# Patient Record
Sex: FEMALE | Race: BLACK OR AFRICAN AMERICAN | NOT HISPANIC OR LATINO | Employment: OTHER | RURAL
[De-identification: names, ages, dates, MRNs, and addresses within clinical notes are randomized per-mention and may not be internally consistent; named-entity substitution may affect disease eponyms.]

---

## 2018-02-22 ENCOUNTER — HISTORICAL (OUTPATIENT)
Dept: ADMINISTRATIVE | Facility: HOSPITAL | Age: 73
End: 2018-02-22

## 2018-02-23 LAB
LAB AP CLINICAL INFORMATION: NORMAL
LAB AP DIAGNOSIS - HISTORICAL: NORMAL
LAB AP GROSS PATHOLOGY - HISTORICAL: NORMAL
LAB AP SPECIMEN SUBMITTED - HISTORICAL: NORMAL

## 2019-09-30 ENCOUNTER — HISTORICAL (OUTPATIENT)
Dept: ADMINISTRATIVE | Facility: HOSPITAL | Age: 74
End: 2019-09-30

## 2019-12-18 ENCOUNTER — HISTORICAL (OUTPATIENT)
Dept: ADMINISTRATIVE | Facility: HOSPITAL | Age: 74
End: 2019-12-18

## 2020-03-11 ENCOUNTER — HISTORICAL (OUTPATIENT)
Dept: ADMINISTRATIVE | Facility: HOSPITAL | Age: 75
End: 2020-03-11

## 2021-04-27 ENCOUNTER — OFFICE VISIT (OUTPATIENT)
Dept: FAMILY MEDICINE | Facility: CLINIC | Age: 76
End: 2021-04-27
Payer: COMMERCIAL

## 2021-04-27 VITALS
HEART RATE: 65 BPM | TEMPERATURE: 98 F | BODY MASS INDEX: 25.55 KG/M2 | WEIGHT: 159 LBS | SYSTOLIC BLOOD PRESSURE: 140 MMHG | HEIGHT: 66 IN | DIASTOLIC BLOOD PRESSURE: 62 MMHG | OXYGEN SATURATION: 96 %

## 2021-04-27 DIAGNOSIS — I10 ESSENTIAL HYPERTENSION: Primary | ICD-10-CM

## 2021-04-27 DIAGNOSIS — F43.22 ADJUSTMENT DISORDER WITH ANXIETY: ICD-10-CM

## 2021-04-27 DIAGNOSIS — K21.9 GASTROESOPHAGEAL REFLUX DISEASE WITHOUT ESOPHAGITIS: ICD-10-CM

## 2021-04-27 PROCEDURE — 99214 OFFICE O/P EST MOD 30 MIN: CPT | Mod: ,,, | Performed by: FAMILY MEDICINE

## 2021-04-27 PROCEDURE — 99214 PR OFFICE/OUTPT VISIT, EST, LEVL IV, 30-39 MIN: ICD-10-PCS | Mod: ,,, | Performed by: FAMILY MEDICINE

## 2021-04-27 RX ORDER — PANTOPRAZOLE SODIUM 40 MG/1
TABLET, DELAYED RELEASE ORAL
COMMUNITY
Start: 2021-02-24 | End: 2021-04-27 | Stop reason: SDUPTHER

## 2021-04-27 RX ORDER — HYDROXYZINE PAMOATE 25 MG/1
CAPSULE ORAL
Qty: 30 CAPSULE | Refills: 2 | Status: SHIPPED | OUTPATIENT
Start: 2021-04-27 | End: 2021-05-30 | Stop reason: ALTCHOICE

## 2021-04-27 RX ORDER — METOPROLOL SUCCINATE 50 MG/1
50 TABLET, EXTENDED RELEASE ORAL
COMMUNITY
Start: 2021-03-08 | End: 2021-10-26

## 2021-04-27 RX ORDER — PANTOPRAZOLE SODIUM 40 MG/1
40 TABLET, DELAYED RELEASE ORAL DAILY
Qty: 90 TABLET | Refills: 0 | Status: SHIPPED | OUTPATIENT
Start: 2021-04-27 | End: 2021-05-30 | Stop reason: ALTCHOICE

## 2021-04-27 RX ORDER — PRAVASTATIN SODIUM 20 MG/1
TABLET ORAL
COMMUNITY
Start: 2021-02-24 | End: 2021-06-08 | Stop reason: SDUPTHER

## 2021-04-27 RX ORDER — SITAGLIPTIN AND METFORMIN HYDROCHLORIDE 1000; 100 MG/1; MG/1
TABLET, FILM COATED, EXTENDED RELEASE ORAL
COMMUNITY
Start: 2021-02-28 | End: 2021-05-17

## 2021-04-27 RX ORDER — ONDANSETRON 4 MG/1
4 TABLET, FILM COATED ORAL
COMMUNITY
End: 2022-06-06 | Stop reason: SDUPTHER

## 2021-04-27 RX ORDER — LISINOPRIL 40 MG/1
40 TABLET ORAL
COMMUNITY
Start: 2021-03-08 | End: 2023-09-20 | Stop reason: SDUPTHER

## 2021-05-10 ENCOUNTER — HOSPITAL ENCOUNTER (OUTPATIENT)
Dept: RADIOLOGY | Facility: HOSPITAL | Age: 76
Discharge: HOME | End: 2021-05-10
Payer: COMMERCIAL

## 2021-05-10 VITALS — BODY MASS INDEX: 25.07 KG/M2 | HEIGHT: 66 IN | WEIGHT: 156 LBS

## 2021-05-10 DIAGNOSIS — Z12.31 VISIT FOR SCREENING MAMMOGRAM: ICD-10-CM

## 2021-05-10 PROCEDURE — 77067 SCR MAMMO BI INCL CAD: CPT | Mod: TC

## 2021-05-10 PROCEDURE — 77067 SCR MAMMO BI INCL CAD: CPT | Mod: 26,,, | Performed by: RADIOLOGY

## 2021-05-10 PROCEDURE — 77067 MAMMO DIGITAL SCREENING BILAT: ICD-10-PCS | Mod: 26,,, | Performed by: RADIOLOGY

## 2021-05-17 ENCOUNTER — TELEPHONE (OUTPATIENT)
Dept: DIABETES SERVICES | Facility: CLINIC | Age: 76
End: 2021-05-17

## 2021-05-30 ENCOUNTER — OFFICE VISIT (OUTPATIENT)
Dept: FAMILY MEDICINE | Facility: CLINIC | Age: 76
End: 2021-05-30
Payer: COMMERCIAL

## 2021-05-30 VITALS
OXYGEN SATURATION: 100 % | BODY MASS INDEX: 25.29 KG/M2 | SYSTOLIC BLOOD PRESSURE: 152 MMHG | HEART RATE: 66 BPM | HEIGHT: 66 IN | WEIGHT: 157.38 LBS | RESPIRATION RATE: 18 BRPM | DIASTOLIC BLOOD PRESSURE: 67 MMHG | TEMPERATURE: 98 F

## 2021-05-30 DIAGNOSIS — R19.7 DIARRHEA, UNSPECIFIED TYPE: Primary | ICD-10-CM

## 2021-05-30 PROBLEM — I50.20 SYSTOLIC HEART FAILURE: Status: ACTIVE | Noted: 2018-10-01

## 2021-05-30 PROBLEM — I20.0 UNSTABLE ANGINA: Status: ACTIVE | Noted: 2020-11-02

## 2021-05-30 PROBLEM — I10 BENIGN ESSENTIAL HYPERTENSION: Status: ACTIVE | Noted: 2018-07-03

## 2021-05-30 PROBLEM — R07.9 CHEST PAIN: Status: ACTIVE | Noted: 2018-10-01

## 2021-05-30 PROBLEM — T83.120A: Status: ACTIVE | Noted: 2019-07-25

## 2021-05-30 PROBLEM — R06.00 DYSPNEA: Status: ACTIVE | Noted: 2020-11-02

## 2021-05-30 PROBLEM — R15.9 FECAL INCONTINENCE: Status: ACTIVE | Noted: 2018-10-03

## 2021-05-30 LAB
ALBUMIN SERPL BCP-MCNC: 3.6 G/DL (ref 3.5–5)
ALBUMIN/GLOB SERPL: 1 {RATIO}
ALP SERPL-CCNC: 53 U/L (ref 55–142)
ALT SERPL W P-5'-P-CCNC: 17 U/L (ref 13–56)
ANION GAP SERPL CALCULATED.3IONS-SCNC: 8 MMOL/L (ref 7–16)
AST SERPL W P-5'-P-CCNC: 13 U/L (ref 15–37)
BASOPHILS # BLD AUTO: 0.02 K/UL (ref 0–0.2)
BASOPHILS NFR BLD AUTO: 0.4 % (ref 0–1)
BILIRUB SERPL-MCNC: 0.2 MG/DL (ref 0–1.2)
BUN SERPL-MCNC: 20 MG/DL (ref 7–18)
BUN/CREAT SERPL: 21 (ref 6–20)
C COLI+JEJ+UPSA DNA STL QL NAA+NON-PROBE: NEGATIVE
C DIFF TOX A+B STL IA-ACNC: POSITIVE
CALCIUM SERPL-MCNC: 9.1 MG/DL (ref 8.5–10.1)
CHLORIDE SERPL-SCNC: 111 MMOL/L (ref 98–107)
CO2 SERPL-SCNC: 29 MMOL/L (ref 21–32)
CREAT SERPL-MCNC: 0.94 MG/DL (ref 0.55–1.02)
DIFFERENTIAL METHOD BLD: ABNORMAL
E COLI SXT1 STL QL IA: NEGATIVE
E COLI SXT2 STL QL IA: NEGATIVE
EOSINOPHIL # BLD AUTO: 0.09 K/UL (ref 0–0.5)
EOSINOPHIL NFR BLD AUTO: 1.6 % (ref 1–4)
ERYTHROCYTE [DISTWIDTH] IN BLOOD BY AUTOMATED COUNT: 13.6 % (ref 11.5–14.5)
GLOBULIN SER-MCNC: 3.5 G/DL (ref 2–4)
GLUCOSE SERPL-MCNC: 90 MG/DL (ref 74–106)
HCT VFR BLD AUTO: 35.2 % (ref 38–47)
HGB BLD-MCNC: 11.1 G/DL (ref 12–16)
IMM GRANULOCYTES # BLD AUTO: 0.01 K/UL (ref 0–0.04)
IMM GRANULOCYTES NFR BLD: 0.2 % (ref 0–0.4)
LYMPHOCYTES # BLD AUTO: 1.6 K/UL (ref 1–4.8)
LYMPHOCYTES NFR BLD AUTO: 29.2 % (ref 27–41)
MCH RBC QN AUTO: 27.4 PG (ref 27–31)
MCHC RBC AUTO-ENTMCNC: 31.5 G/DL (ref 32–36)
MCV RBC AUTO: 86.9 FL (ref 80–96)
MONOCYTES # BLD AUTO: 0.45 K/UL (ref 0–0.8)
MONOCYTES NFR BLD AUTO: 8.2 % (ref 2–6)
MPC BLD CALC-MCNC: 10.7 FL (ref 9.4–12.4)
NEUTROPHILS # BLD AUTO: 3.31 K/UL (ref 1.8–7.7)
NEUTROPHILS NFR BLD AUTO: 60.4 % (ref 53–65)
NOROVIRUS GI+II RNA STL QL NAA+NON-PROBE: NEGATIVE
NRBC # BLD AUTO: 0 X10E3/UL
NRBC, AUTO (.00): 0 %
PLATELET # BLD AUTO: 240 K/UL (ref 150–400)
POTASSIUM SERPL-SCNC: 4.5 MMOL/L (ref 3.5–5.1)
PROT SERPL-MCNC: 7.1 G/DL (ref 6.4–8.2)
RBC # BLD AUTO: 4.05 M/UL (ref 4.2–5.4)
RVA RNA STL QL NAA+NON-PROBE: NEGATIVE
S ENT+BONG DNA STL QL NAA+NON-PROBE: NEGATIVE
SHIGELLA SPECIES NAT: NEGATIVE
SODIUM SERPL-SCNC: 143 MMOL/L (ref 136–145)
V CHOL+PARA+VUL DNA STL QL NAA+NON-PROBE: NEGATIVE
WBC # BLD AUTO: 5.48 K/UL (ref 4.5–11)
Y ENTEROCOL DNA STL QL NAA+NON-PROBE: NEGATIVE

## 2021-05-30 PROCEDURE — 80053 COMPREHENSIVE METABOLIC PANEL: ICD-10-PCS | Mod: QW,,, | Performed by: CLINICAL MEDICAL LABORATORY

## 2021-05-30 PROCEDURE — 99214 OFFICE O/P EST MOD 30 MIN: CPT | Mod: ,,, | Performed by: NURSE PRACTITIONER

## 2021-05-30 PROCEDURE — 87493 C DIFF TOXIN BY PCR: ICD-10-PCS | Mod: 59,,, | Performed by: CLINICAL MEDICAL LABORATORY

## 2021-05-30 PROCEDURE — 80053 COMPREHEN METABOLIC PANEL: CPT | Mod: QW,,, | Performed by: CLINICAL MEDICAL LABORATORY

## 2021-05-30 PROCEDURE — 87506 ENTERIC PATHOGEN PANEL: ICD-10-PCS | Mod: ,,, | Performed by: CLINICAL MEDICAL LABORATORY

## 2021-05-30 PROCEDURE — 99051 MED SERV EVE/WKEND/HOLIDAY: CPT | Mod: ,,, | Performed by: NURSE PRACTITIONER

## 2021-05-30 PROCEDURE — 85025 COMPLETE CBC W/AUTO DIFF WBC: CPT | Mod: QW,,, | Performed by: CLINICAL MEDICAL LABORATORY

## 2021-05-30 PROCEDURE — 87493 C DIFF AMPLIFIED PROBE: CPT | Mod: 59,,, | Performed by: CLINICAL MEDICAL LABORATORY

## 2021-05-30 PROCEDURE — 85025 CBC WITH DIFFERENTIAL: ICD-10-PCS | Mod: QW,,, | Performed by: CLINICAL MEDICAL LABORATORY

## 2021-05-30 PROCEDURE — 99051 PR MEDICAL SERVICES, EVE/WKEND/HOLIDAY: ICD-10-PCS | Mod: ,,, | Performed by: NURSE PRACTITIONER

## 2021-05-30 PROCEDURE — 87506 IADNA-DNA/RNA PROBE TQ 6-11: CPT | Mod: ,,, | Performed by: CLINICAL MEDICAL LABORATORY

## 2021-05-30 PROCEDURE — 99214 PR OFFICE/OUTPT VISIT, EST, LEVL IV, 30-39 MIN: ICD-10-PCS | Mod: ,,, | Performed by: NURSE PRACTITIONER

## 2021-05-30 RX ORDER — METRONIDAZOLE 500 MG/1
500 TABLET ORAL 3 TIMES DAILY
Qty: 30 TABLET | Refills: 0 | Status: SHIPPED | OUTPATIENT
Start: 2021-05-30 | End: 2021-06-01 | Stop reason: ALTCHOICE

## 2021-05-30 RX ORDER — PANTOPRAZOLE SODIUM 40 MG/1
40 TABLET, DELAYED RELEASE ORAL DAILY
COMMUNITY
End: 2021-12-09

## 2021-05-30 RX ORDER — METRONIDAZOLE 500 MG/1
500 TABLET ORAL 3 TIMES DAILY
Qty: 30 TABLET | Refills: 0 | Status: SHIPPED | OUTPATIENT
Start: 2021-05-30 | End: 2021-05-30

## 2021-05-30 RX ORDER — AZITHROMYCIN 500 MG/1
500 TABLET, FILM COATED ORAL DAILY
Qty: 3 TABLET | Refills: 0 | Status: SHIPPED | OUTPATIENT
Start: 2021-05-30 | End: 2021-06-01 | Stop reason: ALTCHOICE

## 2021-06-01 RX ORDER — VANCOMYCIN HYDROCHLORIDE 125 MG/1
125 CAPSULE ORAL 4 TIMES DAILY
Qty: 40 CAPSULE | Refills: 0 | Status: SHIPPED | OUTPATIENT
Start: 2021-06-01 | End: 2021-06-11

## 2021-06-14 ENCOUNTER — OFFICE VISIT (OUTPATIENT)
Dept: FAMILY MEDICINE | Facility: CLINIC | Age: 76
End: 2021-06-14
Payer: COMMERCIAL

## 2021-06-14 VITALS
OXYGEN SATURATION: 99 % | WEIGHT: 156.81 LBS | SYSTOLIC BLOOD PRESSURE: 168 MMHG | RESPIRATION RATE: 16 BRPM | BODY MASS INDEX: 25.2 KG/M2 | TEMPERATURE: 97 F | HEIGHT: 66 IN | DIASTOLIC BLOOD PRESSURE: 81 MMHG | HEART RATE: 60 BPM

## 2021-06-14 DIAGNOSIS — R19.7 DIARRHEA, UNSPECIFIED TYPE: ICD-10-CM

## 2021-06-14 DIAGNOSIS — A04.72 C. DIFFICILE DIARRHEA: Primary | ICD-10-CM

## 2021-06-14 LAB
ALBUMIN SERPL BCP-MCNC: 3.7 G/DL (ref 3.5–5)
ALBUMIN/GLOB SERPL: 1.1 {RATIO}
ALP SERPL-CCNC: 49 U/L (ref 55–142)
ALT SERPL W P-5'-P-CCNC: 22 U/L (ref 13–56)
ANION GAP SERPL CALCULATED.3IONS-SCNC: 10 MMOL/L (ref 7–16)
AST SERPL W P-5'-P-CCNC: 15 U/L (ref 15–37)
BASOPHILS # BLD AUTO: 0.02 K/UL (ref 0–0.2)
BASOPHILS NFR BLD AUTO: 0.4 % (ref 0–1)
BILIRUB SERPL-MCNC: 0.4 MG/DL (ref 0–1.2)
BUN SERPL-MCNC: 14 MG/DL (ref 7–18)
BUN/CREAT SERPL: 18 (ref 6–20)
CALCIUM SERPL-MCNC: 8.8 MG/DL (ref 8.5–10.1)
CHLORIDE SERPL-SCNC: 106 MMOL/L (ref 98–107)
CO2 SERPL-SCNC: 30 MMOL/L (ref 21–32)
CREAT SERPL-MCNC: 0.79 MG/DL (ref 0.55–1.02)
DIFFERENTIAL METHOD BLD: ABNORMAL
EOSINOPHIL # BLD AUTO: 0.08 K/UL (ref 0–0.5)
EOSINOPHIL NFR BLD AUTO: 1.7 % (ref 1–4)
ERYTHROCYTE [DISTWIDTH] IN BLOOD BY AUTOMATED COUNT: 13.9 % (ref 11.5–14.5)
GLOBULIN SER-MCNC: 3.3 G/DL (ref 2–4)
GLUCOSE SERPL-MCNC: 79 MG/DL (ref 74–106)
HCT VFR BLD AUTO: 34.3 % (ref 38–47)
HGB BLD-MCNC: 10.9 G/DL (ref 12–16)
IMM GRANULOCYTES # BLD AUTO: 0.01 K/UL (ref 0–0.04)
IMM GRANULOCYTES NFR BLD: 0.2 % (ref 0–0.4)
LYMPHOCYTES # BLD AUTO: 1.85 K/UL (ref 1–4.8)
LYMPHOCYTES NFR BLD AUTO: 39.9 % (ref 27–41)
MCH RBC QN AUTO: 27.8 PG (ref 27–31)
MCHC RBC AUTO-ENTMCNC: 31.8 G/DL (ref 32–36)
MCV RBC AUTO: 87.5 FL (ref 80–96)
MONOCYTES # BLD AUTO: 0.45 K/UL (ref 0–0.8)
MONOCYTES NFR BLD AUTO: 9.7 % (ref 2–6)
MPC BLD CALC-MCNC: 11 FL (ref 9.4–12.4)
NEUTROPHILS # BLD AUTO: 2.23 K/UL (ref 1.8–7.7)
NEUTROPHILS NFR BLD AUTO: 48.1 % (ref 53–65)
NRBC # BLD AUTO: 0 X10E3/UL
NRBC, AUTO (.00): 0 %
PLATELET # BLD AUTO: 197 K/UL (ref 150–400)
POTASSIUM SERPL-SCNC: 4.6 MMOL/L (ref 3.5–5.1)
PROT SERPL-MCNC: 7 G/DL (ref 6.4–8.2)
RBC # BLD AUTO: 3.92 M/UL (ref 4.2–5.4)
SODIUM SERPL-SCNC: 141 MMOL/L (ref 136–145)
WBC # BLD AUTO: 4.64 K/UL (ref 4.5–11)

## 2021-06-14 PROCEDURE — 99214 PR OFFICE/OUTPT VISIT, EST, LEVL IV, 30-39 MIN: ICD-10-PCS | Mod: ,,, | Performed by: NURSE PRACTITIONER

## 2021-06-14 PROCEDURE — 80053 COMPREHENSIVE METABOLIC PANEL: ICD-10-PCS | Mod: ,,, | Performed by: CLINICAL MEDICAL LABORATORY

## 2021-06-14 PROCEDURE — 99214 OFFICE O/P EST MOD 30 MIN: CPT | Mod: ,,, | Performed by: NURSE PRACTITIONER

## 2021-06-14 PROCEDURE — 80053 COMPREHEN METABOLIC PANEL: CPT | Mod: ,,, | Performed by: CLINICAL MEDICAL LABORATORY

## 2021-06-14 PROCEDURE — 85025 CBC WITH DIFFERENTIAL: ICD-10-PCS | Mod: ,,, | Performed by: CLINICAL MEDICAL LABORATORY

## 2021-06-14 PROCEDURE — 85025 COMPLETE CBC W/AUTO DIFF WBC: CPT | Mod: ,,, | Performed by: CLINICAL MEDICAL LABORATORY

## 2021-06-14 RX ORDER — VANCOMYCIN HYDROCHLORIDE 125 MG/1
125 CAPSULE ORAL 4 TIMES DAILY
Qty: 40 CAPSULE | Refills: 0 | Status: SHIPPED | OUTPATIENT
Start: 2021-06-14 | End: 2021-07-02

## 2021-06-15 RX ORDER — ASPIRIN 81 MG/1
81 TABLET ORAL DAILY
COMMUNITY

## 2021-06-15 RX ORDER — CETIRIZINE HYDROCHLORIDE 10 MG/1
10 TABLET ORAL DAILY
COMMUNITY
End: 2022-01-26

## 2021-06-15 RX ORDER — FLUTICASONE PROPIONATE 50 MCG
1 SPRAY, SUSPENSION (ML) NASAL DAILY
COMMUNITY
End: 2022-01-26

## 2021-06-15 RX ORDER — BLOOD-GLUCOSE CONTROL, NORMAL
EACH MISCELLANEOUS
COMMUNITY
End: 2022-01-04 | Stop reason: SDUPTHER

## 2021-06-16 ENCOUNTER — OFFICE VISIT (OUTPATIENT)
Dept: DIABETES SERVICES | Facility: CLINIC | Age: 76
End: 2021-06-16
Payer: COMMERCIAL

## 2021-06-16 VITALS
HEIGHT: 66 IN | RESPIRATION RATE: 16 BRPM | SYSTOLIC BLOOD PRESSURE: 122 MMHG | DIASTOLIC BLOOD PRESSURE: 58 MMHG | WEIGHT: 158.19 LBS | OXYGEN SATURATION: 96 % | BODY MASS INDEX: 25.42 KG/M2 | HEART RATE: 66 BPM

## 2021-06-16 DIAGNOSIS — I50.20 SYSTOLIC HEART FAILURE, UNSPECIFIED HF CHRONICITY: ICD-10-CM

## 2021-06-16 DIAGNOSIS — E11.9 TYPE 2 DIABETES MELLITUS WITHOUT COMPLICATION, WITHOUT LONG-TERM CURRENT USE OF INSULIN: Primary | ICD-10-CM

## 2021-06-16 DIAGNOSIS — I10 BENIGN ESSENTIAL HYPERTENSION: ICD-10-CM

## 2021-06-16 DIAGNOSIS — R15.9 INCONTINENCE OF FECES, UNSPECIFIED FECAL INCONTINENCE TYPE: ICD-10-CM

## 2021-06-16 LAB
GLUCOSE SERPL-MCNC: 137 MG/DL (ref 70–110)
HBA1C MFR BLD: 6.6 % (ref 4.5–6.6)

## 2021-06-16 PROCEDURE — 99214 PR OFFICE/OUTPT VISIT, EST, LEVL IV, 30-39 MIN: ICD-10-PCS | Mod: S$PBB,,, | Performed by: NURSE PRACTITIONER

## 2021-06-16 PROCEDURE — 82962 GLUCOSE BLOOD TEST: CPT | Mod: PBBFAC | Performed by: NURSE PRACTITIONER

## 2021-06-16 PROCEDURE — 99214 OFFICE O/P EST MOD 30 MIN: CPT | Mod: PBBFAC | Performed by: NURSE PRACTITIONER

## 2021-06-16 PROCEDURE — 83036 HEMOGLOBIN GLYCOSYLATED A1C: CPT | Mod: PBBFAC | Performed by: NURSE PRACTITIONER

## 2021-06-16 PROCEDURE — 99214 OFFICE O/P EST MOD 30 MIN: CPT | Mod: S$PBB,,, | Performed by: NURSE PRACTITIONER

## 2021-06-16 RX ORDER — AMOXICILLIN 500 MG
CAPSULE ORAL DAILY
COMMUNITY

## 2021-06-16 RX ORDER — ACETAMINOPHEN 500 MG
5000 TABLET ORAL DAILY
COMMUNITY
End: 2023-09-20 | Stop reason: SDUPTHER

## 2021-07-02 ENCOUNTER — OFFICE VISIT (OUTPATIENT)
Dept: FAMILY MEDICINE | Facility: CLINIC | Age: 76
End: 2021-07-02
Payer: COMMERCIAL

## 2021-07-02 VITALS
HEIGHT: 66 IN | WEIGHT: 156.38 LBS | OXYGEN SATURATION: 100 % | SYSTOLIC BLOOD PRESSURE: 159 MMHG | BODY MASS INDEX: 25.13 KG/M2 | HEART RATE: 70 BPM | TEMPERATURE: 99 F | RESPIRATION RATE: 16 BRPM | DIASTOLIC BLOOD PRESSURE: 64 MMHG

## 2021-07-02 DIAGNOSIS — A09 DIARRHEA OF INFECTIOUS ORIGIN: ICD-10-CM

## 2021-07-02 DIAGNOSIS — A04.72 C. DIFFICILE COLITIS: Primary | ICD-10-CM

## 2021-07-02 DIAGNOSIS — K62.89 PROCTITIS: ICD-10-CM

## 2021-07-02 LAB
ALBUMIN SERPL BCP-MCNC: 3.8 G/DL (ref 3.5–5)
ALBUMIN/GLOB SERPL: 1.2 {RATIO}
ALP SERPL-CCNC: 49 U/L (ref 55–142)
ALT SERPL W P-5'-P-CCNC: 21 U/L (ref 13–56)
ANION GAP SERPL CALCULATED.3IONS-SCNC: 7 MMOL/L (ref 7–16)
AST SERPL W P-5'-P-CCNC: 14 U/L (ref 15–37)
BASOPHILS # BLD AUTO: 0.02 K/UL (ref 0–0.2)
BASOPHILS NFR BLD AUTO: 0.4 % (ref 0–1)
BILIRUB SERPL-MCNC: 0.4 MG/DL (ref 0–1.2)
BUN SERPL-MCNC: 23 MG/DL (ref 7–18)
BUN/CREAT SERPL: 26 (ref 6–20)
CALCIUM SERPL-MCNC: 9.2 MG/DL (ref 8.5–10.1)
CHLORIDE SERPL-SCNC: 108 MMOL/L (ref 98–107)
CO2 SERPL-SCNC: 33 MMOL/L (ref 21–32)
CREAT SERPL-MCNC: 0.88 MG/DL (ref 0.55–1.02)
DIFFERENTIAL METHOD BLD: ABNORMAL
EOSINOPHIL # BLD AUTO: 0.05 K/UL (ref 0–0.5)
EOSINOPHIL NFR BLD AUTO: 1 % (ref 1–4)
ERYTHROCYTE [DISTWIDTH] IN BLOOD BY AUTOMATED COUNT: 13.9 % (ref 11.5–14.5)
GLOBULIN SER-MCNC: 3.2 G/DL (ref 2–4)
GLUCOSE SERPL-MCNC: 96 MG/DL (ref 74–106)
HCT VFR BLD AUTO: 35.8 % (ref 38–47)
HGB BLD-MCNC: 11.2 G/DL (ref 12–16)
IMM GRANULOCYTES # BLD AUTO: 0.01 K/UL (ref 0–0.04)
IMM GRANULOCYTES NFR BLD: 0.2 % (ref 0–0.4)
LYMPHOCYTES # BLD AUTO: 1.54 K/UL (ref 1–4.8)
LYMPHOCYTES NFR BLD AUTO: 31.5 % (ref 27–41)
MCH RBC QN AUTO: 27.4 PG (ref 27–31)
MCHC RBC AUTO-ENTMCNC: 31.3 G/DL (ref 32–36)
MCV RBC AUTO: 87.5 FL (ref 80–96)
MONOCYTES # BLD AUTO: 0.49 K/UL (ref 0–0.8)
MONOCYTES NFR BLD AUTO: 10 % (ref 2–6)
MPC BLD CALC-MCNC: 12 FL (ref 9.4–12.4)
NEUTROPHILS # BLD AUTO: 2.78 K/UL (ref 1.8–7.7)
NEUTROPHILS NFR BLD AUTO: 56.9 % (ref 53–65)
NRBC # BLD AUTO: 0 X10E3/UL
NRBC, AUTO (.00): 0 %
PLATELET # BLD AUTO: 218 K/UL (ref 150–400)
POTASSIUM SERPL-SCNC: 4.7 MMOL/L (ref 3.5–5.1)
PROT SERPL-MCNC: 7 G/DL (ref 6.4–8.2)
RBC # BLD AUTO: 4.09 M/UL (ref 4.2–5.4)
SODIUM SERPL-SCNC: 143 MMOL/L (ref 136–145)
WBC # BLD AUTO: 4.89 K/UL (ref 4.5–11)

## 2021-07-02 PROCEDURE — 80053 COMPREHENSIVE METABOLIC PANEL: ICD-10-PCS | Mod: QW,,, | Performed by: CLINICAL MEDICAL LABORATORY

## 2021-07-02 PROCEDURE — 85025 COMPLETE CBC W/AUTO DIFF WBC: CPT | Mod: QW,,, | Performed by: CLINICAL MEDICAL LABORATORY

## 2021-07-02 PROCEDURE — 87506 ENTERIC PATHOGEN PANEL: ICD-10-PCS | Mod: GZ,,, | Performed by: CLINICAL MEDICAL LABORATORY

## 2021-07-02 PROCEDURE — 99214 OFFICE O/P EST MOD 30 MIN: CPT | Mod: ,,, | Performed by: NURSE PRACTITIONER

## 2021-07-02 PROCEDURE — 85025 CBC WITH DIFFERENTIAL: ICD-10-PCS | Mod: QW,,, | Performed by: CLINICAL MEDICAL LABORATORY

## 2021-07-02 PROCEDURE — 87493 C DIFF AMPLIFIED PROBE: CPT | Mod: 59,,, | Performed by: CLINICAL MEDICAL LABORATORY

## 2021-07-02 PROCEDURE — 80053 COMPREHEN METABOLIC PANEL: CPT | Mod: QW,,, | Performed by: CLINICAL MEDICAL LABORATORY

## 2021-07-02 PROCEDURE — 87493 C DIFF TOXIN BY PCR: ICD-10-PCS | Mod: 59,,, | Performed by: CLINICAL MEDICAL LABORATORY

## 2021-07-02 PROCEDURE — 87506 IADNA-DNA/RNA PROBE TQ 6-11: CPT | Mod: GZ,,, | Performed by: CLINICAL MEDICAL LABORATORY

## 2021-07-02 PROCEDURE — 99214 PR OFFICE/OUTPT VISIT, EST, LEVL IV, 30-39 MIN: ICD-10-PCS | Mod: ,,, | Performed by: NURSE PRACTITIONER

## 2021-07-02 RX ORDER — VANCOMYCIN HYDROCHLORIDE 125 MG/1
CAPSULE ORAL
Status: CANCELLED | OUTPATIENT
Start: 2021-07-02

## 2021-07-02 RX ORDER — FLUCONAZOLE 150 MG/1
150 TABLET ORAL DAILY
Qty: 5 TABLET | Refills: 0 | Status: SHIPPED | OUTPATIENT
Start: 2021-07-02 | End: 2021-07-07

## 2021-07-02 RX ORDER — VANCOMYCIN HYDROCHLORIDE 125 MG/1
CAPSULE ORAL
Qty: 81 CAPSULE | Refills: 0 | Status: SHIPPED | OUTPATIENT
Start: 2021-07-02 | End: 2021-07-09 | Stop reason: SDUPTHER

## 2021-07-02 RX ORDER — HYDROCORTISONE 25 MG/G
OINTMENT TOPICAL
Qty: 28.35 G | Refills: 2 | Status: SHIPPED | OUTPATIENT
Start: 2021-07-02 | End: 2021-12-16

## 2021-07-03 LAB
C COLI+JEJ+UPSA DNA STL QL NAA+NON-PROBE: NEGATIVE
E COLI SXT1 STL QL IA: NEGATIVE
E COLI SXT2 STL QL IA: NEGATIVE
NOROVIRUS GI+II RNA STL QL NAA+NON-PROBE: NEGATIVE
RVA RNA STL QL NAA+NON-PROBE: NEGATIVE
S ENT+BONG DNA STL QL NAA+NON-PROBE: NEGATIVE
SHIGELLA SPECIES NAT: NEGATIVE
V CHOL+PARA+VUL DNA STL QL NAA+NON-PROBE: NEGATIVE
Y ENTEROCOL DNA STL QL NAA+NON-PROBE: NEGATIVE

## 2021-07-04 LAB — C DIFF TOX A+B STL IA-ACNC: POSITIVE

## 2021-07-09 ENCOUNTER — OFFICE VISIT (OUTPATIENT)
Dept: GASTROENTEROLOGY | Facility: CLINIC | Age: 76
End: 2021-07-09
Payer: COMMERCIAL

## 2021-07-09 ENCOUNTER — TELEPHONE (OUTPATIENT)
Dept: GASTROENTEROLOGY | Facility: CLINIC | Age: 76
End: 2021-07-09

## 2021-07-09 VITALS
HEIGHT: 66 IN | SYSTOLIC BLOOD PRESSURE: 157 MMHG | HEART RATE: 77 BPM | BODY MASS INDEX: 24.75 KG/M2 | WEIGHT: 154 LBS | OXYGEN SATURATION: 97 % | DIASTOLIC BLOOD PRESSURE: 62 MMHG

## 2021-07-09 DIAGNOSIS — A49.8 CLOSTRIDIUM DIFFICILE INFECTION: Primary | ICD-10-CM

## 2021-07-09 DIAGNOSIS — K21.9 GASTROESOPHAGEAL REFLUX DISEASE, UNSPECIFIED WHETHER ESOPHAGITIS PRESENT: ICD-10-CM

## 2021-07-09 DIAGNOSIS — R15.9 INCONTINENCE OF FECES, UNSPECIFIED FECAL INCONTINENCE TYPE: ICD-10-CM

## 2021-07-09 PROCEDURE — 99214 PR OFFICE/OUTPT VISIT, EST, LEVL IV, 30-39 MIN: ICD-10-PCS | Mod: ,,, | Performed by: NURSE PRACTITIONER

## 2021-07-09 PROCEDURE — 99214 OFFICE O/P EST MOD 30 MIN: CPT | Mod: ,,, | Performed by: NURSE PRACTITIONER

## 2021-07-09 RX ORDER — VANCOMYCIN HYDROCHLORIDE 125 MG/1
CAPSULE ORAL
Qty: 81 CAPSULE | Refills: 0 | Status: SHIPPED | OUTPATIENT
Start: 2021-07-09 | End: 2022-01-26 | Stop reason: ALTCHOICE

## 2021-07-26 ENCOUNTER — OFFICE VISIT (OUTPATIENT)
Dept: FAMILY MEDICINE | Facility: CLINIC | Age: 76
End: 2021-07-26
Payer: COMMERCIAL

## 2021-07-26 VITALS
TEMPERATURE: 98 F | OXYGEN SATURATION: 99 % | DIASTOLIC BLOOD PRESSURE: 66 MMHG | WEIGHT: 157 LBS | HEART RATE: 67 BPM | SYSTOLIC BLOOD PRESSURE: 132 MMHG | HEIGHT: 66 IN | BODY MASS INDEX: 25.23 KG/M2

## 2021-07-26 DIAGNOSIS — E11.9 CONTROLLED TYPE 2 DIABETES MELLITUS WITHOUT COMPLICATION, WITHOUT LONG-TERM CURRENT USE OF INSULIN: Primary | ICD-10-CM

## 2021-07-26 DIAGNOSIS — A04.72 C. DIFFICILE COLITIS: ICD-10-CM

## 2021-07-26 DIAGNOSIS — K21.9 GASTROESOPHAGEAL REFLUX DISEASE WITHOUT ESOPHAGITIS: ICD-10-CM

## 2021-07-26 PROCEDURE — 99214 OFFICE O/P EST MOD 30 MIN: CPT | Mod: ,,, | Performed by: FAMILY MEDICINE

## 2021-07-26 PROCEDURE — 99214 PR OFFICE/OUTPT VISIT, EST, LEVL IV, 30-39 MIN: ICD-10-PCS | Mod: ,,, | Performed by: FAMILY MEDICINE

## 2021-07-26 RX ORDER — NYSTATIN 100000 U/G
CREAM TOPICAL 2 TIMES DAILY
Qty: 15 G | Refills: 1 | Status: SHIPPED | OUTPATIENT
Start: 2021-07-26 | End: 2021-12-16

## 2021-09-09 ENCOUNTER — OFFICE VISIT (OUTPATIENT)
Dept: GASTROENTEROLOGY | Facility: CLINIC | Age: 76
End: 2021-09-09
Payer: COMMERCIAL

## 2021-09-09 VITALS
BODY MASS INDEX: 25.55 KG/M2 | HEART RATE: 80 BPM | HEIGHT: 66 IN | SYSTOLIC BLOOD PRESSURE: 154 MMHG | WEIGHT: 159 LBS | OXYGEN SATURATION: 98 % | DIASTOLIC BLOOD PRESSURE: 58 MMHG

## 2021-09-09 DIAGNOSIS — A04.72 CLOSTRIDIUM DIFFICILE DIARRHEA: Primary | ICD-10-CM

## 2021-09-09 DIAGNOSIS — K21.9 GASTROESOPHAGEAL REFLUX DISEASE WITHOUT ESOPHAGITIS: ICD-10-CM

## 2021-09-09 DIAGNOSIS — K31.84 GASTROPARESIS: ICD-10-CM

## 2021-09-09 PROCEDURE — 99213 PR OFFICE/OUTPT VISIT, EST, LEVL III, 20-29 MIN: ICD-10-PCS | Mod: ,,, | Performed by: NURSE PRACTITIONER

## 2021-09-09 PROCEDURE — 99213 OFFICE O/P EST LOW 20 MIN: CPT | Mod: ,,, | Performed by: NURSE PRACTITIONER

## 2021-10-26 ENCOUNTER — OFFICE VISIT (OUTPATIENT)
Dept: FAMILY MEDICINE | Facility: CLINIC | Age: 76
End: 2021-10-26
Payer: COMMERCIAL

## 2021-10-26 VITALS
BODY MASS INDEX: 25.55 KG/M2 | OXYGEN SATURATION: 98 % | SYSTOLIC BLOOD PRESSURE: 173 MMHG | WEIGHT: 159 LBS | RESPIRATION RATE: 16 BRPM | TEMPERATURE: 98 F | HEIGHT: 66 IN | HEART RATE: 63 BPM | DIASTOLIC BLOOD PRESSURE: 75 MMHG

## 2021-10-26 DIAGNOSIS — D64.9 NORMOCYTIC ANEMIA: ICD-10-CM

## 2021-10-26 DIAGNOSIS — H92.01 OTALGIA OF RIGHT EAR: ICD-10-CM

## 2021-10-26 DIAGNOSIS — R09.81 NASAL CONGESTION: ICD-10-CM

## 2021-10-26 DIAGNOSIS — I10 BENIGN ESSENTIAL HYPERTENSION: ICD-10-CM

## 2021-10-26 DIAGNOSIS — E11.9 TYPE 2 DIABETES MELLITUS WITHOUT COMPLICATION, WITHOUT LONG-TERM CURRENT USE OF INSULIN: Primary | ICD-10-CM

## 2021-10-26 DIAGNOSIS — J01.90 ACUTE SINUSITIS, RECURRENCE NOT SPECIFIED, UNSPECIFIED LOCATION: ICD-10-CM

## 2021-10-26 DIAGNOSIS — I50.20 SYSTOLIC HEART FAILURE, UNSPECIFIED HF CHRONICITY: ICD-10-CM

## 2021-10-26 DIAGNOSIS — Z79.899 ENCOUNTER FOR LONG-TERM CURRENT USE OF MEDICATION: ICD-10-CM

## 2021-10-26 LAB
25(OH)D3 SERPL-MCNC: 76.2 NG/ML
CHOLEST SERPL-MCNC: 138 MG/DL (ref 0–200)
CHOLEST/HDLC SERPL: 1.9 {RATIO}
FERRITIN SERPL-MCNC: 87 NG/ML (ref 8–252)
HDLC SERPL-MCNC: 71 MG/DL (ref 40–60)
IRON SATN MFR SERPL: 22 % (ref 14–50)
IRON SERPL-MCNC: 64 ΜG/DL (ref 50–170)
LDLC SERPL CALC-MCNC: 52 MG/DL
LDLC/HDLC SERPL: 0.7 {RATIO}
NONHDLC SERPL-MCNC: 67 MG/DL
TIBC SERPL-MCNC: 292 ΜG/DL (ref 250–450)
TRIGL SERPL-MCNC: 74 MG/DL (ref 35–150)
VLDLC SERPL-MCNC: 15 MG/DL

## 2021-10-26 PROCEDURE — 83036 HEMOGLOBIN GLYCOSYLATED A1C: CPT | Mod: QW,,, | Performed by: CLINICAL MEDICAL LABORATORY

## 2021-10-26 PROCEDURE — 83036 HEMOGLOBIN A1C: ICD-10-PCS | Mod: QW,,, | Performed by: CLINICAL MEDICAL LABORATORY

## 2021-10-26 PROCEDURE — 80061 LIPID PANEL: ICD-10-PCS | Mod: ,,, | Performed by: CLINICAL MEDICAL LABORATORY

## 2021-10-26 PROCEDURE — 99214 OFFICE O/P EST MOD 30 MIN: CPT | Mod: ,,, | Performed by: FAMILY MEDICINE

## 2021-10-26 PROCEDURE — 83550 IRON BINDING TEST: CPT | Mod: ,,, | Performed by: CLINICAL MEDICAL LABORATORY

## 2021-10-26 PROCEDURE — 80061 LIPID PANEL: CPT | Mod: ,,, | Performed by: CLINICAL MEDICAL LABORATORY

## 2021-10-26 PROCEDURE — 99214 PR OFFICE/OUTPT VISIT, EST, LEVL IV, 30-39 MIN: ICD-10-PCS | Mod: ,,, | Performed by: FAMILY MEDICINE

## 2021-10-26 PROCEDURE — 82728 ASSAY OF FERRITIN: CPT | Mod: ,,, | Performed by: CLINICAL MEDICAL LABORATORY

## 2021-10-26 PROCEDURE — 83540 IRON AND TIBC: ICD-10-PCS | Mod: ,,, | Performed by: CLINICAL MEDICAL LABORATORY

## 2021-10-26 PROCEDURE — 83550 IRON AND TIBC: ICD-10-PCS | Mod: ,,, | Performed by: CLINICAL MEDICAL LABORATORY

## 2021-10-26 PROCEDURE — 82306 VITAMIN D 25 HYDROXY: CPT | Mod: ,,, | Performed by: CLINICAL MEDICAL LABORATORY

## 2021-10-26 PROCEDURE — 82306 VITAMIN D: ICD-10-PCS | Mod: ,,, | Performed by: CLINICAL MEDICAL LABORATORY

## 2021-10-26 PROCEDURE — 82728 FERRITIN: ICD-10-PCS | Mod: ,,, | Performed by: CLINICAL MEDICAL LABORATORY

## 2021-10-26 PROCEDURE — 83540 ASSAY OF IRON: CPT | Mod: ,,, | Performed by: CLINICAL MEDICAL LABORATORY

## 2021-10-26 RX ORDER — METOPROLOL SUCCINATE 100 MG/1
100 TABLET, EXTENDED RELEASE ORAL DAILY
COMMUNITY
End: 2023-09-20 | Stop reason: SDUPTHER

## 2021-10-26 RX ORDER — AMOXICILLIN AND CLAVULANATE POTASSIUM 875; 125 MG/1; MG/1
1 TABLET, FILM COATED ORAL EVERY 12 HOURS
Qty: 10 TABLET | Refills: 0 | Status: SHIPPED | OUTPATIENT
Start: 2021-10-26 | End: 2021-10-31

## 2021-10-26 RX ORDER — METOPROLOL SUCCINATE 50 MG/1
50 TABLET, EXTENDED RELEASE ORAL DAILY
COMMUNITY
End: 2021-12-16

## 2021-10-26 RX ORDER — IPRATROPIUM BROMIDE 42 UG/1
2 SPRAY, METERED NASAL 4 TIMES DAILY
Qty: 15 ML | Refills: 0 | Status: SHIPPED | OUTPATIENT
Start: 2021-10-26 | End: 2021-12-16

## 2021-10-27 LAB
EST. AVERAGE GLUCOSE BLD GHB EST-MCNC: 134 MG/DL
HBA1C MFR BLD HPLC: 6.6 % (ref 4.5–6.6)

## 2021-11-01 ENCOUNTER — TELEPHONE (OUTPATIENT)
Dept: GASTROENTEROLOGY | Facility: CLINIC | Age: 76
End: 2021-11-01
Payer: COMMERCIAL

## 2021-11-01 DIAGNOSIS — R19.7 DIARRHEA, UNSPECIFIED TYPE: ICD-10-CM

## 2021-11-01 DIAGNOSIS — R19.7 DIARRHEA, UNSPECIFIED TYPE: Primary | ICD-10-CM

## 2021-11-01 PROCEDURE — 87493 C DIFF AMPLIFIED PROBE: CPT | Mod: ,,, | Performed by: CLINICAL MEDICAL LABORATORY

## 2021-11-01 PROCEDURE — 87493 C DIFF TOXIN BY PCR: ICD-10-PCS | Mod: ,,, | Performed by: CLINICAL MEDICAL LABORATORY

## 2021-11-02 LAB — C DIFF TOX A+B STL IA-ACNC: NEGATIVE

## 2021-12-09 ENCOUNTER — OFFICE VISIT (OUTPATIENT)
Dept: GASTROENTEROLOGY | Facility: CLINIC | Age: 76
End: 2021-12-09
Payer: COMMERCIAL

## 2021-12-09 VITALS
SYSTOLIC BLOOD PRESSURE: 151 MMHG | WEIGHT: 159 LBS | BODY MASS INDEX: 25.55 KG/M2 | HEART RATE: 68 BPM | HEIGHT: 66 IN | OXYGEN SATURATION: 98 % | DIASTOLIC BLOOD PRESSURE: 38 MMHG

## 2021-12-09 DIAGNOSIS — K31.84 GASTROPARESIS: Primary | ICD-10-CM

## 2021-12-09 DIAGNOSIS — K21.9 GASTROESOPHAGEAL REFLUX DISEASE WITHOUT ESOPHAGITIS: ICD-10-CM

## 2021-12-09 PROCEDURE — 99213 PR OFFICE/OUTPT VISIT, EST, LEVL III, 20-29 MIN: ICD-10-PCS | Mod: ,,, | Performed by: NURSE PRACTITIONER

## 2021-12-09 PROCEDURE — 4010F ACE/ARB THERAPY RXD/TAKEN: CPT | Mod: CPTII,,, | Performed by: NURSE PRACTITIONER

## 2021-12-09 PROCEDURE — 99213 OFFICE O/P EST LOW 20 MIN: CPT | Mod: ,,, | Performed by: NURSE PRACTITIONER

## 2021-12-09 PROCEDURE — 4010F PR ACE/ARB THEARPY RXD/TAKEN: ICD-10-PCS | Mod: CPTII,,, | Performed by: NURSE PRACTITIONER

## 2021-12-09 RX ORDER — FAMOTIDINE 20 MG/1
20 TABLET, FILM COATED ORAL 2 TIMES DAILY
Qty: 60 TABLET | Refills: 11 | Status: SHIPPED | OUTPATIENT
Start: 2021-12-09 | End: 2022-03-09 | Stop reason: SDUPTHER

## 2021-12-16 ENCOUNTER — OFFICE VISIT (OUTPATIENT)
Dept: DIABETES SERVICES | Facility: CLINIC | Age: 76
End: 2021-12-16
Payer: COMMERCIAL

## 2021-12-16 VITALS
HEIGHT: 66 IN | HEART RATE: 79 BPM | SYSTOLIC BLOOD PRESSURE: 138 MMHG | DIASTOLIC BLOOD PRESSURE: 64 MMHG | RESPIRATION RATE: 16 BRPM | WEIGHT: 156.19 LBS | BODY MASS INDEX: 25.1 KG/M2 | OXYGEN SATURATION: 99 %

## 2021-12-16 DIAGNOSIS — E11.9 TYPE 2 DIABETES MELLITUS WITHOUT COMPLICATION, WITHOUT LONG-TERM CURRENT USE OF INSULIN: Primary | ICD-10-CM

## 2021-12-16 DIAGNOSIS — E78.5 HYPERLIPIDEMIA, UNSPECIFIED HYPERLIPIDEMIA TYPE: ICD-10-CM

## 2021-12-16 DIAGNOSIS — B37.9 CANDIDIASIS: ICD-10-CM

## 2021-12-16 LAB — GLUCOSE SERPL-MCNC: 165 MG/DL (ref 70–110)

## 2021-12-16 PROCEDURE — 99215 OFFICE O/P EST HI 40 MIN: CPT | Mod: PBBFAC | Performed by: NURSE PRACTITIONER

## 2021-12-16 PROCEDURE — 82962 GLUCOSE BLOOD TEST: CPT | Mod: PBBFAC | Performed by: NURSE PRACTITIONER

## 2021-12-16 PROCEDURE — 4010F ACE/ARB THERAPY RXD/TAKEN: CPT | Mod: CPTII,,, | Performed by: NURSE PRACTITIONER

## 2021-12-16 PROCEDURE — 4010F PR ACE/ARB THEARPY RXD/TAKEN: ICD-10-PCS | Mod: CPTII,,, | Performed by: NURSE PRACTITIONER

## 2021-12-16 PROCEDURE — 99213 PR OFFICE/OUTPT VISIT, EST, LEVL III, 20-29 MIN: ICD-10-PCS | Mod: S$PBB,,, | Performed by: NURSE PRACTITIONER

## 2021-12-16 PROCEDURE — 99213 OFFICE O/P EST LOW 20 MIN: CPT | Mod: S$PBB,,, | Performed by: NURSE PRACTITIONER

## 2021-12-16 RX ORDER — FLUCONAZOLE 100 MG/1
100 TABLET ORAL DAILY
Qty: 2 TABLET | Refills: 0 | Status: SHIPPED | OUTPATIENT
Start: 2021-12-16 | End: 2021-12-16

## 2021-12-16 RX ORDER — FLUCONAZOLE 100 MG/1
100 TABLET ORAL DAILY
Qty: 2 TABLET | Refills: 0 | Status: SHIPPED | OUTPATIENT
Start: 2021-12-16 | End: 2022-01-15

## 2022-01-04 RX ORDER — BLOOD-GLUCOSE CONTROL, NORMAL
100 EACH MISCELLANEOUS DAILY
Qty: 100 EACH | Refills: 3 | Status: SHIPPED | OUTPATIENT
Start: 2022-01-04 | End: 2022-01-13 | Stop reason: SDUPTHER

## 2022-01-04 NOTE — TELEPHONE ENCOUNTER
----- Message from Liz Yu sent at 1/4/2022 10:34 AM CST -----  Regarding: med refill  Patient request refill of one touch verio test strips to check 1 time a day as well as one touch delica lancets to check 1 time a day, sent to [facility] [address]

## 2022-01-13 ENCOUNTER — TELEPHONE (OUTPATIENT)
Dept: DIABETES SERVICES | Facility: CLINIC | Age: 77
End: 2022-01-13
Payer: MEDICARE

## 2022-01-13 DIAGNOSIS — E11.9 TYPE 2 DIABETES MELLITUS WITHOUT COMPLICATION, WITHOUT LONG-TERM CURRENT USE OF INSULIN: Primary | ICD-10-CM

## 2022-01-13 RX ORDER — BLOOD-GLUCOSE CONTROL, NORMAL
100 EACH MISCELLANEOUS DAILY
Qty: 100 EACH | Refills: 3 | Status: SHIPPED | OUTPATIENT
Start: 2022-01-13 | End: 2022-01-26 | Stop reason: ALTCHOICE

## 2022-01-13 NOTE — TELEPHONE ENCOUNTER
----- Message from Liz Yu sent at 1/13/2022  9:43 AM CST -----  Patient was sent in a script for test strips and lancets on the 4th, pharmacy stated they can not fill without dx code, please resend with dx code, thanks

## 2022-01-26 ENCOUNTER — OFFICE VISIT (OUTPATIENT)
Dept: FAMILY MEDICINE | Facility: CLINIC | Age: 77
End: 2022-01-26
Payer: COMMERCIAL

## 2022-01-26 VITALS
DIASTOLIC BLOOD PRESSURE: 67 MMHG | WEIGHT: 159.38 LBS | TEMPERATURE: 98 F | HEART RATE: 74 BPM | BODY MASS INDEX: 25.61 KG/M2 | RESPIRATION RATE: 20 BRPM | OXYGEN SATURATION: 98 % | HEIGHT: 66 IN | SYSTOLIC BLOOD PRESSURE: 137 MMHG

## 2022-01-26 DIAGNOSIS — E11.9 TYPE 2 DIABETES MELLITUS WITHOUT COMPLICATION, WITHOUT LONG-TERM CURRENT USE OF INSULIN: Primary | ICD-10-CM

## 2022-01-26 DIAGNOSIS — E78.5 HYPERLIPIDEMIA, UNSPECIFIED HYPERLIPIDEMIA TYPE: ICD-10-CM

## 2022-01-26 DIAGNOSIS — Z23 ENCOUNTER FOR VACCINATION: ICD-10-CM

## 2022-01-26 LAB
CREAT UR-MCNC: 160 MG/DL (ref 28–219)
EST. AVERAGE GLUCOSE BLD GHB EST-MCNC: 127 MG/DL
HBA1C MFR BLD HPLC: 6.4 % (ref 4.5–6.6)
MICROALBUMIN UR-MCNC: 9.3 MG/DL (ref 0–2.8)
MICROALBUMIN/CREAT RATIO PNL UR: 58.1 MG/G (ref 0–30)

## 2022-01-26 PROCEDURE — 83036 HEMOGLOBIN GLYCOSYLATED A1C: CPT | Mod: ,,, | Performed by: CLINICAL MEDICAL LABORATORY

## 2022-01-26 PROCEDURE — 90662 IIV NO PRSV INCREASED AG IM: CPT | Mod: ,,, | Performed by: FAMILY MEDICINE

## 2022-01-26 PROCEDURE — 90471 FLU VACCINE - QUADRIVALENT - HIGH DOSE (65+) PRESERVATIVE FREE IM: ICD-10-PCS | Mod: ,,, | Performed by: FAMILY MEDICINE

## 2022-01-26 PROCEDURE — 82570 MICROALBUMIN / CREATININE RATIO URINE: ICD-10-PCS | Mod: ,,, | Performed by: CLINICAL MEDICAL LABORATORY

## 2022-01-26 PROCEDURE — 90662 FLU VACCINE - QUADRIVALENT - HIGH DOSE (65+) PRESERVATIVE FREE IM: ICD-10-PCS | Mod: ,,, | Performed by: FAMILY MEDICINE

## 2022-01-26 PROCEDURE — 83036 HEMOGLOBIN A1C: ICD-10-PCS | Mod: ,,, | Performed by: CLINICAL MEDICAL LABORATORY

## 2022-01-26 PROCEDURE — 82043 MICROALBUMIN / CREATININE RATIO URINE: ICD-10-PCS | Mod: ,,, | Performed by: CLINICAL MEDICAL LABORATORY

## 2022-01-26 PROCEDURE — 99214 PR OFFICE/OUTPT VISIT, EST, LEVL IV, 30-39 MIN: ICD-10-PCS | Mod: 25,,, | Performed by: FAMILY MEDICINE

## 2022-01-26 PROCEDURE — 99214 OFFICE O/P EST MOD 30 MIN: CPT | Mod: 25,,, | Performed by: FAMILY MEDICINE

## 2022-01-26 PROCEDURE — 82570 ASSAY OF URINE CREATININE: CPT | Mod: ,,, | Performed by: CLINICAL MEDICAL LABORATORY

## 2022-01-26 PROCEDURE — 82043 UR ALBUMIN QUANTITATIVE: CPT | Mod: ,,, | Performed by: CLINICAL MEDICAL LABORATORY

## 2022-01-26 PROCEDURE — 90471 IMMUNIZATION ADMIN: CPT | Mod: ,,, | Performed by: FAMILY MEDICINE

## 2022-01-26 RX ORDER — BLOOD-GLUCOSE METER
1 EACH MISCELLANEOUS DAILY
COMMUNITY
Start: 2022-01-19 | End: 2023-09-25 | Stop reason: SDUPTHER

## 2022-01-26 RX ORDER — LANCETS
1 EACH MISCELLANEOUS DAILY
COMMUNITY
Start: 2022-01-19 | End: 2022-09-21 | Stop reason: SDUPTHER

## 2022-01-26 RX ORDER — PRAVASTATIN SODIUM 20 MG/1
20 TABLET ORAL NIGHTLY
Qty: 90 TABLET | Refills: 1 | Status: SHIPPED | OUTPATIENT
Start: 2022-01-26 | End: 2022-06-06 | Stop reason: SDUPTHER

## 2022-01-26 NOTE — PROGRESS NOTES
[facility]  Chief Complaint      Chief Complaint   Patient presents with    Follow-up    Diabetes    Flu Vaccine       History of Present Illness      Fatmata Tolbert is a 76 y.o. female with chronic conditions of HTN, ANNA, HFrEF, Normocytic Anemia, GERD, DM2 who presents today for follow-up/med refills. Patient doing well since last visit, with improvement in sinus congestion; unable to tolerate prescribed abx due to diarrhea. Patient scheduled for eye appt next month. She recently was seen by NÉSTOR Hooks, who is assisting in DM mgmt. Chronic conditions otherwise stable on current med regimen. No other acute sx, or events reported.     Past Medical History:  Past Medical History:   Diagnosis Date    Anxiety     Cardiomyopathy     Diabetes mellitus, type 2     Diabetes, polyneuropathy     DJD (degenerative joint disease), multiple sites     GERD (gastroesophageal reflux disease)     HTN (hypertension)     Hyperlipidemia     ANNA (obstructive sleep apnea)     Pseudophakia 02/09/2021       Past Surgical History:   has a past surgical history that includes Breast biopsy and Hysterectomy.    Social History:  Social History     Tobacco Use    Smoking status: Never Smoker    Smokeless tobacco: Never Used   Substance Use Topics    Alcohol use: Never    Drug use: Never       I personally reviewed all past medical, surgical, and social.     Review of Systems   Constitutional: Negative for chills, fatigue and fever.   HENT: Negative for ear pain.    Eyes: Negative for pain and visual disturbance.   Respiratory: Negative for chest tightness and shortness of breath.    Cardiovascular: Negative for chest pain and leg swelling.   Gastrointestinal: Negative for abdominal pain, nausea and vomiting.   Genitourinary: Negative for difficulty urinating.   Musculoskeletal: Negative for gait problem and myalgias.   Skin: Negative for rash.   Neurological: Negative for dizziness and light-headedness.    Hematological: Does not bruise/bleed easily.   Psychiatric/Behavioral: Negative for sleep disturbance.        Medications:  Outpatient Encounter Medications as of 1/26/2022   Medication Sig Dispense Refill    ACCU-CHEK GUIDE GLUCOSE METER Misc 1 strip by Misc.(Non-Drug; Combo Route) route once daily.      ACCU-CHEK SOFTCLIX LANCETS Misc 1 lancet by Misc.(Non-Drug; Combo Route) route once daily.      aspirin (ECOTRIN) 81 MG EC tablet Take 81 mg by mouth once daily.      cholecalciferol, vitamin D3, 125 mcg (5,000 unit) Tab Take 5,000 Units by mouth once daily.      famotidine (PEPCID) 20 MG tablet Take 1 tablet (20 mg total) by mouth 2 (two) times daily. 60 tablet 11    lisinopriL (PRINIVIL,ZESTRIL) 40 MG tablet Take 40 mg by mouth.      SORAYA BIOTIN ORAL Take 3,000 mcg by mouth 2 (two) times a day.      metoprolol succinate (TOPROL-XL) 100 MG 24 hr tablet Take 100 mg by mouth once daily.      omega-3 fatty acids/fish oil (FISH OIL-OMEGA-3 FATTY ACIDS) 300-1,000 mg capsule Take by mouth once daily.      ondansetron (ZOFRAN) 4 MG tablet Take 4 mg by mouth.      SITagliptan-metformin (JANUMET)  mg per tablet Take 1 tablet by mouth 2 (two) times daily with meals. 180 tablet 3    [DISCONTINUED] blood-glucose meter (ONETOUCH ULTRA2 METER MISC) by Misc.(Non-Drug; Combo Route) route. To check blood sugar daily      [DISCONTINUED] lancets 30 gauge Misc 100 lancets by Misc.(Non-Drug; Combo Route) route once daily. To check blood sugar daily 100 each 3    [DISCONTINUED] pravastatin (PRAVACHOL) 20 MG tablet TAKE 1 TABLET AT BEDTIME   FOR CHOLESTEROL 90 tablet 0    pravastatin (PRAVACHOL) 20 MG tablet Take 1 tablet (20 mg total) by mouth nightly. 90 tablet 1    vancomycin (VANCOCIN) 125 MG capsule Take 1 po qid for 14 days. Then, 1 po bid for 7 days. Then, 1 po daily for 7 days. Then, 1 po every other day for 1 week. (Patient not taking: Reported on 1/26/2022) 81 capsule 0    [DISCONTINUED] blood sugar  "diagnostic (ONETOUCH ULTRA BLUE TEST STRIP MISC) by Misc.(Non-Drug; Combo Route) route. To check blood sugar daily      [DISCONTINUED] blood sugar diagnostic Strp 1 each by Misc.(Non-Drug; Combo Route) route once daily. (Patient not taking: Reported on 1/26/2022) 100 strip 3    [DISCONTINUED] cetirizine (ZYRTEC) 10 MG tablet Take 10 mg by mouth once daily.      [DISCONTINUED] ferrous fumarate 325 mg (106 mg iron) Tab Take 1 tablet by mouth once daily.      [DISCONTINUED] fluticasone propionate (FLONASE) 50 mcg/actuation nasal spray 1 spray by Each Nostril route once daily.      [DISCONTINUED] Lactobacillus rhamnosus GG (CULTURELLE) 10 billion cell capsule Take 1 capsule by mouth once daily.       No facility-administered encounter medications on file as of 1/26/2022.       Allergies:  Review of patient's allergies indicates:   Allergen Reactions    Augmentin [amoxicillin-pot clavulanate] Diarrhea    Clarithromycin Diarrhea       Health Maintenance:  Immunization History   Administered Date(s) Administered    COVID-19, MRNA, LN-S, PF (MODERNA FULL 0.5 ML DOSE) 02/12/2021, 03/16/2021, 11/05/2021    Influenza - Quadrivalent - High Dose - PF (65 years and older) 01/26/2022      Health Maintenance   Topic Date Due    Hepatitis C Screening  Never done    Foot Exam  Never done    TETANUS VACCINE  Never done    DEXA SCAN  Never done    Eye Exam  02/09/2022    Hemoglobin A1c  04/26/2022    Lipid Panel  10/26/2022        Physical Exam      Vital Signs  Temp: 97.5 °F (36.4 °C)  Temp src: Oral  Pulse: 74  Resp: 20  SpO2: 98 %  BP: 137/67  BP Location: Left arm  Patient Position: Sitting  Height and Weight  Height: 5' 6" (167.6 cm)  Weight: 72.3 kg (159 lb 6.4 oz)  BSA (Calculated - sq m): 1.83 sq meters  BMI (Calculated): 25.7  Weight in (lb) to have BMI = 25: 154.6]    Physical Exam  Constitutional:       Appearance: Normal appearance.   HENT:      Head: Normocephalic and atraumatic.      Nose: Nose normal.     "  Mouth/Throat:      Mouth: Mucous membranes are moist.      Pharynx: Oropharynx is clear.   Eyes:      Extraocular Movements: Extraocular movements intact.      Conjunctiva/sclera: Conjunctivae normal.      Pupils: Pupils are equal, round, and reactive to light.   Cardiovascular:      Rate and Rhythm: Normal rate and regular rhythm.      Pulses: Normal pulses.           Radial pulses are 2+ on the right side and 2+ on the left side.      Heart sounds: Normal heart sounds.   Pulmonary:      Effort: Pulmonary effort is normal.      Breath sounds: Normal breath sounds.   Abdominal:      Palpations: Abdomen is soft.      Tenderness: There is no abdominal tenderness.   Musculoskeletal:         General: Normal range of motion.      Right lower leg: No edema.      Left lower leg: No edema.   Skin:     General: Skin is warm and dry.      Findings: No rash.   Neurological:      General: No focal deficit present.      Mental Status: She is alert. Mental status is at baseline.   Psychiatric:         Mood and Affect: Mood normal.          Laboratory:  CBC:  Recent Labs   Lab 05/30/21  1041 05/30/21  1041 06/14/21  1128 06/14/21 1128 07/02/21  1138   WBC 5.48   < > 4.64   < > 4.89   RBC 4.05 L   < > 3.92 L   < > 4.09 L   Hemoglobin 11.1 L   < > 10.9 L   < > 11.2 L   Hematocrit 35.2 L   < > 34.3 L   < > 35.8 L   Platelet Count 240   < > 197   < > 218   MCV 86.9   < > 87.5   < > 87.5   MCH 27.4   < > 27.8   < > 27.4   MCHC 31.5 L  --  31.8 L  --  31.3 L    < > = values in this interval not displayed.     CMP:  Recent Labs   Lab 05/30/21  1041 05/30/21  1041 06/14/21  1128 06/14/21  1128 07/02/21  1138   Glucose 90   < > 79   < > 96   Calcium 9.1   < > 8.8   < > 9.2   Albumin 3.6   < > 3.7   < > 3.8   Total Protein 7.1   < > 7.0   < > 7.0   Sodium 143   < > 141   < > 143   Potassium 4.5   < > 4.6   < > 4.7   CO2 29   < > 30   < > 33 H   Chloride 111 H   < > 106   < > 108 H   BUN 20 H   < > 14   < > 23 H   Alk Phos 53 L   < > 49 L    < > 49 L   ALT 17   < > 22   < > 21   AST 13 L   < > 15   < > 14 L   Bilirubin, Total 0.2  --  0.4  --  0.4    < > = values in this interval not displayed.     LIPIDS:  Recent Labs   Lab 10/26/21  0920   HDL Cholesterol 71 H   Cholesterol 138   Triglycerides 74   LDL Calculated 52   Cholesterol/HDL Ratio (Risk Factor) 1.9   Non-HDL 67     TSH:      A1C:  Recent Labs   Lab 06/16/21  1333 10/26/21  0920   Hemoglobin A1C 6.6 6.6       Assessment/Plan     Fatmata Tolbert is a 76 y.o.female with:     1. Type 2 diabetes mellitus without complication, without long-term current use of insulin  - Hemoglobin A1C; Future  - Microalbumin/Creatinine Ratio, Urine; Future    2. Hyperlipidemia, unspecified hyperlipidemia type  - pravastatin (PRAVACHOL) 20 MG tablet; Take 1 tablet (20 mg total) by mouth nightly.  Dispense: 90 tablet; Refill: 1    3. Encounter for vaccination  - Influenza - Quadrivalent - High Dose (65+) (PF) (IM)       Total time spent face-to-face and non-face-to-face coordinating care for this encounter was: 35 min    Chronic conditions status updated as per HPI.  Other than changes above, cont current medications and maintain follow up with specialists.  Return to clinic in 3 months.    Yrn Kimball,    [facility]

## 2022-01-31 PROBLEM — J01.90 ACUTE SINUSITIS: Status: RESOLVED | Noted: 2021-10-26 | Resolved: 2022-01-31

## 2022-03-09 ENCOUNTER — OFFICE VISIT (OUTPATIENT)
Dept: GASTROENTEROLOGY | Facility: CLINIC | Age: 77
End: 2022-03-09
Payer: COMMERCIAL

## 2022-03-09 VITALS
BODY MASS INDEX: 25.71 KG/M2 | HEART RATE: 72 BPM | OXYGEN SATURATION: 98 % | HEIGHT: 66 IN | WEIGHT: 160 LBS | SYSTOLIC BLOOD PRESSURE: 137 MMHG | DIASTOLIC BLOOD PRESSURE: 51 MMHG

## 2022-03-09 DIAGNOSIS — K31.84 GASTROPARESIS: ICD-10-CM

## 2022-03-09 DIAGNOSIS — K21.9 GASTROESOPHAGEAL REFLUX DISEASE WITHOUT ESOPHAGITIS: ICD-10-CM

## 2022-03-09 PROCEDURE — 3288F FALL RISK ASSESSMENT DOCD: CPT | Mod: CPTII,,, | Performed by: NURSE PRACTITIONER

## 2022-03-09 PROCEDURE — 3288F PR FALLS RISK ASSESSMENT DOCUMENTED: ICD-10-PCS | Mod: CPTII,,, | Performed by: NURSE PRACTITIONER

## 2022-03-09 PROCEDURE — 3044F PR MOST RECENT HEMOGLOBIN A1C LEVEL <7.0%: ICD-10-PCS | Mod: CPTII,,, | Performed by: NURSE PRACTITIONER

## 2022-03-09 PROCEDURE — 1160F RVW MEDS BY RX/DR IN RCRD: CPT | Mod: CPTII,,, | Performed by: NURSE PRACTITIONER

## 2022-03-09 PROCEDURE — 3078F PR MOST RECENT DIASTOLIC BLOOD PRESSURE < 80 MM HG: ICD-10-PCS | Mod: CPTII,,, | Performed by: NURSE PRACTITIONER

## 2022-03-09 PROCEDURE — 1101F PR PT FALLS ASSESS DOC 0-1 FALLS W/OUT INJ PAST YR: ICD-10-PCS | Mod: CPTII,,, | Performed by: NURSE PRACTITIONER

## 2022-03-09 PROCEDURE — 1160F PR REVIEW ALL MEDS BY PRESCRIBER/CLIN PHARMACIST DOCUMENTED: ICD-10-PCS | Mod: CPTII,,, | Performed by: NURSE PRACTITIONER

## 2022-03-09 PROCEDURE — 1101F PT FALLS ASSESS-DOCD LE1/YR: CPT | Mod: CPTII,,, | Performed by: NURSE PRACTITIONER

## 2022-03-09 PROCEDURE — 3075F PR MOST RECENT SYSTOLIC BLOOD PRESS GE 130-139MM HG: ICD-10-PCS | Mod: CPTII,,, | Performed by: NURSE PRACTITIONER

## 2022-03-09 PROCEDURE — 3044F HG A1C LEVEL LT 7.0%: CPT | Mod: CPTII,,, | Performed by: NURSE PRACTITIONER

## 2022-03-09 PROCEDURE — 3075F SYST BP GE 130 - 139MM HG: CPT | Mod: CPTII,,, | Performed by: NURSE PRACTITIONER

## 2022-03-09 PROCEDURE — 3078F DIAST BP <80 MM HG: CPT | Mod: CPTII,,, | Performed by: NURSE PRACTITIONER

## 2022-03-09 PROCEDURE — 1159F MED LIST DOCD IN RCRD: CPT | Mod: CPTII,,, | Performed by: NURSE PRACTITIONER

## 2022-03-09 PROCEDURE — 1159F PR MEDICATION LIST DOCUMENTED IN MEDICAL RECORD: ICD-10-PCS | Mod: CPTII,,, | Performed by: NURSE PRACTITIONER

## 2022-03-09 PROCEDURE — 99213 OFFICE O/P EST LOW 20 MIN: CPT | Mod: ,,, | Performed by: NURSE PRACTITIONER

## 2022-03-09 PROCEDURE — 99213 PR OFFICE/OUTPT VISIT, EST, LEVL III, 20-29 MIN: ICD-10-PCS | Mod: ,,, | Performed by: NURSE PRACTITIONER

## 2022-03-09 RX ORDER — FAMOTIDINE 20 MG/1
20 TABLET, FILM COATED ORAL 2 TIMES DAILY
Qty: 180 TABLET | Refills: 3 | Status: SHIPPED | OUTPATIENT
Start: 2022-03-09 | End: 2023-04-05 | Stop reason: SDUPTHER

## 2022-03-09 NOTE — PROGRESS NOTES
Fatmata Tolbert is a 76 y.o. female here for Follow-up        PCP: Pablo Draper  Referring Provider: No referring provider defined for this encounter.     HPI:  Presents for follow up GERD and gastroparesis. States that nausea has improved. She is following a low residue small frequent meal diet. Reports a recent cardiac ablation in [place]. States she has felt much better since the ablation. Does have reflux symptoms. Takes Pepcid BID. Denies dysphagia. No hematochezia or melena. Last EGD 9/30/19, mild gastritis. Last colonoscopy 12/18/19, tubular adenoma x 2. Does have a history of cdiff infection.        ROS:  Review of Systems   Constitutional: Negative for appetite change, fatigue, fever and unexpected weight change.   HENT: Negative for trouble swallowing.    Respiratory: Negative for shortness of breath and wheezing.    Cardiovascular: Negative for chest pain and palpitations.   Gastrointestinal: Positive for nausea (improved) and reflux. Negative for abdominal pain, anal bleeding, blood in stool, change in bowel habit, constipation, diarrhea, vomiting and change in bowel habit.   Genitourinary: Negative for dysuria, frequency and urgency.   Musculoskeletal: Negative for back pain, gait problem and joint swelling.   Integumentary:  Negative for pallor.   Neurological: Positive for light-headedness. Negative for dizziness and weakness. Numbness: occasional.   Hematological: Does not bruise/bleed easily.   Psychiatric/Behavioral: The patient is not nervous/anxious.           PMHX:  has a past medical history of Anxiety, Cardiomyopathy, Diabetes mellitus, type 2, Diabetes, polyneuropathy, DJD (degenerative joint disease), multiple sites, GERD (gastroesophageal reflux disease), HTN (hypertension), Hyperlipidemia, ANNA (obstructive sleep apnea), and Pseudophakia (02/09/2021).    PSHX:  has a past surgical history that includes Breast biopsy and Hysterectomy.    PFHX: family history includes Breast cancer in  her maternal grandmother and sister; Diabetes in her mother; Heart disease in her father and sister; Hypertension in her father and sister; Kidney disease in her brother; Prostate cancer in her father; Stroke in her brother, brother, mother, and sister.    PSlHX:  reports that she has never smoked. She has never used smokeless tobacco. She reports that she does not drink alcohol and does not use drugs.        Review of patient's allergies indicates:   Allergen Reactions    Augmentin [amoxicillin-pot clavulanate] Diarrhea    Clarithromycin Diarrhea       Medication List with Changes/Refills   Current Medications    ACCU-CHEK GUIDE GLUCOSE METER MISC    1 strip by Misc.(Non-Drug; Combo Route) route once daily.    ACCU-CHEK SOFTCLIX LANCETS MISC    1 lancet by Misc.(Non-Drug; Combo Route) route once daily.    ASPIRIN (ECOTRIN) 81 MG EC TABLET    Take 81 mg by mouth once daily.    BLOOD SUGAR DIAGNOSTIC STRP    1 each by Misc.(Non-Drug; Combo Route) route once daily.    CHOLECALCIFEROL, VITAMIN D3, 125 MCG (5,000 UNIT) TAB    Take 5,000 Units by mouth once daily.    LISINOPRIL (PRINIVIL,ZESTRIL) 40 MG TABLET    Take 40 mg by mouth.    SORAYA BIOTIN ORAL    Take 3,000 mcg by mouth 2 (two) times a day.    METOPROLOL SUCCINATE (TOPROL-XL) 100 MG 24 HR TABLET    Take 100 mg by mouth once daily.    OMEGA-3 FATTY ACIDS/FISH OIL (FISH OIL-OMEGA-3 FATTY ACIDS) 300-1,000 MG CAPSULE    Take by mouth once daily.    ONDANSETRON (ZOFRAN) 4 MG TABLET    Take 4 mg by mouth.    PRAVASTATIN (PRAVACHOL) 20 MG TABLET    Take 1 tablet (20 mg total) by mouth nightly.    SITAGLIPTAN-METFORMIN (JANUMET)  MG PER TABLET    Take 1 tablet by mouth 2 (two) times daily with meals.   Changed and/or Refilled Medications    Modified Medication Previous Medication    FAMOTIDINE (PEPCID) 20 MG TABLET famotidine (PEPCID) 20 MG tablet       Take 1 tablet (20 mg total) by mouth 2 (two) times daily.    Take 1 tablet (20 mg total) by mouth 2 (two)  "times daily.        Objective Findings:  Vital Signs:  BP (!) 137/51   Pulse 72   Ht 5' 6" (1.676 m)   Wt 72.6 kg (160 lb)   SpO2 98%   BMI 25.82 kg/m²  Body mass index is 25.82 kg/m².    Physical Exam:  Physical Exam  Vitals and nursing note reviewed.   Constitutional:       General: She is not in acute distress.     Appearance: Normal appearance.   HENT:      Mouth/Throat:      Mouth: Mucous membranes are moist.   Cardiovascular:      Rate and Rhythm: Normal rate and regular rhythm.      Heart sounds: No murmur heard.  Pulmonary:      Effort: Pulmonary effort is normal.      Breath sounds: No wheezing, rhonchi or rales.   Abdominal:      General: Bowel sounds are normal. There is no distension.      Palpations: Abdomen is soft. There is no mass.      Tenderness: There is no abdominal tenderness. There is no guarding or rebound.      Hernia: No hernia is present.   Musculoskeletal:      Right lower leg: No edema.      Left lower leg: No edema.   Skin:     General: Skin is warm and dry.      Coloration: Skin is not jaundiced or pale.      Findings: No bruising or rash.   Neurological:      Mental Status: She is alert and oriented to person, place, and time.   Psychiatric:         Mood and Affect: Mood normal.          Labs:  Lab Results   Component Value Date    WBC 5.31 02/21/2022    HGB 11.5 (L) 02/21/2022    HCT 35.4 (L) 02/21/2022    MCV 88.5 02/21/2022    RDW 13.5 02/21/2022     02/21/2022    LYMPH 40.9 02/21/2022    LYMPH 2.17 02/21/2022    MONO 6.2 (H) 02/21/2022    EOS 0.09 02/21/2022    BASO 0.02 02/21/2022     Lab Results   Component Value Date     02/21/2022    K 4.4 02/21/2022     02/21/2022    CO2 30 02/21/2022     (H) 02/21/2022    BUN 22 (H) 02/21/2022    CREATININE 0.97 02/21/2022    CALCIUM 9.5 02/21/2022    PROT 7.0 07/02/2021    ALBUMIN 3.8 07/02/2021    BILITOT 0.4 07/02/2021    ALKPHOS 49 (L) 07/02/2021    AST 14 (L) 07/02/2021    ALT 21 07/02/2021         Imaging: " No results found.      Assessment:  Fatmata Tolbert is a 76 y.o. female here with:  1. Gastroparesis    2. Gastroesophageal reflux disease without esophagitis          Recommendations:  1. Continue small frequent meals. No raw fruits or vegetables. Small amounts of protein at one time. Do not lay down within 3 hours of eating  2. Continue Pepcid 20 mg bid. Avoid spicy greasy foods  3. Inform providers of previous CDIFF infection when there is a need for antibiotics    Follow up in about 6 months (around 9/9/2022).      Order summary:  Orders Placed This Encounter    famotidine (PEPCID) 20 MG tablet       Thank you for allowing me to participate in the care of Fatmata Tolbert.      NAVNEET Solares

## 2022-03-21 ENCOUNTER — OFFICE VISIT (OUTPATIENT)
Dept: DIABETES SERVICES | Facility: CLINIC | Age: 77
End: 2022-03-21
Payer: COMMERCIAL

## 2022-03-21 VITALS
RESPIRATION RATE: 16 BRPM | HEART RATE: 74 BPM | HEIGHT: 66 IN | OXYGEN SATURATION: 99 % | WEIGHT: 159.19 LBS | SYSTOLIC BLOOD PRESSURE: 110 MMHG | DIASTOLIC BLOOD PRESSURE: 64 MMHG | BODY MASS INDEX: 25.58 KG/M2

## 2022-03-21 DIAGNOSIS — K21.9 GASTROESOPHAGEAL REFLUX DISEASE WITHOUT ESOPHAGITIS: ICD-10-CM

## 2022-03-21 DIAGNOSIS — E11.9 TYPE 2 DIABETES MELLITUS WITHOUT COMPLICATION, WITHOUT LONG-TERM CURRENT USE OF INSULIN: Primary | ICD-10-CM

## 2022-03-21 DIAGNOSIS — G47.33 OSA (OBSTRUCTIVE SLEEP APNEA): ICD-10-CM

## 2022-03-21 DIAGNOSIS — E78.5 HYPERLIPIDEMIA, UNSPECIFIED HYPERLIPIDEMIA TYPE: ICD-10-CM

## 2022-03-21 DIAGNOSIS — I10 PRIMARY HYPERTENSION: ICD-10-CM

## 2022-03-21 LAB — GLUCOSE SERPL-MCNC: 189 MG/DL (ref 70–110)

## 2022-03-21 PROCEDURE — 3044F PR MOST RECENT HEMOGLOBIN A1C LEVEL <7.0%: ICD-10-PCS | Mod: CPTII,,, | Performed by: NURSE PRACTITIONER

## 2022-03-21 PROCEDURE — 3074F SYST BP LT 130 MM HG: CPT | Mod: CPTII,,, | Performed by: NURSE PRACTITIONER

## 2022-03-21 PROCEDURE — 99213 PR OFFICE/OUTPT VISIT, EST, LEVL III, 20-29 MIN: ICD-10-PCS | Mod: S$PBB,,, | Performed by: NURSE PRACTITIONER

## 2022-03-21 PROCEDURE — 82962 GLUCOSE BLOOD TEST: CPT | Mod: PBBFAC | Performed by: NURSE PRACTITIONER

## 2022-03-21 PROCEDURE — 3078F DIAST BP <80 MM HG: CPT | Mod: CPTII,,, | Performed by: NURSE PRACTITIONER

## 2022-03-21 PROCEDURE — 3288F FALL RISK ASSESSMENT DOCD: CPT | Mod: CPTII,,, | Performed by: NURSE PRACTITIONER

## 2022-03-21 PROCEDURE — 1160F PR REVIEW ALL MEDS BY PRESCRIBER/CLIN PHARMACIST DOCUMENTED: ICD-10-PCS | Mod: CPTII,,, | Performed by: NURSE PRACTITIONER

## 2022-03-21 PROCEDURE — 1160F RVW MEDS BY RX/DR IN RCRD: CPT | Mod: CPTII,,, | Performed by: NURSE PRACTITIONER

## 2022-03-21 PROCEDURE — 1159F MED LIST DOCD IN RCRD: CPT | Mod: CPTII,,, | Performed by: NURSE PRACTITIONER

## 2022-03-21 PROCEDURE — 3074F PR MOST RECENT SYSTOLIC BLOOD PRESSURE < 130 MM HG: ICD-10-PCS | Mod: CPTII,,, | Performed by: NURSE PRACTITIONER

## 2022-03-21 PROCEDURE — 1101F PT FALLS ASSESS-DOCD LE1/YR: CPT | Mod: CPTII,,, | Performed by: NURSE PRACTITIONER

## 2022-03-21 PROCEDURE — 1159F PR MEDICATION LIST DOCUMENTED IN MEDICAL RECORD: ICD-10-PCS | Mod: CPTII,,, | Performed by: NURSE PRACTITIONER

## 2022-03-21 PROCEDURE — 3044F HG A1C LEVEL LT 7.0%: CPT | Mod: CPTII,,, | Performed by: NURSE PRACTITIONER

## 2022-03-21 PROCEDURE — 1101F PR PT FALLS ASSESS DOC 0-1 FALLS W/OUT INJ PAST YR: ICD-10-PCS | Mod: CPTII,,, | Performed by: NURSE PRACTITIONER

## 2022-03-21 PROCEDURE — 99215 OFFICE O/P EST HI 40 MIN: CPT | Mod: PBBFAC | Performed by: NURSE PRACTITIONER

## 2022-03-21 PROCEDURE — 3288F PR FALLS RISK ASSESSMENT DOCUMENTED: ICD-10-PCS | Mod: CPTII,,, | Performed by: NURSE PRACTITIONER

## 2022-03-21 PROCEDURE — 99213 OFFICE O/P EST LOW 20 MIN: CPT | Mod: S$PBB,,, | Performed by: NURSE PRACTITIONER

## 2022-03-21 PROCEDURE — 3078F PR MOST RECENT DIASTOLIC BLOOD PRESSURE < 80 MM HG: ICD-10-PCS | Mod: CPTII,,, | Performed by: NURSE PRACTITIONER

## 2022-03-21 RX ORDER — CETIRIZINE HYDROCHLORIDE 10 MG/1
10 TABLET ORAL DAILY
COMMUNITY
End: 2022-10-24

## 2022-03-21 RX ORDER — SITAGLIPTIN AND METFORMIN HYDROCHLORIDE 1000; 50 MG/1; MG/1
TABLET, FILM COATED, EXTENDED RELEASE ORAL
Qty: 180 TABLET | Refills: 3 | Status: SHIPPED | OUTPATIENT
Start: 2022-03-21 | End: 2024-01-08 | Stop reason: SINTOL

## 2022-03-21 RX ORDER — FLUTICASONE PROPIONATE 50 MCG
1 SPRAY, SUSPENSION (ML) NASAL DAILY
COMMUNITY
End: 2022-03-21 | Stop reason: SDUPTHER

## 2022-03-21 RX ORDER — FLUTICASONE PROPIONATE 50 MCG
1 SPRAY, SUSPENSION (ML) NASAL DAILY
Qty: 9.9 ML | Refills: 3 | Status: SHIPPED | OUTPATIENT
Start: 2022-03-21 | End: 2023-04-04 | Stop reason: SDUPTHER

## 2022-03-21 NOTE — PROGRESS NOTES
Subjective:       Patient ID: Fatmata Tolbert is a 76 y.o. female.    Chief Complaint: Diabetes Mellitus (Pt here for follow up visit, reports she had an ablation 2/23/22, and her palpatations are better.  Checks sugar 1 x day.)    Here today for routine follow up.  Her last a1c 6 weeks ago was well controlled at 6.4.  She has had a cardiac ablation since last visit and is doing very well since that time.     Review of Systems   Constitutional: Negative for activity change, appetite change, diaphoresis and fatigue.   HENT: Negative for nasal congestion, facial swelling and sinus pressure/congestion.    Eyes: Negative for visual disturbance.   Respiratory: Negative for shortness of breath and wheezing.    Cardiovascular: Negative for chest pain and leg swelling.   Gastrointestinal: Negative for constipation, diarrhea, nausea and vomiting.   Endocrine: Negative for polydipsia, polyphagia and polyuria.   Genitourinary: Negative for dysuria, frequency and urgency.   Musculoskeletal: Negative for gait problem and myalgias.   Integumentary:  Negative for color change, rash and wound.   Neurological: Negative for dizziness, syncope, weakness, headaches, disturbances in coordination and coordination difficulties.   Hematological: Does not bruise/bleed easily.   Psychiatric/Behavioral: Negative for self-injury, sleep disturbance and suicidal ideas. The patient is not nervous/anxious.          Objective:      Physical Exam  Vitals and nursing note reviewed.   Constitutional:       Appearance: Normal appearance.   HENT:      Head: Normocephalic.   Cardiovascular:      Rate and Rhythm: Normal rate.   Pulmonary:      Effort: Pulmonary effort is normal.   Musculoskeletal:         General: Normal range of motion.   Skin:     General: Skin is warm and dry.   Neurological:      General: No focal deficit present.      Mental Status: She is alert and oriented to person, place, and time.   Psychiatric:         Mood and Affect: Mood  normal.         Behavior: Behavior normal.         Thought Content: Thought content normal.         Judgment: Judgment normal.         Assessment:       Problem List Items Addressed This Visit        Cardiac/Vascular    HTN (hypertension)    Hyperlipidemia       Endocrine    Diabetes mellitus, type 2 - Primary    Relevant Medications    SITagliptan-metformin (JANUMET XR) 50-1,000 mg TM24    Other Relevant Orders    POCT Glucose, Hand-Held Device (Completed)       GI    GERD (gastroesophageal reflux disease)       Other    ANNA (obstructive sleep apnea)          Plan:       Problem List Items Addressed This Visit        Cardiac/Vascular    HTN (hypertension)    Hyperlipidemia       Endocrine    Diabetes mellitus, type 2 - Primary    Relevant Medications    SITagliptan-metformin (JANUMET XR) 50-1,000 mg TM24    Other Relevant Orders    POCT Glucose, Hand-Held Device (Completed)       GI    GERD (gastroesophageal reflux disease)       Other    ANNA (obstructive sleep apnea)        Change janumet to XR.  No other changes.

## 2022-06-06 ENCOUNTER — OFFICE VISIT (OUTPATIENT)
Dept: FAMILY MEDICINE | Facility: CLINIC | Age: 77
End: 2022-06-06
Payer: COMMERCIAL

## 2022-06-06 VITALS
SYSTOLIC BLOOD PRESSURE: 122 MMHG | HEART RATE: 68 BPM | BODY MASS INDEX: 24.72 KG/M2 | WEIGHT: 153.81 LBS | RESPIRATION RATE: 18 BRPM | OXYGEN SATURATION: 98 % | DIASTOLIC BLOOD PRESSURE: 67 MMHG | HEIGHT: 66 IN | TEMPERATURE: 98 F

## 2022-06-06 DIAGNOSIS — R11.0 NAUSEA: ICD-10-CM

## 2022-06-06 DIAGNOSIS — E78.5 HYPERLIPIDEMIA, UNSPECIFIED HYPERLIPIDEMIA TYPE: ICD-10-CM

## 2022-06-06 DIAGNOSIS — F32.A DEPRESSION, UNSPECIFIED DEPRESSION TYPE: Primary | ICD-10-CM

## 2022-06-06 DIAGNOSIS — Z79.890 HORMONE REPLACEMENT THERAPY (POSTMENOPAUSAL): ICD-10-CM

## 2022-06-06 PROCEDURE — 3044F PR MOST RECENT HEMOGLOBIN A1C LEVEL <7.0%: ICD-10-PCS | Mod: CPTII,,, | Performed by: FAMILY MEDICINE

## 2022-06-06 PROCEDURE — 1160F PR REVIEW ALL MEDS BY PRESCRIBER/CLIN PHARMACIST DOCUMENTED: ICD-10-PCS | Mod: CPTII,,, | Performed by: FAMILY MEDICINE

## 2022-06-06 PROCEDURE — 1159F PR MEDICATION LIST DOCUMENTED IN MEDICAL RECORD: ICD-10-PCS | Mod: CPTII,,, | Performed by: FAMILY MEDICINE

## 2022-06-06 PROCEDURE — 3074F PR MOST RECENT SYSTOLIC BLOOD PRESSURE < 130 MM HG: ICD-10-PCS | Mod: CPTII,,, | Performed by: FAMILY MEDICINE

## 2022-06-06 PROCEDURE — 99214 OFFICE O/P EST MOD 30 MIN: CPT | Mod: ,,, | Performed by: FAMILY MEDICINE

## 2022-06-06 PROCEDURE — 1159F MED LIST DOCD IN RCRD: CPT | Mod: CPTII,,, | Performed by: FAMILY MEDICINE

## 2022-06-06 PROCEDURE — 1101F PR PT FALLS ASSESS DOC 0-1 FALLS W/OUT INJ PAST YR: ICD-10-PCS | Mod: CPTII,,, | Performed by: FAMILY MEDICINE

## 2022-06-06 PROCEDURE — 99214 PR OFFICE/OUTPT VISIT, EST, LEVL IV, 30-39 MIN: ICD-10-PCS | Mod: ,,, | Performed by: FAMILY MEDICINE

## 2022-06-06 PROCEDURE — 3044F HG A1C LEVEL LT 7.0%: CPT | Mod: CPTII,,, | Performed by: FAMILY MEDICINE

## 2022-06-06 PROCEDURE — 3078F PR MOST RECENT DIASTOLIC BLOOD PRESSURE < 80 MM HG: ICD-10-PCS | Mod: CPTII,,, | Performed by: FAMILY MEDICINE

## 2022-06-06 PROCEDURE — 3074F SYST BP LT 130 MM HG: CPT | Mod: CPTII,,, | Performed by: FAMILY MEDICINE

## 2022-06-06 PROCEDURE — 3288F FALL RISK ASSESSMENT DOCD: CPT | Mod: CPTII,,, | Performed by: FAMILY MEDICINE

## 2022-06-06 PROCEDURE — 3078F DIAST BP <80 MM HG: CPT | Mod: CPTII,,, | Performed by: FAMILY MEDICINE

## 2022-06-06 PROCEDURE — 1160F RVW MEDS BY RX/DR IN RCRD: CPT | Mod: CPTII,,, | Performed by: FAMILY MEDICINE

## 2022-06-06 PROCEDURE — 1101F PT FALLS ASSESS-DOCD LE1/YR: CPT | Mod: CPTII,,, | Performed by: FAMILY MEDICINE

## 2022-06-06 PROCEDURE — 3288F PR FALLS RISK ASSESSMENT DOCUMENTED: ICD-10-PCS | Mod: CPTII,,, | Performed by: FAMILY MEDICINE

## 2022-06-06 RX ORDER — MIRTAZAPINE 7.5 MG/1
7.5 TABLET, FILM COATED ORAL NIGHTLY
Qty: 30 TABLET | Refills: 2 | Status: SHIPPED | OUTPATIENT
Start: 2022-06-06 | End: 2022-07-19 | Stop reason: SDUPTHER

## 2022-06-06 RX ORDER — AMLODIPINE BESYLATE 5 MG/1
5 TABLET ORAL
COMMUNITY
Start: 2022-04-04 | End: 2023-09-20 | Stop reason: SDUPTHER

## 2022-06-06 RX ORDER — ONDANSETRON 4 MG/1
4 TABLET, FILM COATED ORAL EVERY 6 HOURS PRN
Qty: 30 TABLET | Refills: 2 | Status: SHIPPED | OUTPATIENT
Start: 2022-06-06 | End: 2023-04-04 | Stop reason: SDUPTHER

## 2022-06-06 RX ORDER — PRAVASTATIN SODIUM 20 MG/1
20 TABLET ORAL NIGHTLY
Qty: 90 TABLET | Refills: 1 | Status: SHIPPED | OUTPATIENT
Start: 2022-06-06 | End: 2022-06-21 | Stop reason: SDUPTHER

## 2022-06-06 NOTE — PROGRESS NOTES
[facility]  Chief Complaint      Chief Complaint   Patient presents with    Follow-up       History of Present Illness      Fatmata Tolbert is a 76 y.o. female with chronic conditions of HTN, ANNA, HFrEF, Normocytic Anemia, GERD, DM2 who presents today for follow-up/med refills. Patient endorses ongoing nausea for the last 6 mos; taking zofran PRN with some relief. Patient has appt with GI in September, but pt going to call to inquire about earlier appt. Denies any dysphagia, worsening GERD, abd pain, or other sx. Patient also endorses some issues with low mood, poor sleep, and poor appetite; endorses some depression, and anxiety sx. Patient has had recent life stressors contributing to current depressive sx. Patient denies any thoughts of harming herself, or someone else. Requests Rx for estradiol; patient has been on long-term HRT, and discussed risks of CV issues, Breast CA, etc. Discussed referral to GYN for further discussion of risk vs benefit with HRT; pt declined at this time. Patient does still have issues with hot flashes, and has had a hysterectomy. Chronic conditions otherwise stable on current med regimen. No other acute sx, or events reported.     Past Medical History:  Past Medical History:   Diagnosis Date    Anxiety     Cardiomyopathy     Diabetes mellitus, type 2     Diabetes, polyneuropathy     DJD (degenerative joint disease), multiple sites     GERD (gastroesophageal reflux disease)     HTN (hypertension)     Hyperlipidemia     ANNA (obstructive sleep apnea)     Pseudophakia 02/09/2021       Past Surgical History:   has a past surgical history that includes Breast biopsy and Hysterectomy.    Social History:  Social History     Tobacco Use    Smoking status: Never Smoker    Smokeless tobacco: Never Used   Substance Use Topics    Alcohol use: Never    Drug use: Never       I personally reviewed all past medical, surgical, and social.     Review of Systems    Constitutional: Negative for chills, fatigue and fever.   HENT: Negative for ear pain.    Eyes: Negative for pain and visual disturbance.   Respiratory: Negative for chest tightness and shortness of breath.    Cardiovascular: Negative for chest pain and leg swelling.   Gastrointestinal: Positive for nausea. Negative for abdominal pain, diarrhea and vomiting.   Genitourinary: Negative for difficulty urinating.   Musculoskeletal: Negative for gait problem and myalgias.   Skin: Negative for rash.   Neurological: Negative for dizziness and light-headedness.   Hematological: Does not bruise/bleed easily.   Psychiatric/Behavioral: Positive for dysphoric mood and sleep disturbance. The patient is nervous/anxious.         Medications:  Outpatient Encounter Medications as of 6/6/2022   Medication Sig Dispense Refill    ACCU-CHEK GUIDE GLUCOSE METER Misc 1 strip by Misc.(Non-Drug; Combo Route) route once daily.      ACCU-CHEK SOFTCLIX LANCETS Misc 1 lancet by Misc.(Non-Drug; Combo Route) route once daily. Use to checks usgar one x day.      amLODIPine (NORVASC) 5 MG tablet Take 5 mg by mouth.      aspirin (ECOTRIN) 81 MG EC tablet Take 81 mg by mouth once daily.      blood sugar diagnostic Strp 1 each by Misc.(Non-Drug; Combo Route) route once daily. (Patient taking differently: 1 each by Misc.(Non-Drug; Combo Route) route once daily. FOR ACCUCHECK METER, use to check sugar 1 x day.) 100 strip 3    cetirizine (ZYRTEC) 10 MG tablet Take 10 mg by mouth once daily.      cholecalciferol, vitamin D3, 125 mcg (5,000 unit) Tab Take 5,000 Units by mouth once daily.      famotidine (PEPCID) 20 MG tablet Take 1 tablet (20 mg total) by mouth 2 (two) times daily. 180 tablet 3    fluticasone propionate (FLONASE) 50 mcg/actuation nasal spray 1 spray (50 mcg total) by Each Nostril route once daily. 9.9 mL 3    Lactobacillus rhamnosus GG (CULTURELLE) 10 billion cell capsule Take 1 capsule by mouth once daily.      SORAYA BIOTIN  ORAL Take 3,000 mcg by mouth 2 (two) times a day.      metoprolol succinate (TOPROL-XL) 100 MG 24 hr tablet Take 100 mg by mouth once daily.      omega-3 fatty acids/fish oil (FISH OIL-OMEGA-3 FATTY ACIDS) 300-1,000 mg capsule Take by mouth once daily.      ondansetron (ZOFRAN) 4 MG tablet Take 1 tablet (4 mg total) by mouth every 6 (six) hours as needed for Nausea. 30 tablet 2    SITagliptan-metformin (JANUMET XR) 50-1,000 mg TM24 Take 2 tablets in the am po 180 tablet 3    soy isofla/blk cohosh/mag bark (ESTROVEN ORAL) Take 1 tablet by mouth once daily at 6am.      [DISCONTINUED] ondansetron (ZOFRAN) 4 MG tablet Take 4 mg by mouth.      [DISCONTINUED] pravastatin (PRAVACHOL) 20 MG tablet Take 1 tablet (20 mg total) by mouth nightly. 90 tablet 1    lisinopriL (PRINIVIL,ZESTRIL) 40 MG tablet Take 40 mg by mouth.      mirtazapine (REMERON) 7.5 MG Tab Take 1 tablet (7.5 mg total) by mouth every evening. 30 tablet 2    pravastatin (PRAVACHOL) 20 MG tablet Take 1 tablet (20 mg total) by mouth nightly. 90 tablet 1     No facility-administered encounter medications on file as of 6/6/2022.       Allergies:  Review of patient's allergies indicates:   Allergen Reactions    Augmentin [amoxicillin-pot clavulanate] Diarrhea    Clarithromycin Diarrhea       Health Maintenance:  Immunization History   Administered Date(s) Administered    COVID-19, MRNA, LN-S, PF (MODERNA FULL 0.5 ML DOSE) 02/12/2021, 03/16/2021, 11/05/2021    Influenza - Quadrivalent - High Dose - PF (65 years and older) 01/26/2022    Influenza - Trivalent (ADULT) 01/28/2016      Health Maintenance   Topic Date Due    Hepatitis C Screening  Never done    TETANUS VACCINE  Never done    DEXA Scan  Never done    Eye Exam  02/09/2022    Hemoglobin A1c  07/26/2022    Lipid Panel  10/26/2022    Foot Exam  03/21/2023        Physical Exam      Vital Signs  Temp: 98.3 °F (36.8 °C)  Pulse: 68  Resp: 18  SpO2: 98 %  BP: 122/67  Height and  "Weight  Height: 5' 6" (167.6 cm)  Weight: 69.8 kg (153 lb 12.8 oz)  BSA (Calculated - sq m): 1.8 sq meters  BMI (Calculated): 24.8  Weight in (lb) to have BMI = 25: 154.6]    Physical Exam  Constitutional:       Appearance: Normal appearance.   HENT:      Head: Normocephalic and atraumatic.      Nose: Nose normal.      Mouth/Throat:      Mouth: Mucous membranes are moist.      Pharynx: Oropharynx is clear.   Eyes:      Extraocular Movements: Extraocular movements intact.      Conjunctiva/sclera: Conjunctivae normal.      Pupils: Pupils are equal, round, and reactive to light.   Cardiovascular:      Rate and Rhythm: Normal rate and regular rhythm.      Pulses: Normal pulses.           Radial pulses are 2+ on the right side and 2+ on the left side.      Heart sounds: Normal heart sounds.   Pulmonary:      Effort: Pulmonary effort is normal.      Breath sounds: Normal breath sounds.   Abdominal:      Palpations: Abdomen is soft.      Tenderness: There is no abdominal tenderness.   Musculoskeletal:         General: Normal range of motion.      Right lower leg: No edema.      Left lower leg: No edema.   Skin:     General: Skin is warm and dry.      Findings: No rash.   Neurological:      General: No focal deficit present.      Mental Status: She is alert. Mental status is at baseline.   Psychiatric:         Mood and Affect: Mood normal.          Laboratory:  CBC:  Recent Labs   Lab 06/14/21  1128 07/02/21  1138 02/21/22  0832   WBC 4.64 4.89 5.31   RBC 3.92 L 4.09 L 4.00 L   Hemoglobin 10.9 L 11.2 L 11.5 L   Hematocrit 34.3 L 35.8 L 35.4 L   Platelet Count 197 218 186   MCV 87.5 87.5 88.5   MCH 27.8 27.4 28.8   MCHC 31.8 L 31.3 L 32.5     CMP:  Recent Labs   Lab 05/30/21  1041 06/14/21  1128 07/02/21  1138 02/21/22  0832   Glucose 90 79 96 218 H   Calcium 9.1 8.8 9.2 9.5   Albumin 3.6 3.7 3.8  --    Total Protein 7.1 7.0 7.0  --    Sodium 143 141 143 139   Potassium 4.5 4.6 4.7 4.4   CO2 29 30 33 H 30   Chloride 111 H " 106 108 H 105   BUN 20 H 14 23 H 22 H   Alk Phos 53 L 49 L 49 L  --    ALT 17 22 21  --    AST 13 L 15 14 L  --    Bilirubin, Total 0.2 0.4 0.4  --      LIPIDS:  Recent Labs   Lab 10/26/21  0920   HDL Cholesterol 71 H   Cholesterol 138   Triglycerides 74   LDL Calculated 52   Cholesterol/HDL Ratio (Risk Factor) 1.9   Non-HDL 67     TSH:      A1C:  Recent Labs   Lab 06/16/21  1333 10/26/21  0920 01/26/22  0905   Hemoglobin A1C 6.6 6.6 6.4       Assessment/Plan     Fatmata Tolbert is a 76 y.o.female with:     1. Hyperlipidemia, unspecified hyperlipidemia type  - pravastatin (PRAVACHOL) 20 MG tablet; Take 1 tablet (20 mg total) by mouth nightly.  Dispense: 90 tablet; Refill: 1    2. Nausea  - ondansetron (ZOFRAN) 4 MG tablet; Take 1 tablet (4 mg total) by mouth every 6 (six) hours as needed for Nausea.  Dispense: 30 tablet; Refill: 2    3. Hormone replacement therapy (postmenopausal)  - discussed risks with long-term HRT; consider referral to GYN for further evaluation of risk vs benefit should patient desire    4. Depression, unspecified depression type  - mirtazapine (REMERON) 7.5 MG Tab; Take 1 tablet (7.5 mg total) by mouth every evening.  Dispense: 30 tablet; Refill: 2  - PHQ-9: 19 indicating moderate severe depression; QUINTON-7 10 indicative of moderate anxiety  - start Remeron at low dose to help with depression, appetite, and sleep; adverse effects discussed; RTC 6 wks to evaluate sx improvement     Total time spent face-to-face and non-face-to-face coordinating care for this encounter was: 45 min    Chronic conditions status updated as per HPI.  Other than changes above, cont current medications and maintain follow up with specialists.  Return to clinic in 6 wks.    Yrn Kimball DO   [facility]

## 2022-06-07 ENCOUNTER — OFFICE VISIT (OUTPATIENT)
Dept: GASTROENTEROLOGY | Facility: CLINIC | Age: 77
End: 2022-06-07
Payer: COMMERCIAL

## 2022-06-07 VITALS
WEIGHT: 154.31 LBS | HEART RATE: 92 BPM | HEIGHT: 66 IN | BODY MASS INDEX: 24.8 KG/M2 | OXYGEN SATURATION: 97 % | DIASTOLIC BLOOD PRESSURE: 66 MMHG | SYSTOLIC BLOOD PRESSURE: 134 MMHG

## 2022-06-07 DIAGNOSIS — D64.9 ANEMIA, UNSPECIFIED TYPE: ICD-10-CM

## 2022-06-07 DIAGNOSIS — R11.0 NAUSEA: Primary | ICD-10-CM

## 2022-06-07 PROCEDURE — 1101F PR PT FALLS ASSESS DOC 0-1 FALLS W/OUT INJ PAST YR: ICD-10-PCS | Mod: CPTII,,, | Performed by: NURSE PRACTITIONER

## 2022-06-07 PROCEDURE — 3044F PR MOST RECENT HEMOGLOBIN A1C LEVEL <7.0%: ICD-10-PCS | Mod: CPTII,,, | Performed by: NURSE PRACTITIONER

## 2022-06-07 PROCEDURE — 1160F RVW MEDS BY RX/DR IN RCRD: CPT | Mod: CPTII,,, | Performed by: NURSE PRACTITIONER

## 2022-06-07 PROCEDURE — 3288F FALL RISK ASSESSMENT DOCD: CPT | Mod: CPTII,,, | Performed by: NURSE PRACTITIONER

## 2022-06-07 PROCEDURE — 3078F DIAST BP <80 MM HG: CPT | Mod: CPTII,,, | Performed by: NURSE PRACTITIONER

## 2022-06-07 PROCEDURE — 99214 PR OFFICE/OUTPT VISIT, EST, LEVL IV, 30-39 MIN: ICD-10-PCS | Mod: ,,, | Performed by: NURSE PRACTITIONER

## 2022-06-07 PROCEDURE — 3044F HG A1C LEVEL LT 7.0%: CPT | Mod: CPTII,,, | Performed by: NURSE PRACTITIONER

## 2022-06-07 PROCEDURE — 3075F PR MOST RECENT SYSTOLIC BLOOD PRESS GE 130-139MM HG: ICD-10-PCS | Mod: CPTII,,, | Performed by: NURSE PRACTITIONER

## 2022-06-07 PROCEDURE — 1160F PR REVIEW ALL MEDS BY PRESCRIBER/CLIN PHARMACIST DOCUMENTED: ICD-10-PCS | Mod: CPTII,,, | Performed by: NURSE PRACTITIONER

## 2022-06-07 PROCEDURE — 3288F PR FALLS RISK ASSESSMENT DOCUMENTED: ICD-10-PCS | Mod: CPTII,,, | Performed by: NURSE PRACTITIONER

## 2022-06-07 PROCEDURE — 1159F MED LIST DOCD IN RCRD: CPT | Mod: CPTII,,, | Performed by: NURSE PRACTITIONER

## 2022-06-07 PROCEDURE — 3078F PR MOST RECENT DIASTOLIC BLOOD PRESSURE < 80 MM HG: ICD-10-PCS | Mod: CPTII,,, | Performed by: NURSE PRACTITIONER

## 2022-06-07 PROCEDURE — 99214 OFFICE O/P EST MOD 30 MIN: CPT | Mod: ,,, | Performed by: NURSE PRACTITIONER

## 2022-06-07 PROCEDURE — 3075F SYST BP GE 130 - 139MM HG: CPT | Mod: CPTII,,, | Performed by: NURSE PRACTITIONER

## 2022-06-07 PROCEDURE — 1101F PT FALLS ASSESS-DOCD LE1/YR: CPT | Mod: CPTII,,, | Performed by: NURSE PRACTITIONER

## 2022-06-07 PROCEDURE — 1159F PR MEDICATION LIST DOCUMENTED IN MEDICAL RECORD: ICD-10-PCS | Mod: CPTII,,, | Performed by: NURSE PRACTITIONER

## 2022-06-07 NOTE — PROGRESS NOTES
Fatmata Tolbert is a 76 y.o. female here for Nausea        PCP: Pablo Draper  Referring Provider: No referring provider defined for this encounter.     HPI:  Presents with c/o of nausea. Patient has gastroparesis. States that nausea is intermittent. Reports that she feels like she needs to eat more but is not able to due to early satiety. Denies vomiting. Does take Pepcid. Not on PPI due to history of CDIFF. Last EGD 9/30/19. Bowels are moving without difficulty. No diarrhea. No hematochezia or melena. Does have a gallbladder. Discussed the importance of diet control with low residue and small frequent meals. She has lost 5 lbs since Dec. 2021. Last colonoscopy 12/18/19 TA x 2.        ROS:  Review of Systems   Constitutional: Positive for appetite change (early satiety). Negative for fatigue, fever and unexpected weight change.   HENT: Negative for trouble swallowing.    Respiratory: Negative for shortness of breath.    Cardiovascular: Negative for chest pain.   Gastrointestinal: Positive for nausea. Negative for abdominal pain, blood in stool, change in bowel habit, constipation, diarrhea, vomiting, reflux and change in bowel habit.   Genitourinary: Negative for dysuria.   Musculoskeletal: Positive for back pain (chronic low). Negative for gait problem.   Integumentary:  Negative for pallor.   Neurological: Negative for dizziness and light-headedness.   Psychiatric/Behavioral: The patient is not nervous/anxious.           PMHX:  has a past medical history of Anxiety, Cardiomyopathy, Diabetes mellitus, type 2, Diabetes, polyneuropathy, DJD (degenerative joint disease), multiple sites, GERD (gastroesophageal reflux disease), HTN (hypertension), Hyperlipidemia, ANNA (obstructive sleep apnea), and Pseudophakia (02/09/2021).    PSHX:  has a past surgical history that includes Breast biopsy and Hysterectomy.    PFHX: family history includes Breast cancer in her maternal grandmother and sister; Diabetes in her  mother; Heart disease in her father and sister; Hypertension in her father and sister; Kidney disease in her brother; Prostate cancer in her father; Stroke in her brother, brother, mother, and sister.    PSlHX:  reports that she has never smoked. She has never used smokeless tobacco. She reports that she does not drink alcohol and does not use drugs.        Review of patient's allergies indicates:   Allergen Reactions    Augmentin [amoxicillin-pot clavulanate] Diarrhea    Clarithromycin Diarrhea       Medication List with Changes/Refills   Current Medications    ACCU-CHEK GUIDE GLUCOSE METER MISC    1 strip by Misc.(Non-Drug; Combo Route) route once daily.    ACCU-CHEK SOFTCLIX LANCETS MISC    1 lancet by Misc.(Non-Drug; Combo Route) route once daily. Use to checks usgar one x day.    AMLODIPINE (NORVASC) 5 MG TABLET    Take 5 mg by mouth.    ASPIRIN (ECOTRIN) 81 MG EC TABLET    Take 81 mg by mouth once daily.    BLOOD SUGAR DIAGNOSTIC STRP    1 each by Misc.(Non-Drug; Combo Route) route once daily.    CETIRIZINE (ZYRTEC) 10 MG TABLET    Take 10 mg by mouth once daily.    CHOLECALCIFEROL, VITAMIN D3, 125 MCG (5,000 UNIT) TAB    Take 5,000 Units by mouth once daily.    FAMOTIDINE (PEPCID) 20 MG TABLET    Take 1 tablet (20 mg total) by mouth 2 (two) times daily.    FLUTICASONE PROPIONATE (FLONASE) 50 MCG/ACTUATION NASAL SPRAY    1 spray (50 mcg total) by Each Nostril route once daily.    LACTOBACILLUS RHAMNOSUS GG (CULTURELLE) 10 BILLION CELL CAPSULE    Take 1 capsule by mouth once daily.    LISINOPRIL (PRINIVIL,ZESTRIL) 40 MG TABLET    Take 40 mg by mouth.    SORAYA BIOTIN ORAL    Take 3,000 mcg by mouth 2 (two) times a day.    METOPROLOL SUCCINATE (TOPROL-XL) 100 MG 24 HR TABLET    Take 100 mg by mouth once daily.    MIRTAZAPINE (REMERON) 7.5 MG TAB    Take 1 tablet (7.5 mg total) by mouth every evening.    OMEGA-3 FATTY ACIDS/FISH OIL (FISH OIL-OMEGA-3 FATTY ACIDS) 300-1,000 MG CAPSULE    Take by mouth once daily.  "   ONDANSETRON (ZOFRAN) 4 MG TABLET    Take 1 tablet (4 mg total) by mouth every 6 (six) hours as needed for Nausea.    PRAVASTATIN (PRAVACHOL) 20 MG TABLET    Take 1 tablet (20 mg total) by mouth nightly.    SITAGLIPTAN-METFORMIN (JANUMET XR) 50-1,000 MG TM24    Take 2 tablets in the am po    SOY ISOFLA/BLK COHOSH/MAG BARK (ESTROVEN ORAL)    Take 1 tablet by mouth once daily at 6am.        Objective Findings:  Vital Signs:  /66   Pulse 92   Ht 5' 6" (1.676 m)   Wt 70 kg (154 lb 4.8 oz)   SpO2 97%   BMI 24.90 kg/m²  Body mass index is 24.9 kg/m².    Physical Exam:  Physical Exam  Vitals and nursing note reviewed.   Constitutional:       General: She is not in acute distress.     Appearance: Normal appearance.   HENT:      Mouth/Throat:      Mouth: Mucous membranes are moist.   Cardiovascular:      Rate and Rhythm: Normal rate and regular rhythm.   Pulmonary:      Breath sounds: No wheezing, rhonchi or rales.   Abdominal:      General: Bowel sounds are normal. There is no distension.      Palpations: Abdomen is soft. There is no mass.      Tenderness: There is no abdominal tenderness. There is no guarding or rebound.      Hernia: No hernia is present.   Musculoskeletal:      Right lower leg: No edema.      Left lower leg: No edema.   Skin:     General: Skin is warm and dry.      Coloration: Skin is not jaundiced or pale.   Neurological:      Mental Status: She is alert and oriented to person, place, and time.   Psychiatric:         Mood and Affect: Mood normal.          Labs:  Lab Results   Component Value Date    WBC 5.31 02/21/2022    HGB 11.5 (L) 02/21/2022    HCT 35.4 (L) 02/21/2022    MCV 88.5 02/21/2022    RDW 13.5 02/21/2022     02/21/2022    LYMPH 40.9 02/21/2022    LYMPH 2.17 02/21/2022    MONO 6.2 (H) 02/21/2022    EOS 0.09 02/21/2022    BASO 0.02 02/21/2022     Lab Results   Component Value Date     02/21/2022    K 4.4 02/21/2022     02/21/2022    CO2 30 02/21/2022     " (H) 02/21/2022    BUN 22 (H) 02/21/2022    CREATININE 0.97 02/21/2022    CALCIUM 9.5 02/21/2022    PROT 7.0 07/02/2021    ALBUMIN 3.8 07/02/2021    BILITOT 0.4 07/02/2021    ALKPHOS 49 (L) 07/02/2021    AST 14 (L) 07/02/2021    ALT 21 07/02/2021         Imaging: No results found.      Assessment:  Fatmata Tolbert is a 76 y.o. female here with:  1. Nausea    2. Anemia, unspecified type          Recommendations:  1. Abdominal ultrasound  2. Consider EGD  3. Do not lay down within 3 hours of eating.  Avoid spicy, greasy foods  Eat 6-8 small meals per day.  Exercise 150 minutes per week  Avoid raw fruits and vegetables  4. Gastroparesis diet written instructions given to the patient  5. Labs today      Follow up in about 4 weeks (around 7/5/2022).      Order summary:  Orders Placed This Encounter    US Abdomen Complete    Iron and TIBC    Ferritin    Hemoglobin and Hematocrit    Protein Electrophoresis, Serum with Reflex MAHSA       Thank you for allowing me to participate in the care of Fatmata Tolbert.      NAVNEET Solares

## 2022-06-07 NOTE — PATIENT INSTRUCTIONS
Gastroparesis diet    Do not lay down within 3 hours of eating.  Avoid spicy, greasy foods  Eat 6-8 small meals per day.  Exercise 150 minutes per week  Avoid raw fruits and vegetables

## 2022-06-21 ENCOUNTER — HOSPITAL ENCOUNTER (OUTPATIENT)
Dept: RADIOLOGY | Facility: HOSPITAL | Age: 77
Discharge: HOME | End: 2022-06-21
Attending: NURSE PRACTITIONER
Payer: MEDICARE

## 2022-06-21 DIAGNOSIS — E78.5 HYPERLIPIDEMIA, UNSPECIFIED HYPERLIPIDEMIA TYPE: ICD-10-CM

## 2022-06-21 DIAGNOSIS — R11.0 NAUSEA: ICD-10-CM

## 2022-06-21 PROCEDURE — 76700 US EXAM ABDOM COMPLETE: CPT | Mod: 26,,, | Performed by: STUDENT IN AN ORGANIZED HEALTH CARE EDUCATION/TRAINING PROGRAM

## 2022-06-21 PROCEDURE — 76700 US ABDOMEN COMPLETE: ICD-10-PCS | Mod: 26,,, | Performed by: STUDENT IN AN ORGANIZED HEALTH CARE EDUCATION/TRAINING PROGRAM

## 2022-06-21 PROCEDURE — 76700 US EXAM ABDOM COMPLETE: CPT | Mod: TC

## 2022-06-22 ENCOUNTER — HOSPITAL ENCOUNTER (OUTPATIENT)
Dept: RADIOLOGY | Facility: HOSPITAL | Age: 77
Discharge: HOME | End: 2022-06-22
Payer: MEDICARE

## 2022-06-22 DIAGNOSIS — Z12.31 VISIT FOR SCREENING MAMMOGRAM: ICD-10-CM

## 2022-06-22 PROCEDURE — 77067 SCR MAMMO BI INCL CAD: CPT | Mod: 26,,, | Performed by: RADIOLOGY

## 2022-06-22 PROCEDURE — 77067 MAMMO DIGITAL SCREENING BILAT: ICD-10-PCS | Mod: 26,,, | Performed by: RADIOLOGY

## 2022-06-22 PROCEDURE — 77067 SCR MAMMO BI INCL CAD: CPT | Mod: TC

## 2022-06-23 ENCOUNTER — TELEPHONE (OUTPATIENT)
Dept: GASTROENTEROLOGY | Facility: CLINIC | Age: 77
End: 2022-06-23
Payer: MEDICARE

## 2022-06-23 RX ORDER — PRAVASTATIN SODIUM 20 MG/1
20 TABLET ORAL NIGHTLY
Qty: 90 TABLET | Refills: 1 | Status: SHIPPED | OUTPATIENT
Start: 2022-06-23 | End: 2023-01-09 | Stop reason: SDUPTHER

## 2022-06-23 NOTE — TELEPHONE ENCOUNTER
Results called to patient. Verbalized understanding.      ----- Message from MELI Yeh sent at 6/23/2022  4:35 PM CDT -----  Right side non-obstructing renal stone. No other acute abnormality. No gallstone

## 2022-07-07 ENCOUNTER — OFFICE VISIT (OUTPATIENT)
Dept: GASTROENTEROLOGY | Facility: CLINIC | Age: 77
End: 2022-07-07
Payer: MEDICARE

## 2022-07-07 VITALS
DIASTOLIC BLOOD PRESSURE: 61 MMHG | HEART RATE: 91 BPM | SYSTOLIC BLOOD PRESSURE: 103 MMHG | HEIGHT: 66 IN | OXYGEN SATURATION: 98 % | BODY MASS INDEX: 25.17 KG/M2 | WEIGHT: 156.63 LBS

## 2022-07-07 DIAGNOSIS — K31.84 GASTROPARESIS: Primary | ICD-10-CM

## 2022-07-07 DIAGNOSIS — K21.00 GASTROESOPHAGEAL REFLUX DISEASE WITH ESOPHAGITIS WITHOUT HEMORRHAGE: ICD-10-CM

## 2022-07-07 DIAGNOSIS — R11.0 NAUSEA: ICD-10-CM

## 2022-07-07 PROCEDURE — 99214 OFFICE O/P EST MOD 30 MIN: CPT | Mod: ,,, | Performed by: NURSE PRACTITIONER

## 2022-07-07 PROCEDURE — 3288F FALL RISK ASSESSMENT DOCD: CPT | Mod: CPTII,,, | Performed by: NURSE PRACTITIONER

## 2022-07-07 PROCEDURE — 3078F DIAST BP <80 MM HG: CPT | Mod: CPTII,,, | Performed by: NURSE PRACTITIONER

## 2022-07-07 PROCEDURE — 1159F MED LIST DOCD IN RCRD: CPT | Mod: CPTII,,, | Performed by: NURSE PRACTITIONER

## 2022-07-07 PROCEDURE — 3074F PR MOST RECENT SYSTOLIC BLOOD PRESSURE < 130 MM HG: ICD-10-PCS | Mod: CPTII,,, | Performed by: NURSE PRACTITIONER

## 2022-07-07 PROCEDURE — 99214 PR OFFICE/OUTPT VISIT, EST, LEVL IV, 30-39 MIN: ICD-10-PCS | Mod: ,,, | Performed by: NURSE PRACTITIONER

## 2022-07-07 PROCEDURE — 3288F PR FALLS RISK ASSESSMENT DOCUMENTED: ICD-10-PCS | Mod: CPTII,,, | Performed by: NURSE PRACTITIONER

## 2022-07-07 PROCEDURE — 1101F PT FALLS ASSESS-DOCD LE1/YR: CPT | Mod: CPTII,,, | Performed by: NURSE PRACTITIONER

## 2022-07-07 PROCEDURE — 1160F RVW MEDS BY RX/DR IN RCRD: CPT | Mod: CPTII,,, | Performed by: NURSE PRACTITIONER

## 2022-07-07 PROCEDURE — 1159F PR MEDICATION LIST DOCUMENTED IN MEDICAL RECORD: ICD-10-PCS | Mod: CPTII,,, | Performed by: NURSE PRACTITIONER

## 2022-07-07 PROCEDURE — 3074F SYST BP LT 130 MM HG: CPT | Mod: CPTII,,, | Performed by: NURSE PRACTITIONER

## 2022-07-07 PROCEDURE — 1101F PR PT FALLS ASSESS DOC 0-1 FALLS W/OUT INJ PAST YR: ICD-10-PCS | Mod: CPTII,,, | Performed by: NURSE PRACTITIONER

## 2022-07-07 PROCEDURE — 3078F PR MOST RECENT DIASTOLIC BLOOD PRESSURE < 80 MM HG: ICD-10-PCS | Mod: CPTII,,, | Performed by: NURSE PRACTITIONER

## 2022-07-07 PROCEDURE — 1160F PR REVIEW ALL MEDS BY PRESCRIBER/CLIN PHARMACIST DOCUMENTED: ICD-10-PCS | Mod: CPTII,,, | Performed by: NURSE PRACTITIONER

## 2022-07-07 NOTE — PATIENT INSTRUCTIONS
Avoid spicy, greasy foods  Avoid caffeine, citric acid, chocolate, peppermint, and carbonated drinks  Do not lay down within 3 hours of eating  Exercise 150 minutes per week  Increase fluid to 64 ounces daily  Avoid antiinflammatory medications such as motrin, advil, aleve, ibuprofen, and BC powder  Avoid raw fruits and vegetables

## 2022-07-07 NOTE — PROGRESS NOTES
Fatmata Tolbert is a 77 y.o. female here for Follow-up        PCP: Yrn Kimball  Referring Provider: No referring provider defined for this encounter.     HPI:  Presents for follow up epigastric discomfort and nausea. Patient does have gastroparesis. She is following gastroparesis diet. Weight is up 2 lbs since last visit. Abdominal ultrasound did not show and acute changes.No cholelithiasis.Right non-obstructing renal stone. Last EGD 9/30/19. States that pain and nausea are constant but increase in intensity at times. She is tearful during exam and admits to depression. States she was started on Remeron a month ago. States she does sleep better at night. Continues to have anxiety and depression. She has a history of Cdiff. Denies diarrhea. No hematochezia or melena.        ROS:  Review of Systems   Constitutional: Negative for appetite change, fatigue, fever and unexpected weight change.   HENT: Negative for trouble swallowing.    Respiratory: Negative for shortness of breath.    Cardiovascular: Negative for chest pain and palpitations.   Gastrointestinal: Positive for nausea and reflux. Negative for abdominal pain, blood in stool, change in bowel habit, constipation, diarrhea, vomiting, fecal incontinence and change in bowel habit.   Musculoskeletal: Negative for gait problem.   Integumentary:  Negative for pallor.   Neurological: Negative for dizziness and light-headedness.   Hematological: Does not bruise/bleed easily.   Psychiatric/Behavioral: Positive for dysphoric mood. The patient is nervous/anxious.           PMHX:  has a past medical history of Anxiety, Cardiomyopathy, Diabetes mellitus, type 2, Diabetes, polyneuropathy, DJD (degenerative joint disease), multiple sites, GERD (gastroesophageal reflux disease), HTN (hypertension), Hyperlipidemia, ANNA (obstructive sleep apnea), and Pseudophakia (02/09/2021).    PSHX:  has a past surgical history that includes Breast biopsy and Hysterectomy.    PFHX:  family history includes Breast cancer in her maternal grandmother and sister; Diabetes in her mother; Heart disease in her father and sister; Hypertension in her father and sister; Kidney disease in her brother; Prostate cancer in her father; Stroke in her brother, brother, mother, and sister.    PSlHX:  reports that she has never smoked. She has never used smokeless tobacco. She reports that she does not drink alcohol and does not use drugs.        Review of patient's allergies indicates:   Allergen Reactions    Augmentin [amoxicillin-pot clavulanate] Diarrhea    Clarithromycin Diarrhea       Medication List with Changes/Refills   Current Medications    ACCU-CHEK GUIDE GLUCOSE METER MISC    1 strip by Misc.(Non-Drug; Combo Route) route once daily.    ACCU-CHEK SOFTCLIX LANCETS MISC    1 lancet by Misc.(Non-Drug; Combo Route) route once daily. Use to checks usgar one x day.    AMLODIPINE (NORVASC) 5 MG TABLET    Take 5 mg by mouth.    ASPIRIN (ECOTRIN) 81 MG EC TABLET    Take 81 mg by mouth once daily.    BLOOD SUGAR DIAGNOSTIC STRP    1 each by Misc.(Non-Drug; Combo Route) route once daily.    CETIRIZINE (ZYRTEC) 10 MG TABLET    Take 10 mg by mouth once daily.    CHOLECALCIFEROL, VITAMIN D3, 125 MCG (5,000 UNIT) TAB    Take 5,000 Units by mouth once daily.    FAMOTIDINE (PEPCID) 20 MG TABLET    Take 1 tablet (20 mg total) by mouth 2 (two) times daily.    FLUTICASONE PROPIONATE (FLONASE) 50 MCG/ACTUATION NASAL SPRAY    1 spray (50 mcg total) by Each Nostril route once daily.    LACTOBACILLUS RHAMNOSUS GG (CULTURELLE) 10 BILLION CELL CAPSULE    Take 1 capsule by mouth once daily.    LISINOPRIL (PRINIVIL,ZESTRIL) 40 MG TABLET    Take 40 mg by mouth.    SORAYA BIOTIN ORAL    Take 3,000 mcg by mouth 2 (two) times a day.    METOPROLOL SUCCINATE (TOPROL-XL) 100 MG 24 HR TABLET    Take 100 mg by mouth once daily.    MIRTAZAPINE (REMERON) 7.5 MG TAB    Take 1 tablet (7.5 mg total) by mouth every evening.    OMEGA-3 FATTY  "ACIDS/FISH OIL (FISH OIL-OMEGA-3 FATTY ACIDS) 300-1,000 MG CAPSULE    Take by mouth once daily.    ONDANSETRON (ZOFRAN) 4 MG TABLET    Take 1 tablet (4 mg total) by mouth every 6 (six) hours as needed for Nausea.    PRAVASTATIN (PRAVACHOL) 20 MG TABLET    Take 1 tablet (20 mg total) by mouth nightly.    SITAGLIPTAN-METFORMIN (JANUMET XR) 50-1,000 MG TM24    Take 2 tablets in the am po    SOY ISOFLA/BLK COHOSH/MAG BARK (ESTROVEN ORAL)    Take 1 tablet by mouth once daily at 6am.        Objective Findings:  Vital Signs:  /61   Pulse 91   Ht 5' 6" (1.676 m)   Wt 71 kg (156 lb 9.6 oz)   SpO2 98%   BMI 25.28 kg/m²  Body mass index is 25.28 kg/m².    Physical Exam:  Physical Exam  Vitals and nursing note reviewed.   Constitutional:       General: She is not in acute distress.     Appearance: Normal appearance.   HENT:      Mouth/Throat:      Mouth: Mucous membranes are moist.   Cardiovascular:      Rate and Rhythm: Normal rate and regular rhythm.   Pulmonary:      Breath sounds: No wheezing, rhonchi or rales.   Abdominal:      General: Bowel sounds are normal. There is no distension.      Palpations: Abdomen is soft. There is no mass.      Tenderness: There is no abdominal tenderness. There is no guarding or rebound.      Hernia: No hernia is present.   Skin:     General: Skin is warm and dry.      Coloration: Skin is not jaundiced or pale.   Neurological:      Mental Status: She is alert and oriented to person, place, and time.   Psychiatric:         Mood and Affect: Mood is depressed. Affect is tearful.          Labs:  Lab Results   Component Value Date    WBC 5.31 02/21/2022    HGB 11.2 (L) 06/07/2022    HCT 33.7 (L) 06/07/2022    MCV 88.5 02/21/2022    RDW 13.5 02/21/2022     02/21/2022    LYMPH 40.9 02/21/2022    LYMPH 2.17 02/21/2022    MONO 6.2 (H) 02/21/2022    EOS 0.09 02/21/2022    BASO 0.02 02/21/2022     Lab Results   Component Value Date     02/21/2022    K 4.4 02/21/2022     " 2022    CO2 30 2022     (H) 2022    BUN 22 (H) 2022    CREATININE 0.97 2022    CALCIUM 9.5 2022    PROT 6.9 2022    ALBUMIN 3.8 2021    BILITOT 0.4 2021    ALKPHOS 49 (L) 2021    AST 14 (L) 2021    ALT 21 2021         Imaging: US Abdomen Complete    Result Date: 2022  EXAMINATION: US ABDOMEN COMPLETE CLINICAL HISTORY: Nausea TECHNIQUE: Complete abdominal ultrasound (including pancreas, liver, gallbladder, common bile duct, spleen, aorta, IVC, and kidneys) was performed. COMPARISON:  FINDINGS: Liver: Normal in size, measuring 14 cm. Homogeneous echotexture.  No focal hepatic lesions. Gallbladder: No calculi, wall thickening, or pericholecystic fluid.  No sonographic King's sign. Biliary system: The common duct is not dilated, measuring 1 mm.  No intrahepatic ductal dilatation. Spleen: Normal in size with a homogeneous echotexture, measuring 5 cm. Pancreas: The visualized portions of pancreas appear normal. Right kidney: Nonobstructing right-sided renal calculus measuring 1.1 cm located in the inferior pole right kidney, measuring 10 cm. Left kidney:  Normal in size with no hydronephrosis, measuring 9 cm. Aorta: No aneurysm. Inferior vena cava: Normal in appearance. Miscellaneous: No ascites.     Nonobstructing renal calculus inferior pole right-sided kidney measuring 1.1 cm. Otherwise normal as visualized Electronically signed by: Devon Morales Date:    2022 Time:    13:31    Mammo Digital Screening Bilat    Result Date: 2022  Result: Mammo Digital Screening Bilat History: Patient is 76 y.o. and is seen for a screening mammogram. Positive family history of breast cancer - sister,  at age 66, and grandmother First child born at age 31 Films Compared:1993 through 05/10/2021 Findings: The breasts have scattered areas of fibroglandular density. No suspicious masses or microcalcifications are seen. Again  noted is a low-density 7 mm asymmetry in the posteromedial right breast which has not changed. A breast examination was performed, and no clinically suspicious palpable masses were present.      No radiographic evidence of malignancy. Routine screening mammograms are recommended. BI-RADS Category 1: Negative Recommendation: Routine screening mammogram in 1 year is recommended. Your estimated lifetime risk of breast cancer (to age 85) based on Tyrer-Cuzick risk assessment model is 4.93 %.  According to the [employer] Society, patients with a lifetime breast cancer risk of 20% or higher might benefit from supplemental screening tests. ??         Assessment:  Fatmata Tolbert is a 77 y.o. female here with:  1. Gastroparesis    2. Gastroesophageal reflux disease with esophagitis without hemorrhage    3. Nausea          Recommendations:  1. Do not lay down within 3 hours of eating.  Avoid spicy, greasy foods  Eat 6-8 small meals per day.  Exercise 150 minutes per week  Avoid raw fruits and vegetables  2. Continue Pepcid  3. Schedule EGD and Hida scan    Follow up in about 2 months (around 9/7/2022).      Order summary:  Orders Placed This Encounter    NM Hepatobiliary Scan W Pharm Intervention    EGD       Thank you for allowing me to participate in the care of Fatmata Tolbert.      NAVNEET Solares

## 2022-07-14 ENCOUNTER — HOSPITAL ENCOUNTER (OUTPATIENT)
Dept: GASTROENTEROLOGY | Facility: HOSPITAL | Age: 77
Discharge: HOME | End: 2022-07-14
Attending: NURSE PRACTITIONER
Payer: MEDICARE

## 2022-07-14 ENCOUNTER — ANESTHESIA (OUTPATIENT)
Dept: GASTROENTEROLOGY | Facility: HOSPITAL | Age: 77
End: 2022-07-14
Payer: MEDICARE

## 2022-07-14 ENCOUNTER — ANESTHESIA EVENT (OUTPATIENT)
Dept: GASTROENTEROLOGY | Facility: HOSPITAL | Age: 77
End: 2022-07-14
Payer: MEDICARE

## 2022-07-14 VITALS
OXYGEN SATURATION: 96 % | SYSTOLIC BLOOD PRESSURE: 131 MMHG | HEIGHT: 66 IN | RESPIRATION RATE: 13 BRPM | DIASTOLIC BLOOD PRESSURE: 59 MMHG | HEART RATE: 67 BPM | BODY MASS INDEX: 25.07 KG/M2 | WEIGHT: 156 LBS | TEMPERATURE: 98 F

## 2022-07-14 DIAGNOSIS — K29.00 ACUTE SUPERFICIAL GASTRITIS WITHOUT HEMORRHAGE: ICD-10-CM

## 2022-07-14 DIAGNOSIS — K29.80 DUODENITIS: ICD-10-CM

## 2022-07-14 DIAGNOSIS — R11.0 NAUSEA: ICD-10-CM

## 2022-07-14 DIAGNOSIS — K31.84 GASTROPARESIS: ICD-10-CM

## 2022-07-14 DIAGNOSIS — K44.9 HH (HIATUS HERNIA): ICD-10-CM

## 2022-07-14 PROCEDURE — D9220A PRA ANESTHESIA: ICD-10-PCS | Mod: ,,,

## 2022-07-14 PROCEDURE — 27201423 OPTIME MED/SURG SUP & DEVICES STERILE SUPPLY

## 2022-07-14 PROCEDURE — 25000003 PHARM REV CODE 250

## 2022-07-14 PROCEDURE — 37000008 HC ANESTHESIA 1ST 15 MINUTES

## 2022-07-14 PROCEDURE — D9220A PRA ANESTHESIA: Mod: ,,,

## 2022-07-14 PROCEDURE — C1889 IMPLANT/INSERT DEVICE, NOC: HCPCS

## 2022-07-14 PROCEDURE — 37000009 HC ANESTHESIA EA ADD 15 MINS

## 2022-07-14 PROCEDURE — 27000284 HC CANNULA NASAL

## 2022-07-14 PROCEDURE — 63600175 PHARM REV CODE 636 W HCPCS

## 2022-07-14 PROCEDURE — 43239 EGD BIOPSY SINGLE/MULTIPLE: CPT

## 2022-07-14 PROCEDURE — 27000716 HC OXISENSOR PROBE, ANY SIZE

## 2022-07-14 RX ORDER — SODIUM CHLORIDE 0.9 % (FLUSH) 0.9 %
10 SYRINGE (ML) INJECTION
Status: DISCONTINUED | OUTPATIENT
Start: 2022-07-14 | End: 2022-07-15 | Stop reason: HOSPADM

## 2022-07-14 RX ORDER — PROPOFOL 10 MG/ML
VIAL (ML) INTRAVENOUS
Status: DISCONTINUED | OUTPATIENT
Start: 2022-07-14 | End: 2022-07-14

## 2022-07-14 RX ORDER — LIDOCAINE HYDROCHLORIDE 20 MG/ML
INJECTION INTRAVENOUS
Status: DISCONTINUED | OUTPATIENT
Start: 2022-07-14 | End: 2022-07-14

## 2022-07-14 RX ADMIN — LIDOCAINE HYDROCHLORIDE 100 MG: 20 INJECTION, SOLUTION INTRAVENOUS at 02:07

## 2022-07-14 RX ADMIN — PROPOFOL 40 MG: 10 INJECTION, EMULSION INTRAVENOUS at 02:07

## 2022-07-14 RX ADMIN — PROPOFOL 50 MG: 10 INJECTION, EMULSION INTRAVENOUS at 03:07

## 2022-07-14 RX ADMIN — SODIUM CHLORIDE: 9 INJECTION, SOLUTION INTRAVENOUS at 02:07

## 2022-07-14 RX ADMIN — PROPOFOL 30 MG: 10 INJECTION, EMULSION INTRAVENOUS at 03:07

## 2022-07-14 NOTE — TRANSFER OF CARE
"Anesthesia Transfer of Care Note    Patient: Fatmata Tolbert    Procedure(s) Performed: * EGD    Patient location: GI    Anesthesia Type: general    Transport from OR: Transported from OR on room air with adequate spontaneous ventilation. Continuous ECG monitoring in transport. Continuous SpO2 monitoring in transport    Post pain: adequate analgesia    Post assessment: no apparent anesthetic complications    Post vital signs: stable    Level of consciousness: sedated and responds to stimulation    Nausea/Vomiting: no nausea/vomiting    Complications: none    Transfer of care protocol was followedComments: Good SV continue, NAD, VSS, RTRN      Last vitals:   Visit Vitals  BP (!) 122/56 (BP Location: Left arm, Patient Position: Lying)   Pulse 80   Temp 36.6 °C (97.8 °F) (Oral)   Resp 17   Ht 5' 6" (1.676 m)   Wt 70.8 kg (156 lb)   SpO2 100%   Breastfeeding No   BMI 25.18 kg/m²     "

## 2022-07-14 NOTE — DISCHARGE INSTRUCTIONS
Procedure Date  7/14/22     Impression  Overall Impression: Mucosa of the esophagus is normal. A 4cm hiatus hernia is noted and gastric mucosa was minimally erythematous without ulcers; gastric biopsies were obtained. The duodenal bulb was inflamed and biopsies were obtained.     Recommendation  Await pathology results; avoid nsaids; ppi or H2RA daily; return to GI clinic for follow-up.    No driving today, no operating heavy machinery, no signing any legal documents until tomorrow.    Drink lots of fluids, resume regular diet.  Take your normal medications.     CLINIC FOLLOW-UP - September 7, 2022 @ 9:30 AM

## 2022-07-14 NOTE — ANESTHESIA PREPROCEDURE EVALUATION
07/14/2022  Fatmata Tolbert is a 77 y.o., female.  Current Outpatient Medications   Medication Sig    ACCU-CHEK GUIDE GLUCOSE METER Misc 1 strip by Misc.(Non-Drug; Combo Route) route once daily.    ACCU-CHEK SOFTCLIX LANCETS Misc 1 lancet by Misc.(Non-Drug; Combo Route) route once daily. Use to checks usgar one x day.    amLODIPine (NORVASC) 5 MG tablet Take 5 mg by mouth.    aspirin (ECOTRIN) 81 MG EC tablet Take 81 mg by mouth once daily.    blood sugar diagnostic Strp 1 each by Misc.(Non-Drug; Combo Route) route once daily. (Patient taking differently: 1 each by Misc.(Non-Drug; Combo Route) route once daily. FOR ACCUCHECK METER, use to check sugar 1 x day.)    cetirizine (ZYRTEC) 10 MG tablet Take 10 mg by mouth once daily.    cholecalciferol, vitamin D3, 125 mcg (5,000 unit) Tab Take 5,000 Units by mouth once daily.    famotidine (PEPCID) 20 MG tablet Take 1 tablet (20 mg total) by mouth 2 (two) times daily.    fluticasone propionate (FLONASE) 50 mcg/actuation nasal spray 1 spray (50 mcg total) by Each Nostril route once daily.    Lactobacillus rhamnosus GG (CULTURELLE) 10 billion cell capsule Take 1 capsule by mouth once daily.    lisinopriL (PRINIVIL,ZESTRIL) 40 MG tablet Take 40 mg by mouth.    SORAYA BIOTIN ORAL Take 3,000 mcg by mouth 2 (two) times a day.    metoprolol succinate (TOPROL-XL) 100 MG 24 hr tablet Take 100 mg by mouth once daily.    mirtazapine (REMERON) 7.5 MG Tab Take 1 tablet (7.5 mg total) by mouth every evening.    omega-3 fatty acids/fish oil (FISH OIL-OMEGA-3 FATTY ACIDS) 300-1,000 mg capsule Take by mouth once daily.    ondansetron (ZOFRAN) 4 MG tablet Take 1 tablet (4 mg total) by mouth every 6 (six) hours as needed for Nausea.    pravastatin (PRAVACHOL) 20 MG tablet Take 1 tablet (20 mg total) by mouth nightly.    SITagliptan-metformin (JANUMET XR) 50-1,000 mg  TM24 Take 2 tablets in the am po    soy isofla/blk cohosh/mag bark (ESTROVEN ORAL) Take 1 tablet by mouth once daily at 6am.     No current facility-administered medications for this encounter.     Active Ambulatory Problems     Diagnosis Date Noted    HTN (hypertension) 07/03/2018    Chest pain 10/01/2018    Systolic heart failure 10/01/2018    Displacement of urinary electronic stimulator device 07/25/2019    Dyspnea 11/02/2020    Fecal incontinence 10/03/2018    Unstable angina 11/02/2020    Diabetes mellitus, type 2     GERD (gastroesophageal reflux disease) 07/09/2021    Clostridium difficile diarrhea 09/09/2021    Gastroparesis 09/09/2021    Normocytic anemia 10/26/2021    Otalgia of right ear 10/26/2021    Hyperlipidemia     ANNA (obstructive sleep apnea)     Nausea 07/07/2022     Resolved Ambulatory Problems     Diagnosis Date Noted    Acute sinusitis 10/26/2021     Past Medical History:   Diagnosis Date    Anxiety     Cardiomyopathy     Diabetes, polyneuropathy     DJD (degenerative joint disease), multiple sites     Pseudophakia 02/09/2021     Past Surgical History:   Procedure Laterality Date    BREAST BIOPSY      HYSTERECTOMY           Pre-op Assessment    I have reviewed the Patient Summary Reports.     I have reviewed the Nursing Notes. I have reviewed the NPO Status.   I have reviewed the Medications.     Review of Systems  Anesthesia Hx:  No problems with previous Anesthesia  Denies Family Hx of Anesthesia complications.   Denies Personal Hx of Anesthesia complications.   Social:  Non-Smoker, No Alcohol Use    Hematology/Oncology:     Oncology Normal    -- Anemia:   EENT/Dental:EENT/Dental Normal   Cardiovascular:   Hypertension Dysrhythmias (patient reports having ablation done at [facility] in [place] Feb 2022)  Denies Angina. Cardiomyopathy    Pulmonary:   Denies Shortness of breath. Sleep Apnea, CPAP    Education provided regarding risk of obstructive sleep apnea      Renal/:  Renal/ Normal     Hepatic/GI:   GERD, well controlled Nausea    Musculoskeletal:   Arthritis     Neurological:   Neuromuscular Disease,    Endocrine:   Diabetes, type 2    Dermatological:  Skin Normal    Psych:   anxiety depression        Lab Results   Component Value Date    WBC 5.31 02/21/2022    HGB 11.2 (L) 06/07/2022    HCT 33.7 (L) 06/07/2022    MCV 88.5 02/21/2022     02/21/2022     Per office visit from Loretta Mayberry NP  Echo December 2021 noted LVEF 40-45%        Physical Exam  General: Well nourished, Cooperative, Alert and Oriented    Airway:  Mallampati: II   Mouth Opening: Normal  TM Distance: Normal  Tongue: Normal  Neck ROM: Normal ROM    Dental:  Intact    Chest/Lungs:  Normal Respiratory Rate        Anesthesia Plan  Type of Anesthesia, risks & benefits discussed:    Anesthesia Type: Gen Natural Airway  Intra-op Monitoring Plan: Standard ASA Monitors  Post Op Pain Control Plan: multimodal analgesia  Induction:  IV  Informed Consent: Informed consent signed with the Patient and all parties understand the risks and agree with anesthesia plan.  All questions answered. Patient consented to blood products? Yes  ASA Score: 3  Day of Surgery Review of History & Physical: H&P Update referred to the surgeon/provider.I have interviewed and examined the patient. I have reviewed the patient's H&P dated: There are no significant changes.     Ready For Surgery From Anesthesia Perspective.     .

## 2022-07-14 NOTE — H&P
[facility]  Gastroenterology  H&P    Patient Name: Fatmata Tolbert  MRN: [id number]  Admission Date: 7/14/2022  Code Status: Full Code    Attending Provider: Jimenez Olvera MD  Primary Care Physician: Yrn Kimball DO  Principal Problem:<principal problem not specified>    Subjective:     History of Present Illness: Pt c/o nausea and some epigastric pain; for egd.    Past Medical History:   Diagnosis Date    Anxiety     Cardiomyopathy     Diabetes mellitus, type 2     Diabetes, polyneuropathy     DJD (degenerative joint disease), multiple sites     GERD (gastroesophageal reflux disease)     HTN (hypertension)     Hyperlipidemia     ANNA (obstructive sleep apnea)     Pseudophakia 02/09/2021       Past Surgical History:   Procedure Laterality Date    BREAST BIOPSY      HYSTERECTOMY         Review of patient's allergies indicates:   Allergen Reactions    Augmentin [amoxicillin-pot clavulanate] Diarrhea    Clarithromycin Diarrhea     Family History     Problem Relation (Age of Onset)    Breast cancer Sister, Maternal Grandmother    Diabetes Mother    Heart disease Sister, Father    Hypertension Sister, Father    Kidney disease Brother    Prostate cancer Father    Stroke Mother, Brother, Sister, Brother        Tobacco Use    Smoking status: Never Smoker    Smokeless tobacco: Never Used   Substance and Sexual Activity    Alcohol use: Never    Drug use: Never    Sexual activity: Not Currently     Review of Systems   Respiratory: Negative.    Cardiovascular: Negative.    Gastrointestinal: Positive for abdominal distention and nausea.     Objective:     Vital Signs (Most Recent):  Temp: 98.6 °F (37 °C) (07/14/22 1405)  Pulse: 66 (07/14/22 1405)  Resp: 14 (07/14/22 1405)  BP: (!) 125/53 (07/14/22 1405)  SpO2: 100 % (07/14/22 1405) Vital Signs (24h Range):  Temp:  [98.6 °F (37 °C)] 98.6 °F (37 °C)  Pulse:  [66] 66  Resp:  [14] 14  SpO2:  [100 %] 100 %  BP: (125)/(53) 125/53     Weight: 70.8 kg  (156 lb) (07/14/22 1405)  Body mass index is 25.18 kg/m².    No intake or output data in the 24 hours ending 07/14/22 1459    Lines/Drains/Airways     Peripheral Intravenous Line  Duration                Peripheral IV - Single Lumen 07/14/22 1405 22 G Anterior;Right Hand <1 day                Physical Exam  Vitals reviewed.   Constitutional:       General: She is not in acute distress.     Appearance: Normal appearance. She is well-developed. She is not ill-appearing.   HENT:      Head: Normocephalic and atraumatic.      Nose: Nose normal.   Eyes:      Pupils: Pupils are equal, round, and reactive to light.   Cardiovascular:      Rate and Rhythm: Normal rate and regular rhythm.   Pulmonary:      Effort: Pulmonary effort is normal.      Breath sounds: Normal breath sounds. No wheezing.   Abdominal:      General: Abdomen is flat. Bowel sounds are normal. There is no distension.      Palpations: Abdomen is soft.      Tenderness: There is no abdominal tenderness. There is no guarding.   Skin:     General: Skin is warm and dry.      Coloration: Skin is not jaundiced.   Neurological:      Mental Status: She is alert.   Psychiatric:         Attention and Perception: Attention normal.         Mood and Affect: Affect normal.         Speech: Speech normal.         Behavior: Behavior is cooperative.      Comments: Pt was calm while speaking.         Significant Labs:  CBC: No results for input(s): WBC, HGB, HCT, PLT in the last 48 hours.  CMP: No results for input(s): GLU, CALCIUM, ALBUMIN, PROT, NA, K, CO2, CL, BUN, CREATININE, ALKPHOS, ALT, AST, BILITOT in the last 48 hours.    Significant Imaging:  Imaging results within the past 24 hours have been reviewed.    Assessment/Plan:     There are no hospital problems to display for this patient.        Imp: nausea, epigastric pain  Plan: egd    Jimenez Olvera MD  Gastroenterology  [facility]

## 2022-07-14 NOTE — ANESTHESIA POSTPROCEDURE EVALUATION
Anesthesia Post Evaluation    Patient: Fatmata Tolbert    Procedure(s) Performed: * EGD    Final Anesthesia Type: general      Patient location during evaluation: GI PACU  Patient participation: Yes- Able to Participate  Level of consciousness: awake and alert  Post-procedure vital signs: reviewed and stable  Pain management: adequate  Airway patency: patent    PONV status at discharge: No PONV  Anesthetic complications: no      Cardiovascular status: blood pressure returned to baseline and hemodynamically stable  Respiratory status: spontaneous ventilation  Hydration status: euvolemic  Follow-up not needed.  Comments: Pt voices appreciation for care          Vitals Value Taken Time   /57 07/14/22 1516   Temp 98 F 07/14/22 1517   Pulse 67 07/14/22 1516   Resp 13 07/14/22 1516   SpO2 100 % 07/14/22 1516   Vitals shown include unvalidated device data.      No case tracking events are documented in the log.      Pain/Caitlin Score: No data recorded

## 2022-07-15 LAB
ESTROGEN SERPL-MCNC: NORMAL PG/ML
INSULIN SERPL-ACNC: NORMAL U[IU]/ML
LAB AP GROSS DESCRIPTION: NORMAL
LAB AP LABORATORY NOTES: NORMAL
T3RU NFR SERPL: NORMAL %

## 2022-07-18 ENCOUNTER — TELEPHONE (OUTPATIENT)
Dept: GASTROENTEROLOGY | Facility: CLINIC | Age: 77
End: 2022-07-18
Payer: MEDICARE

## 2022-07-18 NOTE — TELEPHONE ENCOUNTER
Called patient to discuss results and verbalized understanding.      ----- Message from Jimenez Olvera MD sent at 7/15/2022  4:47 PM CDT -----  EGD bx shows gastritis without H.pylori.

## 2022-07-19 ENCOUNTER — OFFICE VISIT (OUTPATIENT)
Dept: FAMILY MEDICINE | Facility: CLINIC | Age: 77
End: 2022-07-19
Payer: MEDICARE

## 2022-07-19 VITALS
OXYGEN SATURATION: 95 % | WEIGHT: 157 LBS | DIASTOLIC BLOOD PRESSURE: 72 MMHG | HEART RATE: 81 BPM | SYSTOLIC BLOOD PRESSURE: 130 MMHG | RESPIRATION RATE: 20 BRPM | BODY MASS INDEX: 25.23 KG/M2 | HEIGHT: 66 IN | TEMPERATURE: 98 F

## 2022-07-19 DIAGNOSIS — F32.A DEPRESSION, UNSPECIFIED DEPRESSION TYPE: ICD-10-CM

## 2022-07-19 PROCEDURE — 1159F MED LIST DOCD IN RCRD: CPT | Mod: ,,, | Performed by: FAMILY MEDICINE

## 2022-07-19 PROCEDURE — 1160F RVW MEDS BY RX/DR IN RCRD: CPT | Mod: ,,, | Performed by: FAMILY MEDICINE

## 2022-07-19 PROCEDURE — 3075F PR MOST RECENT SYSTOLIC BLOOD PRESS GE 130-139MM HG: ICD-10-PCS | Mod: ,,, | Performed by: FAMILY MEDICINE

## 2022-07-19 PROCEDURE — 3078F PR MOST RECENT DIASTOLIC BLOOD PRESSURE < 80 MM HG: ICD-10-PCS | Mod: ,,, | Performed by: FAMILY MEDICINE

## 2022-07-19 PROCEDURE — 99213 PR OFFICE/OUTPT VISIT, EST, LEVL III, 20-29 MIN: ICD-10-PCS | Mod: ,,, | Performed by: FAMILY MEDICINE

## 2022-07-19 PROCEDURE — 3078F DIAST BP <80 MM HG: CPT | Mod: ,,, | Performed by: FAMILY MEDICINE

## 2022-07-19 PROCEDURE — 1160F PR REVIEW ALL MEDS BY PRESCRIBER/CLIN PHARMACIST DOCUMENTED: ICD-10-PCS | Mod: ,,, | Performed by: FAMILY MEDICINE

## 2022-07-19 PROCEDURE — 3075F SYST BP GE 130 - 139MM HG: CPT | Mod: ,,, | Performed by: FAMILY MEDICINE

## 2022-07-19 PROCEDURE — 99213 OFFICE O/P EST LOW 20 MIN: CPT | Mod: ,,, | Performed by: FAMILY MEDICINE

## 2022-07-19 PROCEDURE — 1159F PR MEDICATION LIST DOCUMENTED IN MEDICAL RECORD: ICD-10-PCS | Mod: ,,, | Performed by: FAMILY MEDICINE

## 2022-07-19 RX ORDER — MIRTAZAPINE 7.5 MG/1
7.5 TABLET, FILM COATED ORAL NIGHTLY
Qty: 90 TABLET | Refills: 3 | Status: SHIPPED | OUTPATIENT
Start: 2022-07-19 | End: 2023-04-04 | Stop reason: SDUPTHER

## 2022-07-19 NOTE — PROGRESS NOTES
[facility]  Chief Complaint      Chief Complaint   Patient presents with    Follow-up    Depression       History of Present Illness      Fatmata Tolbert is a 77 y.o. female with chronic conditions of  HTN, ANNA, HFrEF, Normocytic Anemia, GERD, DM2 who presents today for follow-up for depression/poor sleep/anxiety. Patient endorses vast improvement with sleep, and some with anxiety; appetite has also improved some, but still having some issues with depression. Discussed possibly increasing dose, or switching medications altogether. Patient states she has only been taking medication 3-4 wks, and would like to stay on current med/dose for a little longer to allow time for it to take effect. Patient also seen by GI with recent EGD showing some gastriits; recommendations to continue w/ H2 blocker,and avoid NSAIDs discussed. Pt to follow-up with GI in Sept. Chronic conditions otherwise stable on current med regimen. No other acute sx, or events reported.     Past Medical History:  Past Medical History:   Diagnosis Date    Acute superficial gastritis without hemorrhage 7/14/2022    Anxiety     Cardiomyopathy     Diabetes mellitus, type 2     Diabetes, polyneuropathy     DJD (degenerative joint disease), multiple sites     Duodenitis 7/14/2022    GERD (gastroesophageal reflux disease)     HH (hiatus hernia) 7/14/2022    HTN (hypertension)     Hyperlipidemia     ANNA (obstructive sleep apnea)     Pseudophakia 02/09/2021       Past Surgical History:   has a past surgical history that includes Breast biopsy and Hysterectomy.    Social History:  Social History     Tobacco Use    Smoking status: Never Smoker    Smokeless tobacco: Never Used   Substance Use Topics    Alcohol use: Never    Drug use: Never       I personally reviewed all past medical, surgical, and social.     Review of Systems   Constitutional: Negative for chills, fatigue and fever.   HENT: Negative for ear pain.    Eyes:  Negative for pain and visual disturbance.   Respiratory: Negative for chest tightness and shortness of breath.    Cardiovascular: Negative for chest pain and leg swelling.   Gastrointestinal: Negative for abdominal pain, nausea and vomiting.   Genitourinary: Negative for difficulty urinating.   Musculoskeletal: Negative for gait problem and myalgias.   Skin: Negative for rash.   Neurological: Negative for dizziness and light-headedness.   Hematological: Does not bruise/bleed easily.   Psychiatric/Behavioral: Positive for dysphoric mood. Negative for sleep disturbance. The patient is not nervous/anxious.         Medications:  Outpatient Encounter Medications as of 7/19/2022   Medication Sig Dispense Refill    ACCU-CHEK GUIDE GLUCOSE METER Misc 1 strip by Misc.(Non-Drug; Combo Route) route once daily.      ACCU-CHEK SOFTCLIX LANCETS Misc 1 lancet by Misc.(Non-Drug; Combo Route) route once daily. Use to checks usgar one x day.      amLODIPine (NORVASC) 5 MG tablet Take 5 mg by mouth.      aspirin (ECOTRIN) 81 MG EC tablet Take 81 mg by mouth once daily.      blood sugar diagnostic Strp 1 each by Misc.(Non-Drug; Combo Route) route once daily. (Patient taking differently: 1 each by Misc.(Non-Drug; Combo Route) route once daily. FOR ACCUCHECK METER, use to check sugar 1 x day.) 100 strip 3    cetirizine (ZYRTEC) 10 MG tablet Take 10 mg by mouth once daily.      cholecalciferol, vitamin D3, 125 mcg (5,000 unit) Tab Take 5,000 Units by mouth once daily.      famotidine (PEPCID) 20 MG tablet Take 1 tablet (20 mg total) by mouth 2 (two) times daily. 180 tablet 3    fluticasone propionate (FLONASE) 50 mcg/actuation nasal spray 1 spray (50 mcg total) by Each Nostril route once daily. 9.9 mL 3    Lactobacillus rhamnosus GG (CULTURELLE) 10 billion cell capsule Take 1 capsule by mouth once daily.      SORAYA BIOTIN ORAL Take 3,000 mcg by mouth 2 (two) times a day.      metoprolol succinate (TOPROL-XL) 100 MG 24 hr tablet  "Take 100 mg by mouth once daily.      omega-3 fatty acids/fish oil (FISH OIL-OMEGA-3 FATTY ACIDS) 300-1,000 mg capsule Take by mouth once daily.      ondansetron (ZOFRAN) 4 MG tablet Take 1 tablet (4 mg total) by mouth every 6 (six) hours as needed for Nausea. 30 tablet 2    pravastatin (PRAVACHOL) 20 MG tablet Take 1 tablet (20 mg total) by mouth nightly. 90 tablet 1    SITagliptan-metformin (JANUMET XR) 50-1,000 mg TM24 Take 2 tablets in the am po 180 tablet 3    soy isofla/blk cohosh/mag bark (ESTROVEN ORAL) Take 1 tablet by mouth once daily at 6am.      [DISCONTINUED] mirtazapine (REMERON) 7.5 MG Tab Take 1 tablet (7.5 mg total) by mouth every evening. 30 tablet 2    lisinopriL (PRINIVIL,ZESTRIL) 40 MG tablet Take 40 mg by mouth.      mirtazapine (REMERON) 7.5 MG Tab Take 1 tablet (7.5 mg total) by mouth every evening. 90 tablet 3    [DISCONTINUED] sodium chloride 0.9% flush 10 mL        No facility-administered encounter medications on file as of 7/19/2022.       Allergies:  Review of patient's allergies indicates:   Allergen Reactions    Augmentin [amoxicillin-pot clavulanate] Diarrhea    Clarithromycin Diarrhea       Health Maintenance:  Immunization History   Administered Date(s) Administered    COVID-19, MRNA, LN-S, PF (MODERNA FULL 0.5 ML DOSE) 02/12/2021, 03/16/2021, 11/05/2021, 06/10/2022    Influenza - Quadrivalent - High Dose - PF (65 years and older) 01/26/2022    Influenza - Trivalent (ADULT) 01/28/2016      Health Maintenance   Topic Date Due    Hepatitis C Screening  Never done    TETANUS VACCINE  Never done    DEXA Scan  Never done    Eye Exam  02/09/2022    Hemoglobin A1c  07/26/2022    Lipid Panel  10/26/2022    Foot Exam  03/21/2023        Physical Exam      Vital Signs  Temp: 98.2 °F (36.8 °C)  Temp src: Oral  Pulse: 81  Resp: 20  SpO2: 95 %  BP: 130/72  BP Location: Left arm  Patient Position: Sitting  Height and Weight  Height: 5' 6" (167.6 cm)  Weight: 71.2 kg (157 " lb)  BSA (Calculated - sq m): 1.82 sq meters  BMI (Calculated): 25.4  Weight in (lb) to have BMI = 25: 154.6]    Physical Exam  Constitutional:       Appearance: Normal appearance.   HENT:      Head: Normocephalic and atraumatic.      Nose: Nose normal.      Mouth/Throat:      Mouth: Mucous membranes are moist.      Pharynx: Oropharynx is clear.   Eyes:      Extraocular Movements: Extraocular movements intact.      Conjunctiva/sclera: Conjunctivae normal.      Pupils: Pupils are equal, round, and reactive to light.   Cardiovascular:      Rate and Rhythm: Normal rate and regular rhythm.      Pulses: Normal pulses.           Radial pulses are 2+ on the right side and 2+ on the left side.      Heart sounds: Normal heart sounds.   Pulmonary:      Effort: Pulmonary effort is normal.      Breath sounds: Normal breath sounds.   Abdominal:      Palpations: Abdomen is soft.      Tenderness: There is no abdominal tenderness.   Musculoskeletal:         General: Normal range of motion.      Right lower leg: No edema.      Left lower leg: No edema.   Skin:     General: Skin is warm and dry.      Findings: No rash.   Neurological:      General: No focal deficit present.      Mental Status: She is alert. Mental status is at baseline.   Psychiatric:         Mood and Affect: Mood normal.      Comments: Flat affect          Laboratory:  CBC:  Recent Labs   Lab 06/14/21  1128 07/02/21  1138 02/21/22  0832 06/07/22  0928   WBC 4.64 4.89 5.31  --    RBC 3.92 L 4.09 L 4.00 L  --    Hemoglobin 10.9 L 11.2 L 11.5 L 11.2 L   Hematocrit 34.3 L 35.8 L 35.4 L 33.7 L   Platelet Count 197 218 186  --    MCV 87.5 87.5 88.5  --    MCH 27.8 27.4 28.8  --    MCHC 31.8 L 31.3 L 32.5  --      CMP:  Recent Labs   Lab 05/30/21  1041 06/14/21  1128 07/02/21  1138 02/21/22  0832 06/07/22  0928   Glucose 90 79 96 218 H  --    Calcium 9.1 8.8 9.2 9.5  --    Albumin 3.6 3.7 3.8  --   --    Total Protein 7.1 7.0 7.0  --  6.9   Sodium 143 141 143 139  --     Potassium 4.5 4.6 4.7 4.4  --    CO2 29 30 33 H 30  --    Chloride 111 H 106 108 H 105  --    BUN 20 H 14 23 H 22 H  --    Alk Phos 53 L 49 L 49 L  --   --    ALT 17 22 21  --   --    AST 13 L 15 14 L  --   --    Bilirubin, Total 0.2 0.4 0.4  --   --      LIPIDS:  Recent Labs   Lab 10/26/21  0920   HDL Cholesterol 71 H   Cholesterol 138   Triglycerides 74   LDL Calculated 52   Cholesterol/HDL Ratio (Risk Factor) 1.9   Non-HDL 67     TSH:      A1C:  Recent Labs   Lab 06/16/21  1333 10/26/21  0920 01/26/22  0905   Hemoglobin A1C 6.6 6.6 6.4       Assessment/Plan     Fatmata Tolbert is a 77 y.o.female with:     1. Depression, unspecified depression type  - mirtazapine (REMERON) 7.5 MG Tab; Take 1 tablet (7.5 mg total) by mouth every evening.  Dispense: 90 tablet; Refill: 3       Total time spent face-to-face and non-face-to-face coordinating care for this encounter was: 35 min    Chronic conditions status updated as per HPI.  Other than changes above, cont current medications and maintain follow up with specialists.  Return to clinic in 3 months.    Yrn Kimball,    [facility]

## 2022-07-26 ENCOUNTER — TELEPHONE (OUTPATIENT)
Dept: GASTROENTEROLOGY | Facility: CLINIC | Age: 77
End: 2022-07-26
Payer: MEDICARE

## 2022-07-26 ENCOUNTER — HOSPITAL ENCOUNTER (OUTPATIENT)
Dept: RADIOLOGY | Facility: HOSPITAL | Age: 77
Discharge: HOME | End: 2022-07-26
Attending: NURSE PRACTITIONER
Payer: MEDICARE

## 2022-07-26 DIAGNOSIS — R11.0 NAUSEA: ICD-10-CM

## 2022-07-26 PROCEDURE — 78227 HEPATOBIL SYST IMAGE W/DRUG: CPT | Mod: 26,,, | Performed by: RADIOLOGY

## 2022-07-26 PROCEDURE — 78227 NM HEPATOBILIARY(HIDA) WITH PHARM AND EF: ICD-10-PCS | Mod: 26,,, | Performed by: RADIOLOGY

## 2022-07-26 PROCEDURE — A9537 TC99M MEBROFENIN: HCPCS

## 2022-07-26 NOTE — TELEPHONE ENCOUNTER
Results called to patient. Verbalized understanding.        ----- Message from MELI Yeh sent at 7/26/2022  1:02 PM CDT -----  Gallbladder function is normal.

## 2022-08-30 ENCOUNTER — HOSPITAL ENCOUNTER (EMERGENCY)
Facility: HOSPITAL | Age: 77
Discharge: HOME | End: 2022-08-30
Attending: EMERGENCY MEDICINE
Payer: MEDICARE

## 2022-08-30 VITALS
OXYGEN SATURATION: 98 % | HEIGHT: 66 IN | HEART RATE: 64 BPM | SYSTOLIC BLOOD PRESSURE: 130 MMHG | TEMPERATURE: 98 F | RESPIRATION RATE: 18 BRPM | BODY MASS INDEX: 24.91 KG/M2 | WEIGHT: 155 LBS | DIASTOLIC BLOOD PRESSURE: 32 MMHG

## 2022-08-30 DIAGNOSIS — R51.9 NONINTRACTABLE HEADACHE, UNSPECIFIED CHRONICITY PATTERN, UNSPECIFIED HEADACHE TYPE: Primary | ICD-10-CM

## 2022-08-30 DIAGNOSIS — R20.2 PARESTHESIA: ICD-10-CM

## 2022-08-30 PROCEDURE — 63600175 PHARM REV CODE 636 W HCPCS: Performed by: EMERGENCY MEDICINE

## 2022-08-30 PROCEDURE — 96372 THER/PROPH/DIAG INJ SC/IM: CPT

## 2022-08-30 PROCEDURE — 99284 EMERGENCY DEPT VISIT MOD MDM: CPT | Mod: ,,, | Performed by: EMERGENCY MEDICINE

## 2022-08-30 PROCEDURE — 99285 EMERGENCY DEPT VISIT HI MDM: CPT | Mod: 25

## 2022-08-30 PROCEDURE — 99284 PR EMERGENCY DEPT VISIT,LEVEL IV: ICD-10-PCS | Mod: ,,, | Performed by: EMERGENCY MEDICINE

## 2022-08-30 RX ORDER — ORPHENADRINE CITRATE 30 MG/ML
30 INJECTION INTRAMUSCULAR; INTRAVENOUS
Status: COMPLETED | OUTPATIENT
Start: 2022-08-30 | End: 2022-08-30

## 2022-08-30 RX ORDER — KETOROLAC TROMETHAMINE 30 MG/ML
30 INJECTION, SOLUTION INTRAMUSCULAR; INTRAVENOUS
Status: COMPLETED | OUTPATIENT
Start: 2022-08-30 | End: 2022-08-30

## 2022-08-30 RX ORDER — BUTALBITAL, ACETAMINOPHEN, CAFFEINE AND CODEINE PHOSPHATE 50; 325; 40; 30 MG/1; MG/1; MG/1; MG/1
CAPSULE ORAL
COMMUNITY
Start: 2022-08-29 | End: 2022-09-27 | Stop reason: ALTCHOICE

## 2022-08-30 RX ORDER — VENLAFAXINE HYDROCHLORIDE 37.5 MG/1
37.5 CAPSULE, EXTENDED RELEASE ORAL DAILY
COMMUNITY
Start: 2022-08-23 | End: 2023-03-22

## 2022-08-30 RX ORDER — METHOCARBAMOL 500 MG/1
TABLET, FILM COATED ORAL 3 TIMES DAILY
Qty: 30 TABLET | Refills: 0 | Status: SHIPPED | OUTPATIENT
Start: 2022-08-30 | End: 2022-09-04

## 2022-08-30 RX ADMIN — KETOROLAC TROMETHAMINE 30 MG: 60 INJECTION, SOLUTION INTRAMUSCULAR at 11:08

## 2022-08-30 RX ADMIN — ORPHENADRINE CITRATE 30 MG: 30 INJECTION INTRAMUSCULAR; INTRAVENOUS at 11:08

## 2022-08-30 NOTE — ED TRIAGE NOTES
Pt presents to ED with c/o headache for 3 weeks, pt reports left sided facial numbness when she woke up this AM. Pt denies numbness or weakness anywhere else, pt denies any other symptoms.

## 2022-08-30 NOTE — DISCHARGE INSTRUCTIONS
Use warm heat every few hours as discussed.  Keep using ibuprofen or Aleve as needed.  Use prescription Robaxin as needed.  Use caution with Robaxin as it may cause some sedation.  Keep taking aspirin 81 mg per day.    It is extremely important return immediately the develop new symptoms such as facial droop weakness numbness trouble speaking or forming words.

## 2022-08-30 NOTE — ED PROVIDER NOTES
Encounter Date: 8/30/2022       History     Chief Complaint   Patient presents with    Headache     Patient complains of 3 week history of headache.  Headache is in the occipital and right trapezius muscle area of the side of the neck.  Headache improves with hot showers.  No associated nausea dysarthria aphasia or weakness or decreased sensation of the arms or legs.  No prior strokes.  Patient did have some paresthesias left side of the face but no facial droop or any other symptoms.  Patient has taken Aleve but no other medications.  No other associated    Review of patient's allergies indicates:   Allergen Reactions    Augmentin [amoxicillin-pot clavulanate] Diarrhea    Clarithromycin Diarrhea     Past Medical History:   Diagnosis Date    Acute superficial gastritis without hemorrhage 7/14/2022    Anxiety     Cardiomyopathy     Diabetes mellitus, type 2     Diabetes, polyneuropathy     DJD (degenerative joint disease), multiple sites     Duodenitis 7/14/2022    GERD (gastroesophageal reflux disease)     HH (hiatus hernia) 7/14/2022    HTN (hypertension)     Hyperlipidemia     ANNA (obstructive sleep apnea)     Pseudophakia 02/09/2021     Past Surgical History:   Procedure Laterality Date    BREAST BIOPSY      HYSTERECTOMY       Family History   Problem Relation Age of Onset    Breast cancer Sister     Hypertension Sister     Heart disease Sister     Diabetes Mother     Stroke Mother     Heart disease Father     Hypertension Father     Prostate cancer Father     Stroke Brother     Breast cancer Maternal Grandmother     Stroke Sister     Stroke Brother     Kidney disease Brother      Social History     Tobacco Use    Smoking status: Never    Smokeless tobacco: Never   Substance Use Topics    Alcohol use: Never    Drug use: Never     Review of Systems   Constitutional:  Negative for fever.   HENT:  Negative for sore throat.    Respiratory:  Negative for shortness of breath.    Cardiovascular:  Negative for chest  pain.   Gastrointestinal:  Negative for nausea.   Genitourinary:  Negative for dysuria.   Musculoskeletal:  Negative for back pain.   Skin:  Negative for rash.   Neurological:  Positive for numbness. Negative for dizziness, facial asymmetry, speech difficulty and weakness.   Hematological:  Does not bruise/bleed easily.     Physical Exam     Initial Vitals [08/30/22 0934]   BP Pulse Resp Temp SpO2   (!) 130/32 64 18 98.2 °F (36.8 °C) 98 %      MAP       --         Physical Exam    Nursing note and vitals reviewed.  Constitutional: She appears well-developed and well-nourished.   HENT:   Head: Normocephalic and atraumatic.   Patient has tenderness of the right trapezius muscle area in the occipital scalp.  No sores or lesions of the scalp appreciated.  No bony tenderness.  Patient has full range of motion of the neck.   Eyes: EOM are normal. Pupils are equal, round, and reactive to light.   Neck: Neck supple. No thyromegaly present.   Normal range of motion.  Cardiovascular:  Normal rate, regular rhythm, normal heart sounds and intact distal pulses.           No murmur heard.  Pulmonary/Chest: Breath sounds normal. No respiratory distress. She has no wheezes.   Abdominal: Abdomen is soft. Bowel sounds are normal. She exhibits no distension. There is no abdominal tenderness.   Musculoskeletal:         General: No tenderness or edema. Normal range of motion.      Cervical back: Normal range of motion and neck supple.     Lymphadenopathy:     She has no cervical adenopathy.   Neurological: She is alert and oriented to person, place, and time. She has normal strength. No cranial nerve deficit or sensory deficit.   Score is 0.  Cranial nerves 2-12 is intact.   Skin: Skin is warm and dry. No rash noted.   Psychiatric: She has a normal mood and affect.       Medical Screening Exam   See Full Note    ED Course   Procedures  Labs Reviewed - No data to display       Imaging Results              CT Head Without Contrast (Final  result)  Result time 08/30/22 09:58:26      Final result by Valerio Jennings II, MD (08/30/22 09:58:26)                   Impression:      No evidence of abnormality demonstrated.      Electronically signed by: Valerio Jennings  Date:    08/30/2022  Time:    09:58               Narrative:    EXAMINATION:  CT HEAD WITHOUT CONTRAST    CLINICAL HISTORY:  headache, facial numbness;    TECHNIQUE:  Axial CT imaging of the brain is performed without contrast with 3 mm increments.    CT dose reduction technique used - Dose Rite and tube current modulation.    COMPARISON:  None available    FINDINGS:  No evidence of hemorrhage, mass, mass effect, midline shift or acute infarct seen.  The brain parenchyma attenuation and differentiation appears within normal limits. The ventricles and cisterns are normal in caliber.  No cranial or skull base abnormality is identified.                                       Medications   ketorolac injection 30 mg (has no administration in time range)   orphenadrine injection 30 mg (has no administration in time range)                 ED Course as of 08/30/22 1114   Tue Aug 30, 2022   1110 Patient most likely tension headache with some reproducible pain to the trapezius and the os of the foot.  Will treat with Norflex and Toradol in the ER and will prescribe Robaxin have her use warm heat and ibuprofen.  Patient will be taking aspirin 81 mg per day.  NIH stroke scale score is 0.  She did have some paresthesias left side of the face but no deficit.  These paresthesias started this morning at approximately 5:00 a.m..  Talk with her daughter about the need to come back immediately if symptoms worsen or new symptoms develop.  Will have patient continue taking aspirin follow-up with Neurology. [PK]      ED Course User Index  [PK] Marcel Woodson MD          Clinical Impression:   Final diagnoses:  [R51.9] Nonintractable headache, unspecified chronicity pattern, unspecified headache type  (Primary)  [R20.2] Paresthesia             Marcel Woodson MD  08/30/22 1118

## 2022-09-05 ENCOUNTER — HOSPITAL ENCOUNTER (EMERGENCY)
Facility: HOSPITAL | Age: 77
Discharge: HOME | End: 2022-09-05
Attending: FAMILY MEDICINE
Payer: MEDICARE

## 2022-09-05 VITALS
SYSTOLIC BLOOD PRESSURE: 122 MMHG | TEMPERATURE: 98 F | BODY MASS INDEX: 25.55 KG/M2 | OXYGEN SATURATION: 98 % | HEART RATE: 78 BPM | RESPIRATION RATE: 18 BRPM | HEIGHT: 66 IN | DIASTOLIC BLOOD PRESSURE: 49 MMHG | WEIGHT: 159 LBS

## 2022-09-05 DIAGNOSIS — G44.209 TENSION HEADACHE: Primary | ICD-10-CM

## 2022-09-05 PROCEDURE — 99284 EMERGENCY DEPT VISIT MOD MDM: CPT

## 2022-09-05 PROCEDURE — 99283 PR EMERGENCY DEPT VISIT,LEVEL III: ICD-10-PCS | Mod: ,,, | Performed by: FAMILY MEDICINE

## 2022-09-05 PROCEDURE — 99283 EMERGENCY DEPT VISIT LOW MDM: CPT | Mod: ,,, | Performed by: FAMILY MEDICINE

## 2022-09-05 PROCEDURE — 63600175 PHARM REV CODE 636 W HCPCS: Performed by: FAMILY MEDICINE

## 2022-09-05 PROCEDURE — 96372 THER/PROPH/DIAG INJ SC/IM: CPT

## 2022-09-05 RX ORDER — PREDNISONE 20 MG/1
40 TABLET ORAL DAILY
Qty: 6 TABLET | Refills: 0 | Status: SHIPPED | OUTPATIENT
Start: 2022-09-05 | End: 2022-09-08

## 2022-09-05 RX ORDER — DEXAMETHASONE SODIUM PHOSPHATE 4 MG/ML
4 INJECTION, SOLUTION INTRA-ARTICULAR; INTRALESIONAL; INTRAMUSCULAR; INTRAVENOUS; SOFT TISSUE
Status: COMPLETED | OUTPATIENT
Start: 2022-09-05 | End: 2022-09-05

## 2022-09-05 RX ADMIN — DEXAMETHASONE SODIUM PHOSPHATE 4 MG: 4 INJECTION, SOLUTION INTRA-ARTICULAR; INTRALESIONAL; INTRAMUSCULAR; INTRAVENOUS; SOFT TISSUE at 09:09

## 2022-09-05 NOTE — DISCHARGE INSTRUCTIONS
Monitor your blood sugars because the steroids will increase them. Avoid extra sugar in the form of drinks and sweets. You can follow up with [facility] in the next 1-2 days for OMT (Osteopathic Manipulation Therapy). They are located at [address] [facility] [place] [address]. Phone number is [phone].

## 2022-09-05 NOTE — ED TRIAGE NOTES
Pt in with CC of headaches for 1 month  pt was seen here last Tuesday pt has an MRI scheduled Friday and a neurology appointment on the 27th of this month.

## 2022-09-05 NOTE — ED PROVIDER NOTES
Encounter Date: 9/5/2022       History     Chief Complaint   Patient presents with    Headache     Patient is a 77-year-old female, who presents emergency department with a 1 month history of right-sided occipital headache.  Patient was seen here on 08/30/2022 for the same issue.  She was treated with NSAIDs and muscle relaxers, which I provided her no relief.  She has an MRI scheduled on Friday of this week, and a follow-up with Neurology on 09/27/2022.  She states her pain is a throbbing in her right occipital region.  It is nonradiating in nature.  It is not affected by light, sound, or movement of her head.  Pain is better with lying down, and worse when she sits up.  She denies any other symptoms.    Review of patient's allergies indicates:   Allergen Reactions    Augmentin [amoxicillin-pot clavulanate] Diarrhea    Clarithromycin Diarrhea     Past Medical History:   Diagnosis Date    Acute superficial gastritis without hemorrhage 7/14/2022    Anxiety     Cardiomyopathy     Diabetes mellitus, type 2     Diabetes, polyneuropathy     DJD (degenerative joint disease), multiple sites     Duodenitis 7/14/2022    GERD (gastroesophageal reflux disease)     HH (hiatus hernia) 7/14/2022    HTN (hypertension)     Hyperlipidemia     ANNA (obstructive sleep apnea)     Pseudophakia 02/09/2021     Past Surgical History:   Procedure Laterality Date    BREAST BIOPSY      HYSTERECTOMY       Family History   Problem Relation Age of Onset    Breast cancer Sister     Hypertension Sister     Heart disease Sister     Diabetes Mother     Stroke Mother     Heart disease Father     Hypertension Father     Prostate cancer Father     Stroke Brother     Breast cancer Maternal Grandmother     Stroke Sister     Stroke Brother     Kidney disease Brother      Social History     Tobacco Use    Smoking status: Never    Smokeless tobacco: Never   Substance Use Topics    Alcohol use: Never    Drug use: Never     Review of Systems    Gastrointestinal:  Negative for nausea and vomiting.   Neurological:  Positive for headaches. Negative for light-headedness and numbness.   All other systems reviewed and are negative.    Physical Exam     Initial Vitals [09/05/22 0842]   BP Pulse Resp Temp SpO2   (!) 122/49 78 18 97.9 °F (36.6 °C) 98 %      MAP       --         Physical Exam    Vitals reviewed.  Constitutional: She appears well-developed.   HENT:   Head: Normocephalic.   Eyes: Pupils are equal, round, and reactive to light.   Neck: Neck supple.   Normal range of motion.  Musculoskeletal:      Cervical back: Normal range of motion and neck supple.      Comments: Point tenderness on palpation at the base of the occipital bone on the right.  She also has tenderness on palpation her right trapezius.  Range of motion is normal and does not elicit any pain.     Neurological: She is alert and oriented to person, place, and time.   NIH is 0.   Psychiatric: She has a normal mood and affect.       Medical Screening Exam   See Full Note    ED Course   Procedures  Labs Reviewed - No data to display       Imaging Results    None          Medications   dexamethasone injection 4 mg (4 mg Intramuscular Given 9/5/22 0940)     Medical Decision Making:   ED Management:  Patient's pain appears to be secondary a tension headache.  She has tried multiple therapies with no improvement.  Will try short course of steroids to improve her pain.  I will also refer her to [facility] for OMT.                 Clinical Impression:   Final diagnoses:  [G44.209] Tension headache (Primary)        ED Disposition Condition    Discharge Stable          ED Prescriptions       Medication Sig Dispense Start Date End Date Auth. Provider    predniSONE (DELTASONE) 20 MG tablet Take 2 tablets (40 mg total) by mouth once daily. for 3 days 6 tablet 9/5/2022 9/8/2022 Nena Aguilar MD          Follow-up Information    None          Nena Aguilar MD  09/05/22 8811

## 2022-09-20 ENCOUNTER — OFFICE VISIT (OUTPATIENT)
Dept: FAMILY MEDICINE | Facility: CLINIC | Age: 77
End: 2022-09-20
Payer: MEDICARE

## 2022-09-20 VITALS
OXYGEN SATURATION: 98 % | HEART RATE: 76 BPM | SYSTOLIC BLOOD PRESSURE: 142 MMHG | BODY MASS INDEX: 24.88 KG/M2 | WEIGHT: 154.81 LBS | DIASTOLIC BLOOD PRESSURE: 82 MMHG | TEMPERATURE: 97 F | RESPIRATION RATE: 18 BRPM | HEIGHT: 66 IN

## 2022-09-20 DIAGNOSIS — G89.29 CHRONIC INTRACTABLE HEADACHE, UNSPECIFIED HEADACHE TYPE: Primary | ICD-10-CM

## 2022-09-20 DIAGNOSIS — R51.9 CHRONIC INTRACTABLE HEADACHE, UNSPECIFIED HEADACHE TYPE: Primary | ICD-10-CM

## 2022-09-20 PROBLEM — I47.29 NSVT (NONSUSTAINED VENTRICULAR TACHYCARDIA): Status: ACTIVE | Noted: 2022-02-23

## 2022-09-20 PROBLEM — I42.8 NON-ISCHEMIC CARDIOMYOPATHY: Status: ACTIVE | Noted: 2021-12-07

## 2022-09-20 PROBLEM — M79.671 PAIN IN RIGHT FOOT: Status: ACTIVE | Noted: 2022-09-20

## 2022-09-20 PROBLEM — I50.22 CHRONIC SYSTOLIC HEART FAILURE: Status: ACTIVE | Noted: 2018-07-03

## 2022-09-20 PROBLEM — Z99.89 DEPENDENCE ON OTHER ENABLING MACHINES AND DEVICES: Status: ACTIVE | Noted: 2022-09-20

## 2022-09-20 PROBLEM — I49.3 PVC'S (PREMATURE VENTRICULAR CONTRACTIONS): Status: ACTIVE | Noted: 2021-12-07

## 2022-09-20 PROBLEM — Z86.79 S/P RADIOFREQUENCY ABLATION OPERATION FOR ARRHYTHMIA: Status: ACTIVE | Noted: 2022-02-23

## 2022-09-20 PROBLEM — Z98.890 S/P RADIOFREQUENCY ABLATION OPERATION FOR ARRHYTHMIA: Status: ACTIVE | Noted: 2022-02-23

## 2022-09-20 LAB — ERYTHROCYTE [SEDIMENTATION RATE] IN BLOOD BY WESTERGREN METHOD: 1 MM/HR (ref 0–30)

## 2022-09-20 PROCEDURE — 85651 RBC SED RATE NONAUTOMATED: CPT | Mod: ,,, | Performed by: CLINICAL MEDICAL LABORATORY

## 2022-09-20 PROCEDURE — 3288F FALL RISK ASSESSMENT DOCD: CPT | Mod: ,,, | Performed by: NURSE PRACTITIONER

## 2022-09-20 PROCEDURE — 99212 OFFICE O/P EST SF 10 MIN: CPT | Mod: ,,, | Performed by: NURSE PRACTITIONER

## 2022-09-20 PROCEDURE — 3079F PR MOST RECENT DIASTOLIC BLOOD PRESSURE 80-89 MM HG: ICD-10-PCS | Mod: ,,, | Performed by: NURSE PRACTITIONER

## 2022-09-20 PROCEDURE — 1125F PR PAIN SEVERITY QUANTIFIED, PAIN PRESENT: ICD-10-PCS | Mod: ,,, | Performed by: NURSE PRACTITIONER

## 2022-09-20 PROCEDURE — 1125F AMNT PAIN NOTED PAIN PRSNT: CPT | Mod: ,,, | Performed by: NURSE PRACTITIONER

## 2022-09-20 PROCEDURE — 85651 SEDIMENTATION RATE, AUTOMATED: ICD-10-PCS | Mod: ,,, | Performed by: CLINICAL MEDICAL LABORATORY

## 2022-09-20 PROCEDURE — 3288F PR FALLS RISK ASSESSMENT DOCUMENTED: ICD-10-PCS | Mod: ,,, | Performed by: NURSE PRACTITIONER

## 2022-09-20 PROCEDURE — 1159F MED LIST DOCD IN RCRD: CPT | Mod: ,,, | Performed by: NURSE PRACTITIONER

## 2022-09-20 PROCEDURE — 3077F SYST BP >= 140 MM HG: CPT | Mod: ,,, | Performed by: NURSE PRACTITIONER

## 2022-09-20 PROCEDURE — 1160F RVW MEDS BY RX/DR IN RCRD: CPT | Mod: ,,, | Performed by: NURSE PRACTITIONER

## 2022-09-20 PROCEDURE — 1101F PR PT FALLS ASSESS DOC 0-1 FALLS W/OUT INJ PAST YR: ICD-10-PCS | Mod: ,,, | Performed by: NURSE PRACTITIONER

## 2022-09-20 PROCEDURE — 3079F DIAST BP 80-89 MM HG: CPT | Mod: ,,, | Performed by: NURSE PRACTITIONER

## 2022-09-20 PROCEDURE — 3077F PR MOST RECENT SYSTOLIC BLOOD PRESSURE >= 140 MM HG: ICD-10-PCS | Mod: ,,, | Performed by: NURSE PRACTITIONER

## 2022-09-20 PROCEDURE — 1101F PT FALLS ASSESS-DOCD LE1/YR: CPT | Mod: ,,, | Performed by: NURSE PRACTITIONER

## 2022-09-20 PROCEDURE — 1160F PR REVIEW ALL MEDS BY PRESCRIBER/CLIN PHARMACIST DOCUMENTED: ICD-10-PCS | Mod: ,,, | Performed by: NURSE PRACTITIONER

## 2022-09-20 PROCEDURE — 99212 PR OFFICE/OUTPT VISIT, EST, LEVL II, 10-19 MIN: ICD-10-PCS | Mod: ,,, | Performed by: NURSE PRACTITIONER

## 2022-09-20 PROCEDURE — 1159F PR MEDICATION LIST DOCUMENTED IN MEDICAL RECORD: ICD-10-PCS | Mod: ,,, | Performed by: NURSE PRACTITIONER

## 2022-09-20 RX ORDER — VANCOMYCIN HYDROCHLORIDE 125 MG/1
CAPSULE ORAL
COMMUNITY
End: 2022-09-20 | Stop reason: ALTCHOICE

## 2022-09-20 NOTE — PROGRESS NOTES
"[facility]  Chief Complaint      Chief Complaint   Patient presents with    [facility]     Here for initial visit with NP. Former patient of Dr. Shawn Kimball.     Headache     Patient reports severe head pain in the back of head on the right side of head for over a month. Patient has went to the ED twice. Patient has taken methocarbamol 500mg and Prednisone 20mg along with Aleve. Patient reports she did receive a little relief with Prednisone and Aleve. Patient reports pain never stops.       History of Present Illness      Fatmata Tolbert is a 77 y.o. female with chronic conditions of Hypertension, Allergic Rhinitis, Obstructive Sleep Apnea, Cardiomyopathy, GERD, Hyperlipidemia, Osteoarthritis Multiple Joints, and Polyneuropathy who presents today to [facility]. She c/o having a persistent headache for greater than 1 month that has resulted in two emergency room visits. Patient reports taking (Robaxin) Methocarbamol, Prednisone, and Aleve with "a little relief". Ms. Tolbert reports that the pain never goes away. She reports it started on her right side of her head. She also reported some relief with Fioricet with Codeine but refused refill from prescribing provider secondary to potential for addiction.   reviewed.     She is followed at [facility] for her cardiology needs.   She is followed at [facility] for her Sleep Apnea  She is followed by Samantha Hooks for her Diabetes       CT Head 08/30/2022  No evidence of abnormality demonstrated.    MRI 09/09/2022  No convincing imaging evidence of acute intracranial abnormality. Probable mild chronic microvascular changes.       Past Medical History:  Past Medical History:   Diagnosis Date    Acute superficial gastritis without hemorrhage 7/14/2022    Anxiety     Cardiomyopathy     Diabetes mellitus, type 2     Diabetes, polyneuropathy     DJD (degenerative joint disease), multiple sites     Duodenitis 7/14/2022    " "GERD (gastroesophageal reflux disease)     HH (hiatus hernia) 7/14/2022    HTN (hypertension)     Hyperlipidemia     ANNA (obstructive sleep apnea)     Pseudophakia 02/09/2021       Past Surgical History:   has a past surgical history that includes Breast biopsy and Hysterectomy.    Social History:  Social History     Tobacco Use    Smoking status: Never    Smokeless tobacco: Never   Substance Use Topics    Alcohol use: Never    Drug use: Never       I personally reviewed all past medical, surgical, and social.     Review of Systems   HENT:  Positive for rhinorrhea and tinnitus. Negative for congestion, postnasal drip, sinus pain and sore throat.    Eyes:  Negative for visual disturbance.   Respiratory:  Positive for apnea ("I have a CPAP and I rest real well with my CPAP"). Negative for cough and shortness of breath.    Cardiovascular:  Negative for chest pain.   Gastrointestinal:  Positive for diarrhea and nausea. Negative for abdominal pain and constipation.   Genitourinary:  Negative for dysuria, frequency and urgency.   Musculoskeletal:  Positive for arthralgias. Negative for back pain and neck pain.   Neurological:  Positive for headaches. Negative for dizziness and numbness.   Psychiatric/Behavioral:  Negative for dysphoric mood and sleep disturbance. The patient is nervous/anxious.       Medications:  Outpatient Encounter Medications as of 9/20/2022   Medication Sig Dispense Refill    ACCU-CHEK GUIDE GLUCOSE METER Misc 1 strip by Misc.(Non-Drug; Combo Route) route once daily.      ACCU-CHEK SOFTCLIX LANCETS Misc 1 lancet by Misc.(Non-Drug; Combo Route) route once daily. Use to checks usgar one x day.      amLODIPine (NORVASC) 5 MG tablet Take 5 mg by mouth.      aspirin (ECOTRIN) 81 MG EC tablet Take 81 mg by mouth once daily.      blood sugar diagnostic Strp 1 each by Misc.(Non-Drug; Combo Route) route once daily. (Patient taking differently: 1 each by Misc.(Non-Drug; Combo Route) route once daily. FOR " ACCUCHECK METER, use to check sugar 1 x day.) 100 strip 3    cetirizine (ZYRTEC) 10 MG tablet Take 10 mg by mouth once daily.      cholecalciferol, vitamin D3, 125 mcg (5,000 unit) Tab Take 5,000 Units by mouth once daily.      famotidine (PEPCID) 20 MG tablet Take 1 tablet (20 mg total) by mouth 2 (two) times daily. 180 tablet 3    fluticasone propionate (FLONASE) 50 mcg/actuation nasal spray 1 spray (50 mcg total) by Each Nostril route once daily. 9.9 mL 3    Lactobacillus rhamnosus GG (CULTURELLE) 10 billion cell capsule Take 1 capsule by mouth once daily.      lisinopriL (PRINIVIL,ZESTRIL) 40 MG tablet Take 40 mg by mouth.      SORAYA BIOTIN ORAL Take 3,000 mcg by mouth 2 (two) times a day.      metoprolol succinate (TOPROL-XL) 100 MG 24 hr tablet Take 100 mg by mouth once daily.      mirtazapine (REMERON) 7.5 MG Tab Take 1 tablet (7.5 mg total) by mouth every evening. 90 tablet 3    omega-3 fatty acids/fish oil (FISH OIL-OMEGA-3 FATTY ACIDS) 300-1,000 mg capsule Take by mouth once daily.      ondansetron (ZOFRAN) 4 MG tablet Take 1 tablet (4 mg total) by mouth every 6 (six) hours as needed for Nausea. 30 tablet 2    pravastatin (PRAVACHOL) 20 MG tablet Take 1 tablet (20 mg total) by mouth nightly. 90 tablet 1    SITagliptan-metformin (JANUMET XR) 50-1,000 mg TM24 Take 2 tablets in the am po 180 tablet 3    venlafaxine (EFFEXOR-XR) 37.5 MG 24 hr capsule Take 37.5 mg by mouth once daily.      butalbitaL-acetaminop-caf-cod -45-30 mg Cap Take by mouth.      [DISCONTINUED] metoprolol succinate 100 mg CSpX 1 capsule      [DISCONTINUED] soy isofla/blk cohosh/mag bark (ESTROVEN ORAL) Take 1 tablet by mouth once daily at 6am.      [DISCONTINUED] vancomycin (VANCOCIN) 125 MG capsule as directed       No facility-administered encounter medications on file as of 9/20/2022.       Allergies:  Review of patient's allergies indicates:   Allergen Reactions    Augmentin [amoxicillin-pot clavulanate] Diarrhea     "Clarithromycin Diarrhea       Health Maintenance:  Immunization History   Administered Date(s) Administered    COVID-19, MRNA, LN-S, PF (MODERNA FULL 0.5 ML DOSE) 02/12/2021, 03/16/2021, 11/05/2021, 06/10/2022    Influenza - Quadrivalent - High Dose - PF (65 years and older) 01/26/2022    Influenza - Trivalent (ADULT) 01/28/2016      Health Maintenance   Topic Date Due    Hepatitis C Screening  09/20/2022 (Originally 1945)    Hemoglobin A1c  10/01/2022 (Originally 7/26/2022)    TETANUS VACCINE  09/20/2023 (Originally 6/28/1963)    DEXA Scan  09/20/2023 (Originally 6/28/1986)    Eye Exam  09/27/2023 (Originally 2/9/2022)    Lipid Panel  10/26/2022    Foot Exam  03/21/2023        Physical Exam      Vital Signs  Temp: 97.2 °F (36.2 °C)  Pulse: 76  Resp: 18  SpO2: 98 %  BP: (!) 142/82  BP Location: Left arm  Patient Position: Sitting  Pain Score:   7  Pain Loc: Head  Height and Weight  Height: 5' 6" (167.6 cm)  Weight: 70.2 kg (154 lb 12.8 oz)  BSA (Calculated - sq m): 1.81 sq meters  BMI (Calculated): 25  Weight in (lb) to have BMI = 25: 154.6]    Physical Exam  HENT:      Head: Normocephalic.      Right Ear: Tympanic membrane normal.      Left Ear: Tympanic membrane normal.   Cardiovascular:      Rate and Rhythm: Normal rate and regular rhythm.      Pulses: Normal pulses.      Heart sounds: Normal heart sounds.   Pulmonary:      Effort: Pulmonary effort is normal.      Breath sounds: Normal breath sounds.   Abdominal:      General: Bowel sounds are normal.      Palpations: Abdomen is soft.   Musculoskeletal:         General: Normal range of motion.      Cervical back: Normal range of motion.   Skin:     General: Skin is warm and dry.   Neurological:      Mental Status: She is alert and oriented to person, place, and time.      Gait: Gait is intact.   Psychiatric:         Mood and Affect: Mood normal.         Behavior: Behavior normal.        Laboratory:  CBC:  Recent Labs   Lab 06/14/21  1128 07/02/21  1138 " 02/21/22  0832 06/07/22  0928   WBC 4.64 4.89 5.31  --    RBC 3.92 L 4.09 L 4.00 L  --    Hemoglobin 10.9 L 11.2 L 11.5 L 11.2 L   Hematocrit 34.3 L 35.8 L 35.4 L 33.7 L   Platelet Count 197 218 186  --    MCV 87.5 87.5 88.5  --    MCH 27.8 27.4 28.8  --    MCHC 31.8 L 31.3 L 32.5  --      CMP:  Recent Labs   Lab 05/30/21  1041 06/14/21  1128 07/02/21  1138 02/21/22  0832 06/07/22  0928   Glucose 90 79 96 218 H  --    Calcium 9.1 8.8 9.2 9.5  --    Albumin 3.6 3.7 3.8  --   --    Total Protein 7.1 7.0 7.0  --  6.9   Sodium 143 141 143 139  --    Potassium 4.5 4.6 4.7 4.4  --    CO2 29 30 33 H 30  --    Chloride 111 H 106 108 H 105  --    BUN 20 H 14 23 H 22 H  --    Alk Phos 53 L 49 L 49 L  --   --    ALT 17 22 21  --   --    AST 13 L 15 14 L  --   --    Bilirubin, Total 0.2 0.4 0.4  --   --      LIPIDS:  Recent Labs   Lab 10/26/21  0920   HDL Cholesterol 71 H   Cholesterol 138   Triglycerides 74   LDL Calculated 52   Cholesterol/HDL Ratio (Risk Factor) 1.9   Non-HDL 67     A1C:  Recent Labs   Lab 06/16/21  1333 10/26/21  0920 01/26/22  0905   Hemoglobin A1C 6.6 6.6 6.4         Assessment/Plan      1. Chronic intractable headache, unspecified headache type  - Sedimentation Rate; Future  - Sedimentation Rate   -will attempt to get sooner appt with neurology if unsuccessful keep regular scheduled appt      Return to clinic in 1 months.    ERLINDA [facility]

## 2022-09-21 ENCOUNTER — OFFICE VISIT (OUTPATIENT)
Dept: DIABETES SERVICES | Facility: CLINIC | Age: 77
End: 2022-09-21
Payer: MEDICARE

## 2022-09-21 VITALS
RESPIRATION RATE: 16 BRPM | SYSTOLIC BLOOD PRESSURE: 120 MMHG | WEIGHT: 154.81 LBS | OXYGEN SATURATION: 98 % | HEIGHT: 66 IN | HEART RATE: 83 BPM | BODY MASS INDEX: 24.88 KG/M2 | DIASTOLIC BLOOD PRESSURE: 66 MMHG

## 2022-09-21 DIAGNOSIS — E11.9 TYPE 2 DIABETES MELLITUS WITHOUT COMPLICATION, WITHOUT LONG-TERM CURRENT USE OF INSULIN: Primary | ICD-10-CM

## 2022-09-21 DIAGNOSIS — I49.3 PVC'S (PREMATURE VENTRICULAR CONTRACTIONS): ICD-10-CM

## 2022-09-21 DIAGNOSIS — K31.84 GASTROPARESIS: ICD-10-CM

## 2022-09-21 DIAGNOSIS — E78.5 HYPERLIPIDEMIA, UNSPECIFIED HYPERLIPIDEMIA TYPE: ICD-10-CM

## 2022-09-21 DIAGNOSIS — I10 PRIMARY HYPERTENSION: ICD-10-CM

## 2022-09-21 DIAGNOSIS — G47.33 OBSTRUCTIVE SLEEP APNEA SYNDROME: ICD-10-CM

## 2022-09-21 LAB
GLUCOSE SERPL-MCNC: 193 MG/DL (ref 70–110)
HBA1C MFR BLD: 6.4 % (ref 4.5–6.6)

## 2022-09-21 PROCEDURE — 95251 PR GLUCOSE MONITOR, 72 HOUR, PHYS INTERP: ICD-10-PCS | Mod: ,,, | Performed by: NURSE PRACTITIONER

## 2022-09-21 PROCEDURE — 3288F FALL RISK ASSESSMENT DOCD: CPT | Mod: CPTII,,, | Performed by: NURSE PRACTITIONER

## 2022-09-21 PROCEDURE — 3074F SYST BP LT 130 MM HG: CPT | Mod: CPTII,,, | Performed by: NURSE PRACTITIONER

## 2022-09-21 PROCEDURE — 3074F PR MOST RECENT SYSTOLIC BLOOD PRESSURE < 130 MM HG: ICD-10-PCS | Mod: CPTII,,, | Performed by: NURSE PRACTITIONER

## 2022-09-21 PROCEDURE — 1159F PR MEDICATION LIST DOCUMENTED IN MEDICAL RECORD: ICD-10-PCS | Mod: CPTII,,, | Performed by: NURSE PRACTITIONER

## 2022-09-21 PROCEDURE — 99215 OFFICE O/P EST HI 40 MIN: CPT | Mod: PBBFAC | Performed by: NURSE PRACTITIONER

## 2022-09-21 PROCEDURE — 1159F MED LIST DOCD IN RCRD: CPT | Mod: CPTII,,, | Performed by: NURSE PRACTITIONER

## 2022-09-21 PROCEDURE — 99213 OFFICE O/P EST LOW 20 MIN: CPT | Mod: S$PBB,,, | Performed by: NURSE PRACTITIONER

## 2022-09-21 PROCEDURE — 3288F PR FALLS RISK ASSESSMENT DOCUMENTED: ICD-10-PCS | Mod: CPTII,,, | Performed by: NURSE PRACTITIONER

## 2022-09-21 PROCEDURE — 1160F RVW MEDS BY RX/DR IN RCRD: CPT | Mod: CPTII,,, | Performed by: NURSE PRACTITIONER

## 2022-09-21 PROCEDURE — 82962 GLUCOSE BLOOD TEST: CPT | Mod: PBBFAC | Performed by: NURSE PRACTITIONER

## 2022-09-21 PROCEDURE — 3078F PR MOST RECENT DIASTOLIC BLOOD PRESSURE < 80 MM HG: ICD-10-PCS | Mod: CPTII,,, | Performed by: NURSE PRACTITIONER

## 2022-09-21 PROCEDURE — 1160F PR REVIEW ALL MEDS BY PRESCRIBER/CLIN PHARMACIST DOCUMENTED: ICD-10-PCS | Mod: CPTII,,, | Performed by: NURSE PRACTITIONER

## 2022-09-21 PROCEDURE — 1100F PR PT FALLS ASSESS DOC 2+ FALLS/FALL W/INJURY/YR: ICD-10-PCS | Mod: CPTII,,, | Performed by: NURSE PRACTITIONER

## 2022-09-21 PROCEDURE — 95251 CONT GLUC MNTR ANALYSIS I&R: CPT | Mod: ,,, | Performed by: NURSE PRACTITIONER

## 2022-09-21 PROCEDURE — 99213 PR OFFICE/OUTPT VISIT, EST, LEVL III, 20-29 MIN: ICD-10-PCS | Mod: S$PBB,,, | Performed by: NURSE PRACTITIONER

## 2022-09-21 PROCEDURE — 1100F PTFALLS ASSESS-DOCD GE2>/YR: CPT | Mod: CPTII,,, | Performed by: NURSE PRACTITIONER

## 2022-09-21 PROCEDURE — 83036 HEMOGLOBIN GLYCOSYLATED A1C: CPT | Mod: PBBFAC | Performed by: NURSE PRACTITIONER

## 2022-09-21 PROCEDURE — 3078F DIAST BP <80 MM HG: CPT | Mod: CPTII,,, | Performed by: NURSE PRACTITIONER

## 2022-09-21 RX ORDER — LANCETS
1 EACH MISCELLANEOUS DAILY
Qty: 100 EACH | Refills: 3 | Status: SHIPPED | OUTPATIENT
Start: 2022-09-21 | End: 2023-09-13

## 2022-09-21 NOTE — PROGRESS NOTES
Subjective:       Patient ID: Fatmata Tolbert is a 77 y.o. female.    Chief Complaint: Diabetes Mellitus (Pt here for follow up visit and A1c, reports she has been to the ER x 2 due to unrelenting headache.  States she has appt with neurologist 10/27. Checks sugar 1 x day.)    Here today for routine evaluation and med refill.  She is having trouble with constant headaches, has been evaluated and has an appt with neurology for initial visit at the end of this month.   Is UTD with eye exam and upcoming appt is in December.     [place] average of 105, lowest 92 as highest 117    Hemoglobin A1C       Date                     Value               Ref Range           Status                09/21/2022               6.4                 4.5 - 6.6 %         Final                 01/26/2022               6.4                 4.5 - 6.6 %         Final                 10/26/2021               6.6                 4.5 - 6.6 %         Final                 06/16/2021               6.6                 4.5 - 6.6 %         Final            ----------  Lab Results       Component                Value               Date                       MICROALBUR               9.3 (H)             01/26/2022            Lab Results       Component                Value               Date                       CHOL                     138                 10/26/2021            Lab Results       Component                Value               Date                       HDL                      71 (H)              10/26/2021            Lab Results       Component                Value               Date                       LDLCALC                  52                  10/26/2021            Lab Results       Component                Value               Date                       TRIG                     74                  10/26/2021            Lab Results       Component                Value               Date                       CHOLHDL                  1.9                  10/26/2021            CMP  Sodium       Date                     Value               Ref Range           Status                02/21/2022               139                 136 - 145 mmol*     Final            ----------  Potassium       Date                     Value               Ref Range           Status                02/21/2022               4.4                 3.5 - 5.1 mmol*     Final            ----------  Chloride       Date                     Value               Ref Range           Status                02/21/2022               105                 98 - 107 mmol/L     Final            ----------  CO2       Date                     Value               Ref Range           Status                02/21/2022               30                  21 - 32 mmol/L      Final            ----------  Glucose       Date                     Value               Ref Range           Status                02/21/2022               218 (H)             74 - 106 mg/dL      Final            ----------  BUN       Date                     Value               Ref Range           Status                02/21/2022               22 (H)              7 - 18 mg/dL        Final            ----------  Creatinine       Date                     Value               Ref Range           Status                02/21/2022               0.97                0.55 - 1.02 mg*     Final            ----------  Calcium       Date                     Value               Ref Range           Status                02/21/2022               9.5                 8.5 - 10.1 mg/*     Final            ----------  Total Protein       Date                     Value               Ref Range           Status                06/07/2022               6.9                 6.4 - 8.2 g/dL      Final            ----------  Albumin       Date                     Value               Ref Range           Status                07/02/2021               3.8                 3.5 -  5.0 g/dL      Final            ----------  Bilirubin, Total       Date                     Value               Ref Range           Status                07/02/2021               0.4                 >0.0 - 1.2 mg/*     Final            ----------  Alk Phos       Date                     Value               Ref Range           Status                07/02/2021               49 (L)              55 - 142 U/L        Final            ----------  AST       Date                     Value               Ref Range           Status                07/02/2021               14 (L)              15 - 37 U/L         Final            ----------  ALT       Date                     Value               Ref Range           Status                07/02/2021               21                  13 - 56 U/L         Final            ----------  Anion Gap       Date                     Value               Ref Range           Status                02/21/2022               8                   7 - 16 mmol/L       Final            ----------  eGFR        Date                     Value               Ref Range           Status                07/02/2021               81                  >=60 mL/min/1.*     Final            ----------  eGFR       Date                     Value               Ref Range           Status                02/21/2022               60                  >=60 mL/min/1.*     Final            ----------      Review of Systems   Constitutional:  Negative for activity change, appetite change, diaphoresis and fatigue.   HENT:  Negative for nasal congestion, facial swelling and sinus pressure/congestion.    Eyes:  Negative for visual disturbance.   Respiratory:  Negative for shortness of breath and wheezing.    Cardiovascular:  Negative for chest pain and leg swelling.   Gastrointestinal:  Negative for constipation, diarrhea, nausea and vomiting.   Endocrine: Negative for polydipsia, polyphagia and polyuria.   Genitourinary:   Negative for dysuria, frequency and urgency.   Musculoskeletal:  Negative for gait problem and myalgias.   Integumentary:  Negative for color change, rash and wound.   Neurological:  Negative for dizziness, syncope, weakness, headaches, coordination difficulties and coordination difficulties.   Hematological:  Does not bruise/bleed easily.   Psychiatric/Behavioral:  Negative for self-injury, sleep disturbance and suicidal ideas. The patient is not nervous/anxious.        Objective:      Physical Exam  Vitals and nursing note reviewed.   Constitutional:       Appearance: Normal appearance.   HENT:      Head: Normocephalic.   Cardiovascular:      Rate and Rhythm: Normal rate.   Pulmonary:      Effort: Pulmonary effort is normal.   Musculoskeletal:         General: Normal range of motion.   Skin:     General: Skin is warm and dry.   Neurological:      General: No focal deficit present.      Mental Status: She is alert and oriented to person, place, and time.   Psychiatric:         Mood and Affect: Mood normal.         Behavior: Behavior normal.         Thought Content: Thought content normal.         Judgment: Judgment normal.       Assessment:       Problem List Items Addressed This Visit          Cardiac/Vascular    Hyperlipidemia    Hypertension    PVC's (premature ventricular contractions)       Endocrine    Type 2 diabetes mellitus - Primary    Relevant Medications    ACCU-CHEK SOFTCLIX LANCETS Misc    Other Relevant Orders    Hemoglobin A1C, POCT (Completed)    POCT Glucose, Hand-Held Device (Completed)    Comprehensive Metabolic Panel    Lipid Panel    Microalbumin/Creatinine Ratio, Urine       GI    Gastroparesis       Other    Obstructive sleep apnea syndrome       Plan:       Problem List Items Addressed This Visit          Cardiac/Vascular    Hyperlipidemia    Hypertension    PVC's (premature ventricular contractions)       Endocrine    Type 2 diabetes mellitus - Primary    Relevant Medications    ACCU-CHEK  SOFTCLIX LANCETS Misc    Other Relevant Orders    Hemoglobin A1C, POCT (Completed)    POCT Glucose, Hand-Held Device (Completed)    Comprehensive Metabolic Panel    Lipid Panel    Microalbumin/Creatinine Ratio, Urine       GI    Gastroparesis       Other    Obstructive sleep apnea syndrome     No changes  Lifestyle modifications

## 2022-09-21 NOTE — PATIENT INSTRUCTIONS
No change in meds today    Monitor and document glucose daily alternating between fasting and two hours pp and bring in meter to next visit.    Exercise 4-5 times per week.    Work to obtain normal weight.   Take all medications as directed.  Snacks with 0-5 grams carbs   Hard Boiled Egg   Crystal Light, Vitamin Water, Powerade Zero   Herbal tea, unsweetened   8 oz unsweetened almond milk   2 tsp peanut butter on celery   ½ cup sugar-free Jell-O   1 sugar-free popsicle   Non starchy vegetables such as carrots or celery sticks with lowfat dressing   ½ oz lowfat cheese or string cheese   1 closed handful of nuts or tbsp of seeds, unsalted    Snacks with 15 gram carbs  . 1 small piece of fruit or . banana or . cup light canned fruit  . 3 reilly cracker squares  . 3 cups popcorn  . 5 Vanilla Wafers  . 1/2 cup low fat, no added sugar ice cream or frozen yogurt  . 1/2 turkey, ham, or chicken sandwich  . 1.2 cup fruit with 1/2 cup of cottage cheese  . 4-6 unsalted wheat crackers with 1 oz low fat cheese or 1 tbsp peanut butter  . 30 goldfish crackers  . 7-8 mini rice cakes  . 1/3 cup hummus dip with raw vegetables  . 1/2 whole wheat cleo, 1 tbsp hummus  . Mini pizza (. whole wheat English muffin, low-fat cheese, tomato sauce)  . 100 calorie snack pack  . 4-6 oz light yogurt  . 1/2 cup sugar-free pudding

## 2022-09-22 NOTE — PROGRESS NOTES
Subjective:       Patient ID: Fatmata Tolbert is a 77 y.o. female.    Chief Complaint:  No chief complaint on file.      History of Present Illness  This pleasant 77 year old right handed  female presents to the clinic as a new patient referral from Dr. SHIRLENE Woodson for headaches and paresthesia. Patient states headache has been constant since second week of August 2022. She reports a daily headache that she rates as a 5.5 on a scale of 0 to 10 with burning, tingling and aching pain. Headaches are in the occipital area that decreases her ADL's and is worse with activity. Headache trigger is unknown and she denies any aura. She states the headache had a gradual onset with photophobia. Resting in a quiet dark room makes the headaches better while lights make the headaches worse. She last seen the eye doctor in June 2022 and has another follow up in December 2022 due to diabetes. She denies any neck or head injury. She takes aleve over the counter twice a day and every day. Discussed the rebound headache in detail. I instructed the patient to taper off the aleve and any other over the counter headache medication in the next 7 to 10 days and that the headaches may get worse for two or three weeks before getting better. She is on metoprolol  mg daily and I discussed with the patient that this is a beta blocker and will help prevent the headaches. She was on Fioricet and has been out of this medication. I discussed with her that this medication can also cause the rebound headache and we will discontinue this. She does have ANNA and reports compliance with wearing the Cpap. She denies kidney stones or glaucoma. Her family medical history includes CVA, HTN, prostate cancer, breast cancer, headaches  and brain tumor. Her PMH includes DM, anxiety, HTN, chest pain, Heart failure, high cholesterol, C-diff, anemia, GERD, gastroparesis, DJD, ANNA, and heart ablation. She denies smoking or drinking alcohol and  states she had a total hysterectomy in the past. Discussed adding Vistaril 25 mg one twice a day as needed for the headache, no more than 3 days a week. I also discussed the side effects in detail to include drowsiness and encouraged her to read up on the side effects. I instructed her not to drive while taking this medication. Discussed the plan in detail with Neurologist Dr. TRACEE Rodarte and the patient and they are in agreement with the plan. All her questions were answered at today's visit.     MRI of the brain without contrast done on September 9, 2022 at [facility] showed no convincing imaging evidence of acute intracranial abnormality. Probable mild chronic microvascular ischemic changes.     CT of the head without contrast done on August 30, 2022 showed no evidence of abnormality demonstrated.        Review of Systems  Review of Systems   Constitutional:  Negative for activity change, diaphoresis, fever and unexpected weight change.   HENT:  Negative for congestion, ear pain, facial swelling, hearing loss, tinnitus, trouble swallowing and voice change.    Eyes:  Negative for photophobia, pain and visual disturbance.   Respiratory:  Negative for chest tightness, shortness of breath and wheezing.    Cardiovascular:  Negative for chest pain, palpitations and leg swelling.   Gastrointestinal:  Negative for constipation, diarrhea, nausea and vomiting.   Genitourinary:  Negative for difficulty urinating.   Musculoskeletal:  Negative for back pain, gait problem, neck pain and neck stiffness.   Skin:  Negative for color change, pallor, rash and wound.   Neurological:  Positive for numbness and headaches. Negative for dizziness, tremors, seizures, syncope, facial asymmetry, speech difficulty, weakness and light-headedness.   Psychiatric/Behavioral:  Negative for agitation, behavioral problems, confusion, decreased concentration and hallucinations. The patient is not nervous/anxious and is not hyperactive.      Objective:       Neurologic Exam     Mental Status   Oriented to person, place, and time.   Oriented to person.   Oriented to place.   Oriented to time.   Attention: normal. Concentration: normal.   Speech: speech is normal   Level of consciousness: alert  Knowledge: good.     Cranial Nerves     CN II   Visual fields full to confrontation.     CN III, IV, VI   Pupils are equal, round, and reactive to light.  Extraocular motions are normal.   Right pupil: Size: 3 mm. Shape: regular. Reactivity: brisk.   Left pupil: Size: 3 mm. Shape: regular. Reactivity: brisk.   CN III: no CN III palsy  CN VI: no CN VI palsy    CN V   Facial sensation intact.     CN VII   Facial expression full, symmetric.     CN VIII   CN VIII normal.   Hearing: intact    CN IX, X   CN IX normal.   CN X normal.     CN XI   CN XI normal.     CN XII   CN XII normal.     Motor Exam   Muscle bulk: normal  Overall muscle tone: normal  Right arm pronator drift: absent  Left arm pronator drift: absent    Strength   Right deltoid: 5/5  Left deltoid: 5/5  Right biceps: 5/5  Left biceps: 5/5  Right triceps: 5/5  Left triceps: 5/5  Right quadriceps: 5/5  Left quadriceps: 5/5  Right hamstrin/5  Left hamstrin/5    Sensory Exam   Light touch normal.   Vibration normal.   Proprioception normal.   Pinprick normal.     Gait, Coordination, and Reflexes     Gait  Gait: normal    Coordination   Romberg: negative  Finger to nose coordination: normal  Heel to shin coordination: normal  Tandem walking coordination: normal    Tremor   Resting tremor: absent  Intention tremor: absent  Action tremor: absent    Reflexes   Right brachioradialis: 2+  Left brachioradialis: 2+  Right biceps: 2+  Left biceps: 2+  Right triceps: 2+  Left triceps: 2+  Right patellar: 2+  Left patellar: 2+  Right achilles: 2+  Left achilles: 2+  Right : 4+  Left : 4+    Physical Exam  Constitutional:       General: She is not in acute distress.  HENT:      Head: Normocephalic.      Nose: Nose  normal.      Mouth/Throat:      Mouth: Mucous membranes are moist.   Eyes:      Extraocular Movements: Extraocular movements intact and EOM normal.      Pupils: Pupils are equal, round, and reactive to light.   Cardiovascular:      Rate and Rhythm: Normal rate and regular rhythm.      Heart sounds: Normal heart sounds. No murmur heard.  Pulmonary:      Effort: Pulmonary effort is normal. No respiratory distress.      Breath sounds: Normal breath sounds. No wheezing, rhonchi or rales.   Musculoskeletal:         General: No swelling, tenderness, deformity or signs of injury. Normal range of motion.      Cervical back: Normal range of motion. No rigidity or tenderness.      Right lower leg: No edema.      Left lower leg: No edema.   Skin:     General: Skin is warm and dry.      Capillary Refill: Capillary refill takes less than 2 seconds.      Coloration: Skin is not jaundiced or pale.      Findings: No bruising, erythema, lesion or rash.   Neurological:      Mental Status: She is alert and oriented to person, place, and time.      Coordination: Finger-Nose-Finger Test, Heel to Shin Test and Romberg Test normal.      Gait: Gait is intact. Tandem walk normal.      Deep Tendon Reflexes:      Reflex Scores:       Tricep reflexes are 2+ on the right side and 2+ on the left side.       Bicep reflexes are 2+ on the right side and 2+ on the left side.       Brachioradialis reflexes are 2+ on the right side and 2+ on the left side.       Patellar reflexes are 2+ on the right side and 2+ on the left side.       Achilles reflexes are 2+ on the right side and 2+ on the left side.  Psychiatric:         Mood and Affect: Mood normal.         Speech: Speech normal.         Behavior: Behavior normal.         Assessment:     Problem List Items Addressed This Visit          Neuro    Occipital headache - Primary    Relevant Medications    hydrOXYzine pamoate (VISTARIL) 25 MG Cap    Other Relevant Orders    CBC Auto Differential     Comprehensive Metabolic Panel    Vitamin B12 & Folate    TSH    T4, Free    Vitamin D    Sedimentation Rate    C-Reactive Protein    Rebound headache       Palliative Care    On long term drug therapy    Relevant Orders    CBC Auto Differential    Comprehensive Metabolic Panel    Vitamin B12 & Folate    TSH    T4, Free    Vitamin D    Sedimentation Rate    C-Reactive Protein       Other    Sleep apnea         Plan:     Labs: b12, folate, cbc, cmp, tsh, free t4, vit d, esr, crp  Headache diary   Continue Cpap as prescribed  Taper off over the counter headache medications next 7 to 10 days, headaches may get worse for two or three weeks before getting better  Rx Vistaril 25 mg take one tablet twice a day as needed, no more than two in a day or two days in a week, discussed side effects  Follow up with neurology in one month or sooner if needed

## 2022-09-27 ENCOUNTER — OFFICE VISIT (OUTPATIENT)
Dept: NEUROLOGY | Facility: CLINIC | Age: 77
End: 2022-09-27
Payer: MEDICARE

## 2022-09-27 VITALS
DIASTOLIC BLOOD PRESSURE: 84 MMHG | WEIGHT: 154.38 LBS | HEART RATE: 81 BPM | OXYGEN SATURATION: 97 % | BODY MASS INDEX: 24.81 KG/M2 | HEIGHT: 66 IN | SYSTOLIC BLOOD PRESSURE: 132 MMHG

## 2022-09-27 DIAGNOSIS — R51.9 OCCIPITAL HEADACHE: Primary | ICD-10-CM

## 2022-09-27 DIAGNOSIS — G44.40 REBOUND HEADACHE: ICD-10-CM

## 2022-09-27 DIAGNOSIS — Z79.899 ON LONG TERM DRUG THERAPY: ICD-10-CM

## 2022-09-27 DIAGNOSIS — G47.30 SLEEP APNEA, UNSPECIFIED TYPE: ICD-10-CM

## 2022-09-27 PROCEDURE — 1159F PR MEDICATION LIST DOCUMENTED IN MEDICAL RECORD: ICD-10-PCS | Mod: CPTII,,, | Performed by: NURSE PRACTITIONER

## 2022-09-27 PROCEDURE — 99214 PR OFFICE/OUTPT VISIT, EST, LEVL IV, 30-39 MIN: ICD-10-PCS | Mod: S$PBB,,, | Performed by: NURSE PRACTITIONER

## 2022-09-27 PROCEDURE — 1160F PR REVIEW ALL MEDS BY PRESCRIBER/CLIN PHARMACIST DOCUMENTED: ICD-10-PCS | Mod: CPTII,,, | Performed by: NURSE PRACTITIONER

## 2022-09-27 PROCEDURE — 3079F PR MOST RECENT DIASTOLIC BLOOD PRESSURE 80-89 MM HG: ICD-10-PCS | Mod: CPTII,,, | Performed by: NURSE PRACTITIONER

## 2022-09-27 PROCEDURE — 3288F FALL RISK ASSESSMENT DOCD: CPT | Mod: CPTII,,, | Performed by: NURSE PRACTITIONER

## 2022-09-27 PROCEDURE — 3075F SYST BP GE 130 - 139MM HG: CPT | Mod: CPTII,,, | Performed by: NURSE PRACTITIONER

## 2022-09-27 PROCEDURE — 1101F PR PT FALLS ASSESS DOC 0-1 FALLS W/OUT INJ PAST YR: ICD-10-PCS | Mod: CPTII,,, | Performed by: NURSE PRACTITIONER

## 2022-09-27 PROCEDURE — 3075F PR MOST RECENT SYSTOLIC BLOOD PRESS GE 130-139MM HG: ICD-10-PCS | Mod: CPTII,,, | Performed by: NURSE PRACTITIONER

## 2022-09-27 PROCEDURE — 99214 OFFICE O/P EST MOD 30 MIN: CPT | Mod: PBBFAC | Performed by: NURSE PRACTITIONER

## 2022-09-27 PROCEDURE — 1101F PT FALLS ASSESS-DOCD LE1/YR: CPT | Mod: CPTII,,, | Performed by: NURSE PRACTITIONER

## 2022-09-27 PROCEDURE — 99214 OFFICE O/P EST MOD 30 MIN: CPT | Mod: S$PBB,,, | Performed by: NURSE PRACTITIONER

## 2022-09-27 PROCEDURE — 3288F PR FALLS RISK ASSESSMENT DOCUMENTED: ICD-10-PCS | Mod: CPTII,,, | Performed by: NURSE PRACTITIONER

## 2022-09-27 PROCEDURE — 3079F DIAST BP 80-89 MM HG: CPT | Mod: CPTII,,, | Performed by: NURSE PRACTITIONER

## 2022-09-27 PROCEDURE — 1160F RVW MEDS BY RX/DR IN RCRD: CPT | Mod: CPTII,,, | Performed by: NURSE PRACTITIONER

## 2022-09-27 PROCEDURE — 1159F MED LIST DOCD IN RCRD: CPT | Mod: CPTII,,, | Performed by: NURSE PRACTITIONER

## 2022-09-27 RX ORDER — HYDROXYZINE PAMOATE 25 MG/1
CAPSULE ORAL
Qty: 30 CAPSULE | Refills: 2 | Status: SHIPPED | OUTPATIENT
Start: 2022-09-27 | End: 2023-03-22

## 2022-09-27 NOTE — PATIENT INSTRUCTIONS
Labs: b12, folate, cbc, cmp, tsh, free t4, vit d, esr, crp  Headache diary   Continue Cpap as prescribed  Taper off over the counter headache medications next 7 to 10 days, headaches may get worse for two or three weeks before getting better  Rx Vistaril 25 mg take one tablet twice a day as needed, no more than two in a day or two days in a week, discussed side effects  Follow up with neurology in one month or sooner if needed

## 2022-09-30 ENCOUNTER — TELEPHONE (OUTPATIENT)
Dept: NEUROLOGY | Facility: CLINIC | Age: 77
End: 2022-09-30
Payer: MEDICARE

## 2022-09-30 NOTE — TELEPHONE ENCOUNTER
Pt voiced understanding    ----- Message from Haresh Chavira NP sent at 9/28/2022  1:06 PM CDT -----  Please let patient know her labs showed her blood sugar was a little elevated at 135, and her RBC, H&H were a little low suggesting some mild anemia, remind her to keep her blood sugar under control and she can follow these up with pcp at her next scheduled visit, thanks

## 2022-10-05 ENCOUNTER — OFFICE VISIT (OUTPATIENT)
Dept: GASTROENTEROLOGY | Facility: CLINIC | Age: 77
End: 2022-10-05
Payer: MEDICARE

## 2022-10-05 VITALS
HEART RATE: 84 BPM | BODY MASS INDEX: 24.62 KG/M2 | WEIGHT: 153.19 LBS | OXYGEN SATURATION: 98 % | DIASTOLIC BLOOD PRESSURE: 56 MMHG | HEIGHT: 66 IN | SYSTOLIC BLOOD PRESSURE: 105 MMHG

## 2022-10-05 DIAGNOSIS — K31.84 GASTROPARESIS: ICD-10-CM

## 2022-10-05 DIAGNOSIS — K44.9 HH (HIATUS HERNIA): ICD-10-CM

## 2022-10-05 DIAGNOSIS — K21.9 GASTROESOPHAGEAL REFLUX DISEASE, UNSPECIFIED WHETHER ESOPHAGITIS PRESENT: Primary | ICD-10-CM

## 2022-10-05 DIAGNOSIS — K29.00 ACUTE SUPERFICIAL GASTRITIS WITHOUT HEMORRHAGE: ICD-10-CM

## 2022-10-05 PROCEDURE — 3288F FALL RISK ASSESSMENT DOCD: CPT | Mod: CPTII,,, | Performed by: NURSE PRACTITIONER

## 2022-10-05 PROCEDURE — 1160F RVW MEDS BY RX/DR IN RCRD: CPT | Mod: CPTII,,, | Performed by: NURSE PRACTITIONER

## 2022-10-05 PROCEDURE — 3074F PR MOST RECENT SYSTOLIC BLOOD PRESSURE < 130 MM HG: ICD-10-PCS | Mod: CPTII,,, | Performed by: NURSE PRACTITIONER

## 2022-10-05 PROCEDURE — 1101F PR PT FALLS ASSESS DOC 0-1 FALLS W/OUT INJ PAST YR: ICD-10-PCS | Mod: CPTII,,, | Performed by: NURSE PRACTITIONER

## 2022-10-05 PROCEDURE — 99213 PR OFFICE/OUTPT VISIT, EST, LEVL III, 20-29 MIN: ICD-10-PCS | Mod: ,,, | Performed by: NURSE PRACTITIONER

## 2022-10-05 PROCEDURE — 1101F PT FALLS ASSESS-DOCD LE1/YR: CPT | Mod: CPTII,,, | Performed by: NURSE PRACTITIONER

## 2022-10-05 PROCEDURE — 3074F SYST BP LT 130 MM HG: CPT | Mod: CPTII,,, | Performed by: NURSE PRACTITIONER

## 2022-10-05 PROCEDURE — 99213 OFFICE O/P EST LOW 20 MIN: CPT | Mod: ,,, | Performed by: NURSE PRACTITIONER

## 2022-10-05 PROCEDURE — 3078F DIAST BP <80 MM HG: CPT | Mod: CPTII,,, | Performed by: NURSE PRACTITIONER

## 2022-10-05 PROCEDURE — 1159F MED LIST DOCD IN RCRD: CPT | Mod: CPTII,,, | Performed by: NURSE PRACTITIONER

## 2022-10-05 PROCEDURE — 1160F PR REVIEW ALL MEDS BY PRESCRIBER/CLIN PHARMACIST DOCUMENTED: ICD-10-PCS | Mod: CPTII,,, | Performed by: NURSE PRACTITIONER

## 2022-10-05 PROCEDURE — 3078F PR MOST RECENT DIASTOLIC BLOOD PRESSURE < 80 MM HG: ICD-10-PCS | Mod: CPTII,,, | Performed by: NURSE PRACTITIONER

## 2022-10-05 PROCEDURE — 1159F PR MEDICATION LIST DOCUMENTED IN MEDICAL RECORD: ICD-10-PCS | Mod: CPTII,,, | Performed by: NURSE PRACTITIONER

## 2022-10-05 PROCEDURE — 3288F PR FALLS RISK ASSESSMENT DOCUMENTED: ICD-10-PCS | Mod: CPTII,,, | Performed by: NURSE PRACTITIONER

## 2022-10-05 NOTE — PROGRESS NOTES
Fatmtaa Tolbert is a 77 y.o. female here for Follow-up        PCP: Seble Mena  Referring Provider: No referring provider defined for this encounter.     HPI:  Presents for follow up appointment. EGD 7/14/22, 4 cm hiatal hernia, mild gastritis. Negative for H pylori. She has gastroparesis. Reviewed low residue and small frequent meals with the patient. Continues to have some nausea but no vomiting. States that she feels that this is better. History of CDIFF. Reports constipation at times. Last colonoscopy 2019, with recommendation to repeat in 5 years.    Follow-up  Associated symptoms include headaches (improved) and nausea. Pertinent negatives include no abdominal pain, change in bowel habit, chest pain, fatigue, fever or vomiting.       ROS:  Review of Systems   Constitutional:  Negative for appetite change, fatigue, fever and unexpected weight change.   HENT:  Negative for trouble swallowing.    Respiratory:  Negative for shortness of breath.    Cardiovascular:  Negative for chest pain.   Gastrointestinal:  Positive for constipation and nausea. Negative for abdominal pain, blood in stool, change in bowel habit, diarrhea, vomiting, reflux and change in bowel habit.   Musculoskeletal:  Negative for gait problem.   Integumentary:  Negative for pallor.   Neurological:  Positive for headaches (improved). Negative for dizziness and light-headedness.   Psychiatric/Behavioral:  The patient is not nervous/anxious.         PMHX:  has a past medical history of Acute superficial gastritis without hemorrhage (7/14/2022), Anxiety, Cardiomyopathy, Diabetes mellitus, type 2, Diabetes, polyneuropathy, DJD (degenerative joint disease), multiple sites, Duodenitis (7/14/2022), GERD (gastroesophageal reflux disease), HH (hiatus hernia) (7/14/2022), HTN (hypertension), Hyperlipidemia, ANNA (obstructive sleep apnea), and Pseudophakia (02/09/2021).    PSHX:  has a past surgical history that includes Breast biopsy and  Hysterectomy.    PFHX: family history includes Breast cancer in her maternal grandmother and sister; Diabetes in her mother; Heart disease in her father and sister; Hypertension in her father and sister; Kidney disease in her brother; Prostate cancer in her father; Stroke in her brother, brother, mother, and sister.    PSlHX:  reports that she has never smoked. She has never used smokeless tobacco. She reports that she does not drink alcohol and does not use drugs.        Review of patient's allergies indicates:   Allergen Reactions    Augmentin [amoxicillin-pot clavulanate] Diarrhea    Clarithromycin Diarrhea       Medication List with Changes/Refills   Current Medications    ACCU-CHEK GUIDE GLUCOSE METER MISC    1 strip by Misc.(Non-Drug; Combo Route) route once daily.    ACCU-CHEK SOFTCLIX LANCETS MISC    1 lancet by Misc.(Non-Drug; Combo Route) route once daily. Use to checks usgar one x day.    AMLODIPINE (NORVASC) 5 MG TABLET    Take 5 mg by mouth.    ASPIRIN (ECOTRIN) 81 MG EC TABLET    Take 81 mg by mouth once daily.    BLOOD SUGAR DIAGNOSTIC STRP    1 each by Misc.(Non-Drug; Combo Route) route once daily.    CETIRIZINE (ZYRTEC) 10 MG TABLET    Take 10 mg by mouth once daily.    CHOLECALCIFEROL, VITAMIN D3, 125 MCG (5,000 UNIT) TAB    Take 5,000 Units by mouth once daily.    FAMOTIDINE (PEPCID) 20 MG TABLET    Take 1 tablet (20 mg total) by mouth 2 (two) times daily.    FLUTICASONE PROPIONATE (FLONASE) 50 MCG/ACTUATION NASAL SPRAY    1 spray (50 mcg total) by Each Nostril route once daily.    HYDROXYZINE PAMOATE (VISTARIL) 25 MG CAP    Take one capsule twice a day as needed for headache, no more than two in a day or three days in a week    LACTOBACILLUS RHAMNOSUS GG (CULTURELLE) 10 BILLION CELL CAPSULE    Take 1 capsule by mouth once daily.    LISINOPRIL (PRINIVIL,ZESTRIL) 40 MG TABLET    Take 40 mg by mouth.    SORAYA BIOTIN ORAL    Take 3,000 mcg by mouth 2 (two) times a day.    METOPROLOL SUCCINATE  "(TOPROL-XL) 100 MG 24 HR TABLET    Take 100 mg by mouth once daily.    MIRTAZAPINE (REMERON) 7.5 MG TAB    Take 1 tablet (7.5 mg total) by mouth every evening.    OMEGA-3 FATTY ACIDS/FISH OIL (FISH OIL-OMEGA-3 FATTY ACIDS) 300-1,000 MG CAPSULE    Take by mouth once daily.    ONDANSETRON (ZOFRAN) 4 MG TABLET    Take 1 tablet (4 mg total) by mouth every 6 (six) hours as needed for Nausea.    PRAVASTATIN (PRAVACHOL) 20 MG TABLET    Take 1 tablet (20 mg total) by mouth nightly.    SITAGLIPTAN-METFORMIN (JANUMET XR) 50-1,000 MG TM24    Take 2 tablets in the am po    VENLAFAXINE (EFFEXOR-XR) 37.5 MG 24 HR CAPSULE    Take 37.5 mg by mouth once daily.        Objective Findings:  Vital Signs:  BP (!) 105/56   Pulse 84   Ht 5' 6" (1.676 m)   Wt 69.5 kg (153 lb 3.2 oz)   SpO2 98%   BMI 24.73 kg/m²  Body mass index is 24.73 kg/m².    Physical Exam:  Physical Exam  Vitals and nursing note reviewed.   Constitutional:       General: She is not in acute distress.     Appearance: Normal appearance.   HENT:      Mouth/Throat:      Mouth: Mucous membranes are moist.   Cardiovascular:      Rate and Rhythm: Normal rate.   Pulmonary:      Effort: Pulmonary effort is normal.      Breath sounds: No wheezing, rhonchi or rales.   Abdominal:      General: Bowel sounds are normal. There is no distension.      Palpations: Abdomen is soft. There is no mass.      Tenderness: There is no abdominal tenderness. There is no guarding or rebound.      Hernia: No hernia is present.   Skin:     General: Skin is warm and dry.      Coloration: Skin is not jaundiced or pale.   Neurological:      Mental Status: She is alert and oriented to person, place, and time.   Psychiatric:         Mood and Affect: Mood normal.        Labs:  Lab Results   Component Value Date    WBC 5.57 09/28/2022    HGB 11.7 (L) 09/28/2022    HCT 36.0 (L) 09/28/2022    MCV 89.6 09/28/2022    RDW 13.1 09/28/2022     09/28/2022    LYMPH 40.9 09/28/2022    LYMPH 2.28 " 09/28/2022    MONO 8.3 (H) 09/28/2022    EOS 0.19 09/28/2022    BASO 0.02 09/28/2022     Lab Results   Component Value Date     09/28/2022    K 4.7 09/28/2022     09/28/2022    CO2 30 09/28/2022     (H) 09/28/2022    BUN 18 09/28/2022    CREATININE 0.91 09/28/2022    CALCIUM 9.5 09/28/2022    PROT 6.8 09/28/2022    ALBUMIN 3.8 09/28/2022    BILITOT 0.4 09/28/2022    ALKPHOS 51 (L) 09/28/2022    AST 15 09/28/2022    ALT 22 09/28/2022         Imaging: No results found.      Assessment:  Fatmata Tolbert is a 77 y.o. female here with:  1. Gastroesophageal reflux disease, unspecified whether esophagitis present    2. Gastroparesis    3. HH (hiatus hernia)    4. Acute superficial gastritis without hemorrhage          Recommendations:  1. Avoid spicy, greasy foods  Avoid caffeine, citric acid, chocolate, peppermint, and carbonated drinks  Do not lay down within 3 hours of eating  Exercise 150 minutes per week  Increase fluid to 64 ounces daily  Avoid antiinflammatory medications such as motrin, advil, aleve, ibuprofen, and BC powder  2.Eat 6-8 small meals per day.  Avoid raw fruits and vegetables  Small amount of protein and fiber at one time  3. May take Miralax powder 17 grams daily for constipation      Follow up in about 6 months (around 4/5/2023).      Order summary:       Thank you for allowing me to participate in the care of Fatmata Tolbert.      NAVNEET Solares

## 2022-10-05 NOTE — PATIENT INSTRUCTIONS
Do not lay down within 3 hours of eating.  Avoid spicy, greasy foods  Eat 6-8 small meals per day.  Exercise 150 minutes per week  Avoid raw fruits and vegetables  Small amount of protein and fiber at one time    Avoid caffeine, citric acid, chocolate, peppermint, and carbonated drinks  Increase fluid to 64 ounces daily  Avoid antiinflammatory medications such as motrin, advil, aleve, ibuprofen, and BC powder

## 2022-10-24 ENCOUNTER — OFFICE VISIT (OUTPATIENT)
Dept: FAMILY MEDICINE | Facility: CLINIC | Age: 77
End: 2022-10-24
Payer: MEDICARE

## 2022-10-24 VITALS
SYSTOLIC BLOOD PRESSURE: 120 MMHG | HEIGHT: 66 IN | HEART RATE: 65 BPM | WEIGHT: 151.5 LBS | DIASTOLIC BLOOD PRESSURE: 68 MMHG | RESPIRATION RATE: 18 BRPM | OXYGEN SATURATION: 95 % | BODY MASS INDEX: 24.35 KG/M2 | TEMPERATURE: 97 F

## 2022-10-24 DIAGNOSIS — Z23 NEED FOR VACCINATION: ICD-10-CM

## 2022-10-24 DIAGNOSIS — J01.90 ACUTE SINUSITIS, RECURRENCE NOT SPECIFIED, UNSPECIFIED LOCATION: Primary | ICD-10-CM

## 2022-10-24 DIAGNOSIS — R51.9 OCCIPITAL HEADACHE: ICD-10-CM

## 2022-10-24 PROCEDURE — 99213 OFFICE O/P EST LOW 20 MIN: CPT | Mod: 25,,, | Performed by: NURSE PRACTITIONER

## 2022-10-24 PROCEDURE — 3074F SYST BP LT 130 MM HG: CPT | Mod: ,,, | Performed by: NURSE PRACTITIONER

## 2022-10-24 PROCEDURE — 3288F PR FALLS RISK ASSESSMENT DOCUMENTED: ICD-10-PCS | Mod: ,,, | Performed by: NURSE PRACTITIONER

## 2022-10-24 PROCEDURE — G0008 FLU VACCINE - QUADRIVALENT - ADJUVANTED: ICD-10-PCS | Mod: ,,, | Performed by: NURSE PRACTITIONER

## 2022-10-24 PROCEDURE — 90694 FLU VACCINE - QUADRIVALENT - ADJUVANTED: ICD-10-PCS | Mod: ,,, | Performed by: NURSE PRACTITIONER

## 2022-10-24 PROCEDURE — 90732 PNEUMOCOCCAL POLYSACCHARIDE VACCINE 23-VALENT =>2YO SQ IM: ICD-10-PCS | Mod: ,,, | Performed by: NURSE PRACTITIONER

## 2022-10-24 PROCEDURE — 1160F PR REVIEW ALL MEDS BY PRESCRIBER/CLIN PHARMACIST DOCUMENTED: ICD-10-PCS | Mod: ,,, | Performed by: NURSE PRACTITIONER

## 2022-10-24 PROCEDURE — 1101F PR PT FALLS ASSESS DOC 0-1 FALLS W/OUT INJ PAST YR: ICD-10-PCS | Mod: ,,, | Performed by: NURSE PRACTITIONER

## 2022-10-24 PROCEDURE — G0009 ADMIN PNEUMOCOCCAL VACCINE: HCPCS | Mod: ,,, | Performed by: NURSE PRACTITIONER

## 2022-10-24 PROCEDURE — 90694 VACC AIIV4 NO PRSRV 0.5ML IM: CPT | Mod: ,,, | Performed by: NURSE PRACTITIONER

## 2022-10-24 PROCEDURE — 99213 PR OFFICE/OUTPT VISIT, EST, LEVL III, 20-29 MIN: ICD-10-PCS | Mod: 25,,, | Performed by: NURSE PRACTITIONER

## 2022-10-24 PROCEDURE — 1159F PR MEDICATION LIST DOCUMENTED IN MEDICAL RECORD: ICD-10-PCS | Mod: ,,, | Performed by: NURSE PRACTITIONER

## 2022-10-24 PROCEDURE — 3288F FALL RISK ASSESSMENT DOCD: CPT | Mod: ,,, | Performed by: NURSE PRACTITIONER

## 2022-10-24 PROCEDURE — 90732 PPSV23 VACC 2 YRS+ SUBQ/IM: CPT | Mod: ,,, | Performed by: NURSE PRACTITIONER

## 2022-10-24 PROCEDURE — G0009 PNEUMOCOCCAL POLYSACCHARIDE VACCINE 23-VALENT =>2YO SQ IM: ICD-10-PCS | Mod: ,,, | Performed by: NURSE PRACTITIONER

## 2022-10-24 PROCEDURE — 3074F PR MOST RECENT SYSTOLIC BLOOD PRESSURE < 130 MM HG: ICD-10-PCS | Mod: ,,, | Performed by: NURSE PRACTITIONER

## 2022-10-24 PROCEDURE — 1125F AMNT PAIN NOTED PAIN PRSNT: CPT | Mod: ,,, | Performed by: NURSE PRACTITIONER

## 2022-10-24 PROCEDURE — 3078F PR MOST RECENT DIASTOLIC BLOOD PRESSURE < 80 MM HG: ICD-10-PCS | Mod: ,,, | Performed by: NURSE PRACTITIONER

## 2022-10-24 PROCEDURE — 1125F PR PAIN SEVERITY QUANTIFIED, PAIN PRESENT: ICD-10-PCS | Mod: ,,, | Performed by: NURSE PRACTITIONER

## 2022-10-24 PROCEDURE — 3078F DIAST BP <80 MM HG: CPT | Mod: ,,, | Performed by: NURSE PRACTITIONER

## 2022-10-24 PROCEDURE — G0008 ADMIN INFLUENZA VIRUS VAC: HCPCS | Mod: ,,, | Performed by: NURSE PRACTITIONER

## 2022-10-24 PROCEDURE — 1159F MED LIST DOCD IN RCRD: CPT | Mod: ,,, | Performed by: NURSE PRACTITIONER

## 2022-10-24 PROCEDURE — 1160F RVW MEDS BY RX/DR IN RCRD: CPT | Mod: ,,, | Performed by: NURSE PRACTITIONER

## 2022-10-24 PROCEDURE — 1101F PT FALLS ASSESS-DOCD LE1/YR: CPT | Mod: ,,, | Performed by: NURSE PRACTITIONER

## 2022-10-24 RX ORDER — AZITHROMYCIN 250 MG/1
TABLET, FILM COATED ORAL
Qty: 6 TABLET | Refills: 0 | Status: SHIPPED | OUTPATIENT
Start: 2022-10-24 | End: 2022-10-29

## 2022-10-24 NOTE — PROGRESS NOTES
"[facility]  Chief Complaint      Chief Complaint   Patient presents with    Follow-up     Patient is here for a one month follow up.    Headache     She reports having seen neurologist and headaches  have improved    Sinusitis     Reports several week history of sinus drainage yellow in color.        History of Present Illness      Fatmata Tolbert is a 77 y.o. female with chronic conditions of Hypertension, DM Type 2, Allergic Rhinitis, Obstructive Sleep Apnea, Cardiomyopathy, GERD, Hyperlipidemia, Osteoarthritis Multiple Joints, and Polyneuropathy who presents today for 1 month follow up. Ms. Tolbert has been evaluated by neurology since last visit for her headaches. She reports headaches have improved. She c/o sinus infection "I think I picked up from my CPAP". She reports having yellow sinus drainage x several weeks.     Past Medical History:  Past Medical History:   Diagnosis Date    Acute superficial gastritis without hemorrhage 7/14/2022    Anxiety     Cardiomyopathy     Diabetes mellitus, type 2     Diabetes, polyneuropathy     DJD (degenerative joint disease), multiple sites     Duodenitis 7/14/2022    GERD (gastroesophageal reflux disease)     HH (hiatus hernia) 7/14/2022    HTN (hypertension)     Hyperlipidemia     ANNA (obstructive sleep apnea)     Pseudophakia 02/09/2021       Past Surgical History:   has a past surgical history that includes Breast biopsy and Hysterectomy.    Social History:  Social History     Tobacco Use    Smoking status: Never    Smokeless tobacco: Never   Substance Use Topics    Alcohol use: Never    Drug use: Never       I personally reviewed all past medical, surgical, and social.     Review of Systems   Constitutional:  Negative for chills and fever.   HENT:  Positive for congestion, postnasal drip, rhinorrhea and sneezing. Negative for sore throat.    Respiratory:  Negative for cough and shortness of breath.    Cardiovascular:  Negative for chest pain. " "  Gastrointestinal:  Positive for constipation ("my GI doctor told me to get some Miralax") and diarrhea ("I have diarrhea all the time" she reprots this is nothing new). Negative for abdominal pain.   Genitourinary:  Negative for dysuria, frequency and urgency.   Musculoskeletal:  Positive for back pain and neck stiffness. Negative for neck pain.   Neurological:  Positive for headaches. Negative for dizziness.   Psychiatric/Behavioral:  Negative for dysphoric mood and sleep disturbance. The patient is not nervous/anxious.       Medications:  Outpatient Encounter Medications as of 10/24/2022   Medication Sig Dispense Refill    ACCU-CHEK GUIDE GLUCOSE METER Misc 1 strip by Misc.(Non-Drug; Combo Route) route once daily.      ACCU-CHEK SOFTCLIX LANCETS Misc 1 lancet by Misc.(Non-Drug; Combo Route) route once daily. Use to checks usgar one x day. 100 each 3    amLODIPine (NORVASC) 5 MG tablet Take 5 mg by mouth.      aspirin (ECOTRIN) 81 MG EC tablet Take 81 mg by mouth once daily.      blood sugar diagnostic Strp 1 each by Misc.(Non-Drug; Combo Route) route once daily. (Patient taking differently: 1 each by Misc.(Non-Drug; Combo Route) route once daily. FOR ACCUCHECK METER, use to check sugar 1 x day.) 100 strip 3    cholecalciferol, vitamin D3, 125 mcg (5,000 unit) Tab Take 5,000 Units by mouth once daily.      famotidine (PEPCID) 20 MG tablet Take 1 tablet (20 mg total) by mouth 2 (two) times daily. 180 tablet 3    fluticasone propionate (FLONASE) 50 mcg/actuation nasal spray 1 spray (50 mcg total) by Each Nostril route once daily. 9.9 mL 3    hydrOXYzine pamoate (VISTARIL) 25 MG Cap Take one capsule twice a day as needed for headache, no more than two in a day or three days in a week 30 capsule 2    Lactobacillus rhamnosus GG (CULTURELLE) 10 billion cell capsule Take 1 capsule by mouth once daily.      SORAYA BIOTIN ORAL Take 3,000 mcg by mouth 2 (two) times a day.      metoprolol succinate (TOPROL-XL) 100 MG 24 hr " tablet Take 100 mg by mouth once daily.      mirtazapine (REMERON) 7.5 MG Tab Take 1 tablet (7.5 mg total) by mouth every evening. 90 tablet 3    omega-3 fatty acids/fish oil (FISH OIL-OMEGA-3 FATTY ACIDS) 300-1,000 mg capsule Take by mouth once daily.      ondansetron (ZOFRAN) 4 MG tablet Take 1 tablet (4 mg total) by mouth every 6 (six) hours as needed for Nausea. 30 tablet 2    pravastatin (PRAVACHOL) 20 MG tablet Take 1 tablet (20 mg total) by mouth nightly. 90 tablet 1    SITagliptan-metformin (JANUMET XR) 50-1,000 mg TM24 Take 2 tablets in the am po 180 tablet 3    venlafaxine (EFFEXOR-XR) 37.5 MG 24 hr capsule Take 37.5 mg by mouth once daily.      [DISCONTINUED] cetirizine (ZYRTEC) 10 MG tablet Take 10 mg by mouth once daily.      azithromycin (Z-SHELBY) 250 MG tablet Take 2 tablets by mouth on day 1; Take 1 tablet by mouth on days 2-5 6 tablet 0    lisinopriL (PRINIVIL,ZESTRIL) 40 MG tablet Take 40 mg by mouth.      [DISCONTINUED] butalbitaL-acetaminop-caf-cod -55-30 mg Cap Take by mouth.       No facility-administered encounter medications on file as of 10/24/2022.       Allergies:  Review of patient's allergies indicates:   Allergen Reactions    Augmentin [amoxicillin-pot clavulanate] Diarrhea    Clarithromycin Diarrhea       Health Maintenance:  Immunization History   Administered Date(s) Administered    COVID-19, MRNA, LN-S, PF (MODERNA FULL 0.5 ML DOSE) 02/12/2021, 03/16/2021, 11/05/2021, 06/10/2022    Influenza (FLUAD) - Quadrivalent - Adjuvanted - PF *Preferred* (65+) 10/24/2022    Influenza - Quadrivalent - High Dose - PF (65 years and older) 01/26/2022    Influenza - Trivalent (ADULT) 01/28/2016      Health Maintenance   Topic Date Due    TETANUS VACCINE  09/20/2023 (Originally 6/28/1963)    DEXA Scan  09/20/2023 (Originally 6/28/1985)    Eye Exam  09/27/2023 (Originally 2/9/2022)    Hepatitis C Screening  10/24/2023 (Originally 1945)    Hemoglobin A1c  03/21/2023    Lipid Panel  09/22/2023  "       Physical Exam      Vital Signs  Temp: 96.8 °F (36 °C)  Pulse: 65  Resp: 18  SpO2: 95 %  BP: 120/68  BP Location: Right arm  Patient Position: Sitting  Pain Score:   3  Pain Loc: Head  Height and Weight  Height: 5' 6" (167.6 cm)  Weight: 68.7 kg (151 lb 8 oz)  BSA (Calculated - sq m): 1.79 sq meters  BMI (Calculated): 24.5  Weight in (lb) to have BMI = 25: 154.6]    Physical Exam  Constitutional:       Appearance: Normal appearance.   HENT:      Head: Normocephalic.      Right Ear: Tympanic membrane normal.      Left Ear: Tympanic membrane normal.   Cardiovascular:      Rate and Rhythm: Normal rate and regular rhythm.      Pulses: Normal pulses.      Heart sounds: Normal heart sounds.   Pulmonary:      Effort: Pulmonary effort is normal.      Breath sounds: Normal breath sounds.   Abdominal:      General: Bowel sounds are normal.      Palpations: Abdomen is soft.   Musculoskeletal:         General: Normal range of motion.      Cervical back: Normal range of motion.   Skin:     General: Skin is warm and dry.   Neurological:      Mental Status: She is alert and oriented to person, place, and time.   Psychiatric:         Mood and Affect: Mood normal.         Behavior: Behavior normal.        Laboratory:  CBC:  Recent Labs   Lab 07/02/21  1138 02/21/22  0832 06/07/22  0928 09/28/22  0712   WBC 4.89 5.31  --  5.57   RBC 4.09 L 4.00 L  --  4.02 L   Hemoglobin 11.2 L 11.5 L   < > 11.7 L   Hematocrit 35.8 L 35.4 L   < > 36.0 L   Platelet Count 218 186  --  221   MCV 87.5 88.5  --  89.6   MCH 27.4 28.8  --  29.1   MCHC 31.3 L 32.5  --  32.5    < > = values in this interval not displayed.     CMP:  Recent Labs   Lab 07/02/21  1138 02/21/22  0832 09/22/22  0715 09/28/22  0712   Glucose 96   < > 115 H 135 H   Calcium 9.2   < > 9.7 9.5   Albumin 3.8  --  3.7 3.8   Total Protein 7.0   < > 7.1 6.8   Sodium 143   < > 141 138   Potassium 4.7   < > 4.2 4.7   CO2 33 H   < > 28 30   Chloride 108 H   < > 109 H 104   BUN 23 H   < > " 18 18   Alk Phos 49 L  --  50 L 51 L   ALT 21  --  26 22   AST 14 L  --  12 L 15   Bilirubin, Total 0.4  --  0.3 0.4    < > = values in this interval not displayed.     LIPIDS:  Recent Labs   Lab 10/26/21  0920 09/22/22  0715 09/28/22  0712   TSH  --   --  0.587   HDL Cholesterol 71 H 73 H  --    Cholesterol 138 157  --    Triglycerides 74 61  --    LDL Calculated 52 72  --    Cholesterol/HDL Ratio (Risk Factor) 1.9 2.2  --    Non-HDL 67 84  --      TSH:  Recent Labs   Lab 09/28/22  0712   TSH 0.587     A1C:  Recent Labs   Lab 06/16/21  1333 10/26/21  0920 01/26/22  0905 09/21/22  0852   Hemoglobin A1C 6.6 6.6 6.4 6.4       Assessment/Plan      1. Acute sinusitis, recurrence not specified, unspecified location  - azithromycin (Z-SHELBY) 250 MG tablet; Take 2 tablets by mouth on day 1; Take 1 tablet by mouth on days 2-5  Dispense: 6 tablet; Refill: 0    2. Need for vaccination  - Influenza (FLUAD) - Quadrivalent (Adjuvanted) *Preferred* (65+) (PF)  - (In Office Administered) Pneumococcal Polysaccharide Vaccine (23 Valent) (SQ/IM)    3. Occipital headache   -improved  -follow up with neurology as directed    Return to clinic in 3 months.    ERLINDA Nguyen-[facility]

## 2022-11-01 NOTE — PROGRESS NOTES
Subjective:       Patient ID: Fatmata Tolbert is a 77 y.o. female.    Chief Complaint:  No chief complaint on file.      History of Present Illness  This pleasant 77 year old right handed  female presents to the clinic for follow up of headaches and paresthesia. Patient states headaches and paresthesia are greatly improved. She is pleased with the headaches and paresthesia treatment and desires to continue the current management. She still reports a daily headache that she now rates as a 2 on a scale of 0 to 10 with burning, tingling and aching pain. Headaches are in the occipital area. Headache trigger is unknown and she denies any aura. She states the headache had a gradual onset with photophobia. Resting in a quiet dark room makes the headaches better while lights make the headaches worse. She last seen the eye doctor in June 2022 and has another follow up in December 2022 due to diabetes. She denies any neck or head injury. She is no longer taking anything over the counter for the headaches. Discussed the rebound headache again in detail.  She is on metoprolol  mg daily and I discussed with the patient that this is a beta blocker and will help prevent the headaches.  She has Vistaril 25 mg one twice a day as needed for the headache, no more than 3 days a week. She is tolerating these medications well. She does have ANNA and reports compliance with wearing the Cpap. She denies kidney stones or glaucoma. Her family medical history includes CVA, HTN, prostate cancer, breast cancer, headaches  and brain tumor. Her PMH includes DM, anxiety, HTN, chest pain, Heart failure, high cholesterol, C-diff, anemia, GERD, gastroparesis, DJD, ANNA, and heart ablation. She denies smoking or drinking alcohol and states she had a total hysterectomy in the past. She desires not to add or adjust any medication at this time since she has had such an improvement so far with the headache and paresthesia management. She  desires to give it more time. Discussed the plan in detail with the patient and she is in agreement with the plan. All her questions were answered at today's visit.      MRI of the brain without contrast done on September 9, 2022 at [facility] showed no convincing imaging evidence of acute intracranial abnormality. Probable mild chronic microvascular ischemic changes.      CT of the head without contrast done on August 30, 2022 showed no evidence of abnormality demonstrated.           Review of Systems  Review of Systems   Constitutional:  Negative for activity change, diaphoresis, fever and unexpected weight change.   HENT:  Negative for congestion, ear pain, facial swelling, hearing loss, tinnitus, trouble swallowing and voice change.    Eyes:  Negative for photophobia, pain and visual disturbance.   Respiratory:  Negative for chest tightness, shortness of breath and wheezing.    Cardiovascular:  Negative for chest pain, palpitations and leg swelling.   Gastrointestinal:  Negative for constipation, diarrhea, nausea and vomiting.   Genitourinary:  Negative for difficulty urinating.   Musculoskeletal:  Negative for back pain, gait problem, neck pain and neck stiffness.   Skin:  Negative for color change, pallor, rash and wound.   Neurological:  Positive for numbness and headaches. Negative for dizziness, tremors, seizures, syncope, facial asymmetry, speech difficulty, weakness and light-headedness.   Psychiatric/Behavioral:  Negative for agitation, behavioral problems, confusion, decreased concentration and hallucinations. The patient is not nervous/anxious and is not hyperactive.      Objective:      Neurologic Exam     Mental Status   Oriented to person, place, and time.   Oriented to person.   Oriented to place.   Oriented to time.   Attention: normal. Concentration: normal.   Speech: speech is normal   Level of consciousness: alert  Knowledge: good.     Cranial Nerves     CN II   Visual fields full to  confrontation.     CN III, IV, VI   Pupils are equal, round, and reactive to light.  Extraocular motions are normal.   Right pupil: Size: 3 mm. Shape: regular. Reactivity: brisk.   Left pupil: Size: 3 mm. Shape: regular. Reactivity: brisk.   CN III: no CN III palsy  CN VI: no CN VI palsy    CN V   Facial sensation intact.     CN VII   Facial expression full, symmetric.     CN VIII   CN VIII normal.   Hearing: intact    CN IX, X   CN IX normal.   CN X normal.     CN XI   CN XI normal.     CN XII   CN XII normal.     Motor Exam   Muscle bulk: normal  Overall muscle tone: normal  Right arm pronator drift: absent  Left arm pronator drift: absent    Strength   Right deltoid: 5/5  Left deltoid: 5/5  Right biceps: 5/5  Left biceps: 5/5  Right triceps: 5/5  Left triceps: 5/5  Right quadriceps: 5/5  Left quadriceps: 5/5  Right hamstrin/5  Left hamstrin/5    Sensory Exam   Light touch normal.     Gait, Coordination, and Reflexes     Gait  Gait: normal    Coordination   Finger to nose coordination: normal    Tremor   Resting tremor: absent  Intention tremor: absent  Action tremor: absent    Reflexes   Right brachioradialis: 2+  Left brachioradialis: 2+  Right biceps: 2+  Left biceps: 2+  Right triceps: 2+  Left triceps: 2+  Right patellar: 2+  Left patellar: 2+  Right achilles: 2+  Left achilles: 2+  Right : 4+  Left : 4+    Physical Exam  Constitutional:       General: She is not in acute distress.  HENT:      Head: Normocephalic.      Nose: Nose normal.      Mouth/Throat:      Mouth: Mucous membranes are moist.   Eyes:      Extraocular Movements: Extraocular movements intact and EOM normal.      Pupils: Pupils are equal, round, and reactive to light.   Cardiovascular:      Rate and Rhythm: Normal rate and regular rhythm.      Heart sounds: Normal heart sounds. No murmur heard.  Pulmonary:      Effort: Pulmonary effort is normal. No respiratory distress.      Breath sounds: Normal breath sounds. No wheezing,  rhonchi or rales.   Musculoskeletal:         General: No swelling, tenderness, deformity or signs of injury. Normal range of motion.      Cervical back: Normal range of motion. No rigidity or tenderness.      Right lower leg: No edema.      Left lower leg: No edema.   Skin:     General: Skin is warm and dry.      Capillary Refill: Capillary refill takes less than 2 seconds.      Coloration: Skin is not jaundiced or pale.      Findings: No bruising, erythema, lesion or rash.   Neurological:      Mental Status: She is alert and oriented to person, place, and time.      Coordination: Finger-Nose-Finger Test normal.      Gait: Gait is intact.      Deep Tendon Reflexes:      Reflex Scores:       Tricep reflexes are 2+ on the right side and 2+ on the left side.       Bicep reflexes are 2+ on the right side and 2+ on the left side.       Brachioradialis reflexes are 2+ on the right side and 2+ on the left side.       Patellar reflexes are 2+ on the right side and 2+ on the left side.       Achilles reflexes are 2+ on the right side and 2+ on the left side.  Psychiatric:         Mood and Affect: Mood normal.         Speech: Speech normal.         Behavior: Behavior normal.         Assessment:     Problem List Items Addressed This Visit          Neuro    Occipital headache - Primary    Rebound headache       Palliative Care    On long term drug therapy       Other    Sleep apnea         Plan:       Continue current headache management  Continue Headache diary   Continue Cpap as prescribed  4.   Continue Vistaril 25 mg take one tablet twice a day AS NEEDED, no more than two in a day or two days in a week  5.   Follow up with neurology in two months or sooner if needed

## 2022-11-02 ENCOUNTER — OFFICE VISIT (OUTPATIENT)
Dept: NEUROLOGY | Facility: CLINIC | Age: 77
End: 2022-11-02
Payer: MEDICARE

## 2022-11-02 VITALS
HEIGHT: 66 IN | HEART RATE: 70 BPM | WEIGHT: 153 LBS | OXYGEN SATURATION: 95 % | BODY MASS INDEX: 24.59 KG/M2 | SYSTOLIC BLOOD PRESSURE: 110 MMHG | DIASTOLIC BLOOD PRESSURE: 60 MMHG

## 2022-11-02 DIAGNOSIS — G47.30 SLEEP APNEA, UNSPECIFIED TYPE: ICD-10-CM

## 2022-11-02 DIAGNOSIS — Z79.899 ON LONG TERM DRUG THERAPY: ICD-10-CM

## 2022-11-02 DIAGNOSIS — G44.40 REBOUND HEADACHE: ICD-10-CM

## 2022-11-02 DIAGNOSIS — R51.9 OCCIPITAL HEADACHE: Primary | ICD-10-CM

## 2022-11-02 PROCEDURE — 1159F PR MEDICATION LIST DOCUMENTED IN MEDICAL RECORD: ICD-10-PCS | Mod: CPTII,,, | Performed by: NURSE PRACTITIONER

## 2022-11-02 PROCEDURE — 1101F PR PT FALLS ASSESS DOC 0-1 FALLS W/OUT INJ PAST YR: ICD-10-PCS | Mod: CPTII,,, | Performed by: NURSE PRACTITIONER

## 2022-11-02 PROCEDURE — 3074F SYST BP LT 130 MM HG: CPT | Mod: CPTII,,, | Performed by: NURSE PRACTITIONER

## 2022-11-02 PROCEDURE — 99213 PR OFFICE/OUTPT VISIT, EST, LEVL III, 20-29 MIN: ICD-10-PCS | Mod: S$PBB,,, | Performed by: NURSE PRACTITIONER

## 2022-11-02 PROCEDURE — 99213 OFFICE O/P EST LOW 20 MIN: CPT | Mod: S$PBB,,, | Performed by: NURSE PRACTITIONER

## 2022-11-02 PROCEDURE — 1160F RVW MEDS BY RX/DR IN RCRD: CPT | Mod: CPTII,,, | Performed by: NURSE PRACTITIONER

## 2022-11-02 PROCEDURE — 99215 OFFICE O/P EST HI 40 MIN: CPT | Mod: PBBFAC | Performed by: NURSE PRACTITIONER

## 2022-11-02 PROCEDURE — 1159F MED LIST DOCD IN RCRD: CPT | Mod: CPTII,,, | Performed by: NURSE PRACTITIONER

## 2022-11-02 PROCEDURE — 3078F DIAST BP <80 MM HG: CPT | Mod: CPTII,,, | Performed by: NURSE PRACTITIONER

## 2022-11-02 PROCEDURE — 1101F PT FALLS ASSESS-DOCD LE1/YR: CPT | Mod: CPTII,,, | Performed by: NURSE PRACTITIONER

## 2022-11-02 PROCEDURE — 3288F FALL RISK ASSESSMENT DOCD: CPT | Mod: CPTII,,, | Performed by: NURSE PRACTITIONER

## 2022-11-02 PROCEDURE — 3078F PR MOST RECENT DIASTOLIC BLOOD PRESSURE < 80 MM HG: ICD-10-PCS | Mod: CPTII,,, | Performed by: NURSE PRACTITIONER

## 2022-11-02 PROCEDURE — 1160F PR REVIEW ALL MEDS BY PRESCRIBER/CLIN PHARMACIST DOCUMENTED: ICD-10-PCS | Mod: CPTII,,, | Performed by: NURSE PRACTITIONER

## 2022-11-02 PROCEDURE — 3288F PR FALLS RISK ASSESSMENT DOCUMENTED: ICD-10-PCS | Mod: CPTII,,, | Performed by: NURSE PRACTITIONER

## 2022-11-02 PROCEDURE — 3074F PR MOST RECENT SYSTOLIC BLOOD PRESSURE < 130 MM HG: ICD-10-PCS | Mod: CPTII,,, | Performed by: NURSE PRACTITIONER

## 2022-11-02 NOTE — PATIENT INSTRUCTIONS
Continue current headache management  Continue Headache diary   Continue Cpap as prescribed  4.   Continue Vistaril 25 mg take one tablet twice a day AS NEEDED, no more than two in a day or two days in a week  5.   Follow up with neurology in two months or sooner if needed

## 2022-11-09 DIAGNOSIS — Z71.89 COMPLEX CARE COORDINATION: ICD-10-CM

## 2023-01-09 DIAGNOSIS — E78.5 HYPERLIPIDEMIA, UNSPECIFIED HYPERLIPIDEMIA TYPE: ICD-10-CM

## 2023-01-09 NOTE — PROGRESS NOTES
Subjective:       Patient ID: Fatmata Tolbert is a 77 y.o. female.    Chief Complaint:  No chief complaint on file.      History of Present Illness  This pleasant 77 year old right handed  female presents to the clinic for follow up of headaches and paresthesia. Patient states she is doing well today. Patient states headaches and paresthesia are greatly improved but still reports a daily headache that she now rates as a 2 to 4 on a scale of 0 to 10 with burning, tingling and aching pain. Headaches are in the occipital area. Headache trigger is unknown and she denies any aura. She states the headache had a gradual onset with photophobia. Resting in a quiet dark room makes the headaches better while lights make the headaches worse. She last seen the eye doctor in December 2022 and has another follow in May 2023 due to diabetes. She denies any neck or head injury. She is no longer taking anything over the counter for the headaches. Discussed the rebound headache again in detail.  She is on metoprolol  mg daily and I discussed with the patient that this is a beta blocker and will help prevent the headaches.  She has Vistaril 25 mg one twice a day as needed for the headache, no more than 3 days a week. She is tolerating these medications without side effects. Discussed adding gabapentin 100 mg at bedtime and the side effects of this medication. She is agreeable to this. Discussed that if the gabapentin does not help her headaches then we would consider the Tegretol or Occipital Nerve Block. She does have ANNA and reports compliance with wearing the Cpap. She denies kidney stones or glaucoma. Her family medical history includes CVA, HTN, prostate cancer, breast cancer, headaches  and brain tumor. Her PMH includes DM, anxiety, HTN, chest pain, Heart failure, high cholesterol, C-diff, anemia, GERD, gastroparesis, DJD, ANNA, and heart ablation. She denies smoking or drinking alcohol and states she had a  total hysterectomy in the past.  Discussed the plan in detail with the patient and she is in agreement with the plan. All her questions were answered at today's visit.      MRI of the brain without contrast done on September 9, 2022 at [facility] showed no convincing imaging evidence of acute intracranial abnormality. Probable mild chronic microvascular ischemic changes.      CT of the head without contrast done on August 30, 2022 showed no evidence of abnormality demonstrated.              Review of Systems  Review of Systems   Constitutional:  Negative for activity change, diaphoresis, fever and unexpected weight change.   HENT:  Negative for congestion, ear pain, facial swelling, hearing loss, tinnitus, trouble swallowing and voice change.    Eyes:  Negative for photophobia, pain and visual disturbance.   Respiratory:  Negative for chest tightness, shortness of breath and wheezing.    Cardiovascular:  Negative for chest pain, palpitations and leg swelling.   Gastrointestinal:  Negative for constipation, diarrhea, nausea and vomiting.   Genitourinary:  Negative for difficulty urinating.   Musculoskeletal:  Negative for back pain, gait problem, neck pain and neck stiffness.   Skin:  Negative for color change, pallor, rash and wound.   Neurological:  Positive for numbness and headaches. Negative for dizziness, tremors, seizures, syncope, facial asymmetry, speech difficulty, weakness and light-headedness.   Psychiatric/Behavioral:  Negative for agitation, behavioral problems, confusion, decreased concentration and hallucinations. The patient is not nervous/anxious and is not hyperactive.      Objective:      Neurologic Exam     Mental Status   Oriented to person, place, and time.   Oriented to person.   Oriented to place.   Oriented to time.   Attention: normal. Concentration: normal.   Speech: speech is normal   Level of consciousness: alert  Knowledge: good.     Cranial Nerves     CN II   Visual fields full to  confrontation.     CN III, IV, VI   Pupils are equal, round, and reactive to light.  Extraocular motions are normal.   Right pupil: Size: 3 mm. Shape: regular. Reactivity: brisk.   Left pupil: Size: 3 mm. Shape: regular. Reactivity: brisk.   CN III: no CN III palsy  CN VI: no CN VI palsy    CN V   Facial sensation intact.     CN VII   Facial expression full, symmetric.     CN VIII   CN VIII normal.   Hearing: intact    CN IX, X   CN IX normal.   CN X normal.     CN XI   CN XI normal.     CN XII   CN XII normal.     Motor Exam   Muscle bulk: normal  Overall muscle tone: normal  Right arm pronator drift: absent  Left arm pronator drift: absent    Strength   Right deltoid: 5/5  Left deltoid: 5/5  Right biceps: 5/5  Left biceps: 5/5  Right triceps: 5/5  Left triceps: 5/5  Right quadriceps: 5/5  Left quadriceps: 5/5  Right hamstrin/5  Left hamstrin/5    Sensory Exam   Light touch normal.     Gait, Coordination, and Reflexes     Gait  Gait: normal    Coordination   Finger to nose coordination: normal    Tremor   Resting tremor: absent  Intention tremor: absent  Action tremor: absent    Reflexes   Right brachioradialis: 2+  Left brachioradialis: 2+  Right biceps: 2+  Left biceps: 2+  Right triceps: 2+  Left triceps: 2+  Right patellar: 2+  Left patellar: 2+  Right achilles: 2+  Left achilles: 2+  Right : 4+  Left : 4+    Physical Exam  Constitutional:       General: She is not in acute distress.  HENT:      Head: Normocephalic.      Nose: Nose normal.      Mouth/Throat:      Mouth: Mucous membranes are moist.   Eyes:      Extraocular Movements: Extraocular movements intact and EOM normal.      Pupils: Pupils are equal, round, and reactive to light.   Cardiovascular:      Rate and Rhythm: Normal rate and regular rhythm.      Heart sounds: Normal heart sounds. No murmur heard.  Pulmonary:      Effort: Pulmonary effort is normal. No respiratory distress.      Breath sounds: Normal breath sounds. No wheezing,  rhonchi or rales.   Musculoskeletal:         General: No swelling, tenderness, deformity or signs of injury. Normal range of motion.      Cervical back: Normal range of motion. No rigidity or tenderness.      Right lower leg: No edema.      Left lower leg: No edema.   Skin:     General: Skin is warm and dry.      Capillary Refill: Capillary refill takes less than 2 seconds.      Coloration: Skin is not jaundiced or pale.      Findings: No bruising, erythema, lesion or rash.   Neurological:      Mental Status: She is alert and oriented to person, place, and time.      Coordination: Finger-Nose-Finger Test normal.      Gait: Gait is intact.      Deep Tendon Reflexes:      Reflex Scores:       Tricep reflexes are 2+ on the right side and 2+ on the left side.       Bicep reflexes are 2+ on the right side and 2+ on the left side.       Brachioradialis reflexes are 2+ on the right side and 2+ on the left side.       Patellar reflexes are 2+ on the right side and 2+ on the left side.       Achilles reflexes are 2+ on the right side and 2+ on the left side.  Psychiatric:         Mood and Affect: Mood normal.         Speech: Speech normal.         Behavior: Behavior normal.         Assessment:     Problem List Items Addressed This Visit          Neuro    Occipital headache - Primary    Relevant Medications    gabapentin (NEURONTIN) 100 MG capsule    Rebound headache       Palliative Care    On long term drug therapy       Other    Sleep apnea         Plan:     Rx Gabapentin 100 mg one at bedtime, discussed side effects   Continue Headache diary   Continue Cpap as prescribed  4.   Continue Vistaril 25 mg take one tablet twice a day AS NEEDED, no more than two in a day or two days in a week   5.   Consider occipital nerve block with pain treatment if needed  6.   Consider Tegretol if needed    5.   Follow up with neurology in two months or sooner if needed

## 2023-01-11 RX ORDER — PRAVASTATIN SODIUM 20 MG/1
20 TABLET ORAL NIGHTLY
Qty: 90 TABLET | Refills: 1 | Status: SHIPPED | OUTPATIENT
Start: 2023-01-11 | End: 2023-01-25 | Stop reason: SDUPTHER

## 2023-01-12 ENCOUNTER — OFFICE VISIT (OUTPATIENT)
Dept: NEUROLOGY | Facility: CLINIC | Age: 78
End: 2023-01-12
Payer: MEDICARE

## 2023-01-12 VITALS
BODY MASS INDEX: 23.3 KG/M2 | OXYGEN SATURATION: 97 % | WEIGHT: 145 LBS | HEIGHT: 66 IN | HEART RATE: 68 BPM | DIASTOLIC BLOOD PRESSURE: 60 MMHG | SYSTOLIC BLOOD PRESSURE: 114 MMHG

## 2023-01-12 DIAGNOSIS — R51.9 OCCIPITAL HEADACHE: Primary | ICD-10-CM

## 2023-01-12 DIAGNOSIS — G44.40 REBOUND HEADACHE: ICD-10-CM

## 2023-01-12 DIAGNOSIS — Z79.899 ON LONG TERM DRUG THERAPY: ICD-10-CM

## 2023-01-12 DIAGNOSIS — G47.30 SLEEP APNEA, UNSPECIFIED TYPE: ICD-10-CM

## 2023-01-12 PROCEDURE — 3074F PR MOST RECENT SYSTOLIC BLOOD PRESSURE < 130 MM HG: ICD-10-PCS | Mod: CPTII,,, | Performed by: NURSE PRACTITIONER

## 2023-01-12 PROCEDURE — 3288F PR FALLS RISK ASSESSMENT DOCUMENTED: ICD-10-PCS | Mod: CPTII,,, | Performed by: NURSE PRACTITIONER

## 2023-01-12 PROCEDURE — 1159F MED LIST DOCD IN RCRD: CPT | Mod: CPTII,,, | Performed by: NURSE PRACTITIONER

## 2023-01-12 PROCEDURE — 1160F RVW MEDS BY RX/DR IN RCRD: CPT | Mod: CPTII,,, | Performed by: NURSE PRACTITIONER

## 2023-01-12 PROCEDURE — 1160F PR REVIEW ALL MEDS BY PRESCRIBER/CLIN PHARMACIST DOCUMENTED: ICD-10-PCS | Mod: CPTII,,, | Performed by: NURSE PRACTITIONER

## 2023-01-12 PROCEDURE — 3074F SYST BP LT 130 MM HG: CPT | Mod: CPTII,,, | Performed by: NURSE PRACTITIONER

## 2023-01-12 PROCEDURE — 3078F DIAST BP <80 MM HG: CPT | Mod: CPTII,,, | Performed by: NURSE PRACTITIONER

## 2023-01-12 PROCEDURE — 3288F FALL RISK ASSESSMENT DOCD: CPT | Mod: CPTII,,, | Performed by: NURSE PRACTITIONER

## 2023-01-12 PROCEDURE — 1101F PT FALLS ASSESS-DOCD LE1/YR: CPT | Mod: CPTII,,, | Performed by: NURSE PRACTITIONER

## 2023-01-12 PROCEDURE — 3078F PR MOST RECENT DIASTOLIC BLOOD PRESSURE < 80 MM HG: ICD-10-PCS | Mod: CPTII,,, | Performed by: NURSE PRACTITIONER

## 2023-01-12 PROCEDURE — 99213 OFFICE O/P EST LOW 20 MIN: CPT | Mod: PBBFAC | Performed by: NURSE PRACTITIONER

## 2023-01-12 PROCEDURE — 99213 OFFICE O/P EST LOW 20 MIN: CPT | Mod: S$PBB,,, | Performed by: NURSE PRACTITIONER

## 2023-01-12 PROCEDURE — 1159F PR MEDICATION LIST DOCUMENTED IN MEDICAL RECORD: ICD-10-PCS | Mod: CPTII,,, | Performed by: NURSE PRACTITIONER

## 2023-01-12 PROCEDURE — 99213 PR OFFICE/OUTPT VISIT, EST, LEVL III, 20-29 MIN: ICD-10-PCS | Mod: S$PBB,,, | Performed by: NURSE PRACTITIONER

## 2023-01-12 PROCEDURE — 1101F PR PT FALLS ASSESS DOC 0-1 FALLS W/OUT INJ PAST YR: ICD-10-PCS | Mod: CPTII,,, | Performed by: NURSE PRACTITIONER

## 2023-01-12 RX ORDER — GABAPENTIN 100 MG/1
CAPSULE ORAL
Qty: 90 CAPSULE | Refills: 1 | Status: SHIPPED | OUTPATIENT
Start: 2023-01-12 | End: 2023-02-22 | Stop reason: DRUGHIGH

## 2023-01-12 NOTE — PATIENT INSTRUCTIONS
Rx Gabapentin 100 mg one at bedtime, discussed side effects   Continue Headache diary   Continue Cpap as prescribed  4.   Continue Vistaril 25 mg take one tablet twice a day AS NEEDED, no more than two in a day or two days in a week   5.   Consider occipital nerve block with pain treatment if needed  6.   Consider Tegretol if needed    5.   Follow up with neurology in two months or sooner if needed

## 2023-01-25 ENCOUNTER — OFFICE VISIT (OUTPATIENT)
Dept: FAMILY MEDICINE | Facility: CLINIC | Age: 78
End: 2023-01-25
Payer: MEDICARE

## 2023-01-25 VITALS
TEMPERATURE: 99 F | WEIGHT: 147.19 LBS | HEIGHT: 66 IN | BODY MASS INDEX: 23.65 KG/M2 | RESPIRATION RATE: 18 BRPM | OXYGEN SATURATION: 99 % | DIASTOLIC BLOOD PRESSURE: 60 MMHG | SYSTOLIC BLOOD PRESSURE: 126 MMHG | HEART RATE: 78 BPM

## 2023-01-25 DIAGNOSIS — I10 BENIGN ESSENTIAL HYPERTENSION: ICD-10-CM

## 2023-01-25 DIAGNOSIS — F32.A DEPRESSION, UNSPECIFIED DEPRESSION TYPE: ICD-10-CM

## 2023-01-25 DIAGNOSIS — E11.9 TYPE 2 DIABETES MELLITUS WITHOUT COMPLICATION, WITHOUT LONG-TERM CURRENT USE OF INSULIN: ICD-10-CM

## 2023-01-25 DIAGNOSIS — R51.9 OCCIPITAL HEADACHE: ICD-10-CM

## 2023-01-25 DIAGNOSIS — H57.9 OCULAR PRURITUS: ICD-10-CM

## 2023-01-25 DIAGNOSIS — E78.5 HYPERLIPIDEMIA, UNSPECIFIED HYPERLIPIDEMIA TYPE: Primary | ICD-10-CM

## 2023-01-25 PROCEDURE — 1159F PR MEDICATION LIST DOCUMENTED IN MEDICAL RECORD: ICD-10-PCS | Mod: ,,, | Performed by: NURSE PRACTITIONER

## 2023-01-25 PROCEDURE — 3288F PR FALLS RISK ASSESSMENT DOCUMENTED: ICD-10-PCS | Mod: ,,, | Performed by: NURSE PRACTITIONER

## 2023-01-25 PROCEDURE — 3078F DIAST BP <80 MM HG: CPT | Mod: ,,, | Performed by: NURSE PRACTITIONER

## 2023-01-25 PROCEDURE — 3288F FALL RISK ASSESSMENT DOCD: CPT | Mod: ,,, | Performed by: NURSE PRACTITIONER

## 2023-01-25 PROCEDURE — 1160F PR REVIEW ALL MEDS BY PRESCRIBER/CLIN PHARMACIST DOCUMENTED: ICD-10-PCS | Mod: ,,, | Performed by: NURSE PRACTITIONER

## 2023-01-25 PROCEDURE — 3078F PR MOST RECENT DIASTOLIC BLOOD PRESSURE < 80 MM HG: ICD-10-PCS | Mod: ,,, | Performed by: NURSE PRACTITIONER

## 2023-01-25 PROCEDURE — 1101F PR PT FALLS ASSESS DOC 0-1 FALLS W/OUT INJ PAST YR: ICD-10-PCS | Mod: ,,, | Performed by: NURSE PRACTITIONER

## 2023-01-25 PROCEDURE — 99213 OFFICE O/P EST LOW 20 MIN: CPT | Mod: ,,, | Performed by: NURSE PRACTITIONER

## 2023-01-25 PROCEDURE — 1159F MED LIST DOCD IN RCRD: CPT | Mod: ,,, | Performed by: NURSE PRACTITIONER

## 2023-01-25 PROCEDURE — 1160F RVW MEDS BY RX/DR IN RCRD: CPT | Mod: ,,, | Performed by: NURSE PRACTITIONER

## 2023-01-25 PROCEDURE — 3074F PR MOST RECENT SYSTOLIC BLOOD PRESSURE < 130 MM HG: ICD-10-PCS | Mod: ,,, | Performed by: NURSE PRACTITIONER

## 2023-01-25 PROCEDURE — 3074F SYST BP LT 130 MM HG: CPT | Mod: ,,, | Performed by: NURSE PRACTITIONER

## 2023-01-25 PROCEDURE — 1101F PT FALLS ASSESS-DOCD LE1/YR: CPT | Mod: ,,, | Performed by: NURSE PRACTITIONER

## 2023-01-25 PROCEDURE — 99213 PR OFFICE/OUTPT VISIT, EST, LEVL III, 20-29 MIN: ICD-10-PCS | Mod: ,,, | Performed by: NURSE PRACTITIONER

## 2023-01-25 RX ORDER — PRAVASTATIN SODIUM 20 MG/1
20 TABLET ORAL NIGHTLY
Qty: 90 TABLET | Refills: 1 | Status: SHIPPED | OUTPATIENT
Start: 2023-01-25 | End: 2023-04-04 | Stop reason: SDUPTHER

## 2023-01-25 NOTE — PROGRESS NOTES
"[facility]  Chief Complaint      Chief Complaint   Patient presents with    Follow-up     Patient is here for 3 month follow up.     Facial Swelling     Patient reports having some left eye swelling and itching. Patient states that she woke up with it on Monday.        History of Present Illness      Fatmata Tolbert is a 77 y.o. female with chronic conditions of  Hypertension, DM Type 2, Allergic Rhinitis, Obstructive Sleep Apnea uses CPAP, Cardiomyopathy, GERD, Hyperlipidemia, Osteoarthritis Multiple Joints, and Polyneuropathy  who presents today for 3 month follow up. She is complaining left eye swelling and itching; she reports waking up with this on Monday.   She reports seeing Neurology since last visit and was started on Gabapentin at bedtime and reports it works better than what she previously had. She reports not having a headache since 01/12/2023. Review of Labs: 09/22/2022 Chol 157 Trig 61 LDL 73 HDL 73; Microalbumin abnormal 09/21/2022 A1c 6.4% 09/28/2022 Vitamin D 25 OH 85.1 () TSH 0.587 (0.358-3.740) BUN 18 (7-18) Creatinine  0.91 (0.55-1.02).   She does reports some depression related to her  being ill. She reports "it's all balled up in me". Discussed the need to potentially talk to someone regarding this as one day it will all come out.     Outside Providers:  Sleep Medicine: Dr. Garcia/Evangelina GARRISON  Ophthalmology: Dr. Ailin Marc  Cardiologist: Dr. Darvin Gomez @ [facility]   Diabetes NP Samantha Hooks   Gastroenterologist: MELI Romano/LISA Olvera MD    Past Medical History:  Past Medical History:   Diagnosis Date    Acute superficial gastritis without hemorrhage 7/14/2022    Anxiety     Cardiomyopathy     Diabetes mellitus, type 2     Diabetes, polyneuropathy     DJD (degenerative joint disease), multiple sites     Duodenitis 7/14/2022    GERD (gastroesophageal reflux disease)     HH (hiatus hernia) 7/14/2022    HTN (hypertension)     " "Hyperlipidemia     ANNA (obstructive sleep apnea)     Pseudophakia 02/09/2021       Past Surgical History:   has a past surgical history that includes Breast biopsy and Hysterectomy.    Social History:  Social History     Tobacco Use    Smoking status: Never    Smokeless tobacco: Never   Substance Use Topics    Alcohol use: Never    Drug use: Never       I personally reviewed all past medical, surgical, and social.     Review of Systems   Constitutional:  Positive for appetite change ("I get real nauseated" x 6 months). Negative for fever.   HENT:  Negative for congestion, rhinorrhea, sore throat and trouble swallowing.    Eyes:  Negative for visual disturbance.   Respiratory:  Positive for shortness of breath (with exertion "walking up steps"). Negative for cough.    Cardiovascular:  Negative for chest pain.   Gastrointestinal:  Positive for diarrhea ("I have diarrhea often it depends on what I eat I am lactose intolerant") and nausea. Negative for abdominal pain and constipation.   Genitourinary:  Negative for dysuria, frequency and urgency.   Musculoskeletal:  Positive for arthralgias, back pain ("the low part of my back hurts I think that is because I have a stone") and neck stiffness ("when I have a headache"). Negative for joint swelling and myalgias.   Neurological:  Positive for headaches (last headache 01/12/2023). Negative for dizziness.   Psychiatric/Behavioral:  Positive for dysphoric mood ("sometimes I get depressed" reports it related to personal issues). Negative for sleep disturbance. The patient is not nervous/anxious.       Medications:  Outpatient Encounter Medications as of 1/25/2023   Medication Sig Dispense Refill    ACCU-CHEK GUIDE GLUCOSE METER Misc 1 strip by Misc.(Non-Drug; Combo Route) route once daily.      ACCU-CHEK SOFTCLIX LANCETS Misc 1 lancet by Misc.(Non-Drug; Combo Route) route once daily. Use to checks usgar one x day. 100 each 3    alcohol swabs (BD ALCOHOL SWABS) PadM Use to " cleanse the skin prior to daily glucose check 100 each 3    amLODIPine (NORVASC) 5 MG tablet Take 5 mg by mouth.      aspirin (ECOTRIN) 81 MG EC tablet Take 81 mg by mouth once daily.      blood sugar diagnostic (ACCU-CHEK GUIDE TEST STRIPS) Strp Use to monitor glucose daily as directed. 100 strip 3    cholecalciferol, vitamin D3, 125 mcg (5,000 unit) Tab Take 5,000 Units by mouth once daily.      famotidine (PEPCID) 20 MG tablet Take 1 tablet (20 mg total) by mouth 2 (two) times daily. 180 tablet 3    fluticasone propionate (FLONASE) 50 mcg/actuation nasal spray 1 spray (50 mcg total) by Each Nostril route once daily. 9.9 mL 3    gabapentin (NEURONTIN) 100 MG capsule Take one capsule at bedtime 90 capsule 1    hydrOXYzine pamoate (VISTARIL) 25 MG Cap Take one capsule twice a day as needed for headache, no more than two in a day or three days in a week 30 capsule 2    Lactobacillus rhamnosus GG (CULTURELLE) 10 billion cell capsule Take 1 capsule by mouth once daily.      lisinopriL (PRINIVIL,ZESTRIL) 40 MG tablet Take 40 mg by mouth.      SORAYA BIOTIN ORAL Take 3,000 mcg by mouth 2 (two) times a day.      metoprolol succinate (TOPROL-XL) 100 MG 24 hr tablet Take 100 mg by mouth once daily.      mirtazapine (REMERON) 7.5 MG Tab Take 1 tablet (7.5 mg total) by mouth every evening. 90 tablet 3    omega-3 fatty acids/fish oil (FISH OIL-OMEGA-3 FATTY ACIDS) 300-1,000 mg capsule Take by mouth once daily.      ondansetron (ZOFRAN) 4 MG tablet Take 1 tablet (4 mg total) by mouth every 6 (six) hours as needed for Nausea. 30 tablet 2    SITagliptan-metformin (JANUMET XR) 50-1,000 mg TM24 Take 2 tablets in the am po 180 tablet 3    venlafaxine (EFFEXOR-XR) 37.5 MG 24 hr capsule Take 37.5 mg by mouth once daily.      [DISCONTINUED] pravastatin (PRAVACHOL) 20 MG tablet Take 1 tablet (20 mg total) by mouth nightly. 90 tablet 1    pravastatin (PRAVACHOL) 20 MG tablet Take 1 tablet (20 mg total) by mouth nightly. 90 tablet 1     "SITagliptin phosphate (JANUVIA) 100 MG Tab Take 100 mg by mouth.      [DISCONTINUED] pravastatin (PRAVACHOL) 20 MG tablet Take 1 tablet (20 mg total) by mouth nightly. 90 tablet 1     No facility-administered encounter medications on file as of 1/25/2023.       Allergies:  Review of patient's allergies indicates:   Allergen Reactions    Augmentin [amoxicillin-pot clavulanate] Diarrhea    Clarithromycin Diarrhea       Health Maintenance:  Immunization History   Administered Date(s) Administered    COVID-19, MRNA, LN-S, PF (MODERNA FULL 0.5 ML DOSE) 02/12/2021, 03/16/2021, 11/05/2021, 06/10/2022    COVID-19, mRNA, LNP-S, bivalent booster, PF (Moderna Omicron) 01/13/2023    Influenza (FLUAD) - Quadrivalent - Adjuvanted - PF *Preferred* (65+) 10/24/2022    Influenza - Quadrivalent - High Dose - PF (65 years and older) 01/26/2022    Influenza - Trivalent (ADULT) 01/28/2016    Pneumococcal Polysaccharide - 23 Valent 10/24/2022    Zoster 01/13/2023      Health Maintenance   Topic Date Due    TETANUS VACCINE  09/20/2023 (Originally 6/28/1963)    DEXA Scan  09/20/2023 (Originally 6/28/1985)    Eye Exam  09/27/2023 (Originally 2/9/2022)    Hepatitis C Screening  10/24/2023 (Originally 1945)    Hemoglobin A1c  03/21/2023    Lipid Panel  09/22/2023        Physical Exam      Vital Signs  Temp: 98.5 °F (36.9 °C)  Pulse: 78  Resp: 18  SpO2: 99 %  BP: 126/60  BP Location: Left arm  Patient Position: Sitting  Height and Weight  Height: 5' 6" (167.6 cm)  Weight: 66.8 kg (147 lb 3.2 oz)  BSA (Calculated - sq m): 1.76 sq meters  BMI (Calculated): 23.8  Weight in (lb) to have BMI = 25: 154.6]    Physical Exam  Constitutional:       Appearance: Normal appearance.   HENT:      Head: Normocephalic.      Right Ear: Tympanic membrane, ear canal and external ear normal. There is no impacted cerumen.      Left Ear: Tympanic membrane, ear canal and external ear normal. There is no impacted cerumen.   Eyes:        Comments: Mild swelling " noted   Cardiovascular:      Rate and Rhythm: Normal rate and regular rhythm.      Pulses: Normal pulses.      Heart sounds: Normal heart sounds.   Pulmonary:      Effort: Pulmonary effort is normal.      Breath sounds: Normal breath sounds.   Abdominal:      General: Bowel sounds are normal.      Palpations: Abdomen is soft.   Skin:     General: Skin is warm and dry.   Neurological:      Mental Status: She is alert and oriented to person, place, and time.   Psychiatric:         Attention and Perception: Attention normal.         Mood and Affect: Affect is flat.         Speech: Speech normal.         Behavior: Behavior is cooperative.        Laboratory:  CBC:  Recent Labs   Lab 07/02/21  1138 02/21/22  0832 06/07/22  0928 09/28/22  0712   WBC 4.89 5.31  --  5.57   RBC 4.09 L 4.00 L  --  4.02 L   Hemoglobin 11.2 L 11.5 L   < > 11.7 L   Hematocrit 35.8 L 35.4 L   < > 36.0 L   Platelet Count 218 186  --  221   MCV 87.5 88.5  --  89.6   MCH 27.4 28.8  --  29.1   MCHC 31.3 L 32.5  --  32.5    < > = values in this interval not displayed.     CMP:  Recent Labs   Lab 07/02/21  1138 02/21/22  0832 09/22/22  0715 09/28/22  0712   Glucose 96   < > 115 H 135 H   Calcium 9.2   < > 9.7 9.5   Albumin 3.8  --  3.7 3.8   Total Protein 7.0   < > 7.1 6.8   Sodium 143   < > 141 138   Potassium 4.7   < > 4.2 4.7   CO2 33 H   < > 28 30   Chloride 108 H   < > 109 H 104   BUN 23 H   < > 18 18   Alk Phos 49 L  --  50 L 51 L   ALT 21  --  26 22   AST 14 L  --  12 L 15   Bilirubin, Total 0.4  --  0.3 0.4    < > = values in this interval not displayed.     LIPIDS:  Recent Labs   Lab 10/26/21  0920 09/22/22  0715 09/28/22  0712   TSH  --   --  0.587   HDL Cholesterol 71 H 73 H  --    Cholesterol 138 157  --    Triglycerides 74 61  --    LDL Calculated 52 72  --    Cholesterol/HDL Ratio (Risk Factor) 1.9 2.2  --    Non-HDL 67 84  --      TSH:  Recent Labs   Lab 09/28/22  0712   TSH 0.587     A1C:  Recent Labs   Lab 06/16/21  1333 10/26/21  0920  "01/26/22  0905 09/21/22  0852   Hemoglobin A1C 6.6 6.6 6.4 6.4       Point Of Care Testing:  No results found for: WBCUR, NITRITE, UROBILINOGEN, PROTEINPOC, PHUR, BLOODUR, SPECGRAV, KETONESU, BILIRUBINPOC, GLUCOSEUR    No results found for: [id number], RAPFLUA, RAPFLUB      Assessment/Plan     Hyperlipidemia, unspecified hyperlipidemia type  -     pravastatin (PRAVACHOL) 20 MG tablet; Take 1 tablet (20 mg total) by mouth nightly.  Dispense: 90 tablet; Refill: 1  -     Cancel: Basic Metabolic Panel; Future; Expected date: 01/25/2023    Benign essential hypertension    Type 2 diabetes mellitus without complication, without long-term current use of insulin    Occipital headache    Ocular pruritus    Continue Follow up with Neurology for headaches   Follow up with NÉSTOR GARRISON for diabetes   BP satisfactory today  Patient declined labs today  DENISE signed and faxed to cardiologist to get last office note   DENISE signed and faxed to get copy of last eye exam from Dr. Ailin Marc  & she was instructed to follow up with Dr. Marc for "itching" of her left eye   Declined mental health referral and/or medication for depression today     Return to clinic in 3 months.    ERLINDA Nguyen-[facility]     "

## 2023-02-06 ENCOUNTER — TELEPHONE (OUTPATIENT)
Dept: FAMILY MEDICINE | Facility: CLINIC | Age: 78
End: 2023-02-06
Payer: MEDICARE

## 2023-02-06 NOTE — TELEPHONE ENCOUNTER
----- Message from Monico Burciaga sent at 2/6/2023 12:07 PM CST -----  Pt is needing a call back about coming in today. She said she went to the dr this morning and they gave her something to come see jaclyn and get some shots??     [phone]

## 2023-02-06 NOTE — TELEPHONE ENCOUNTER
Spoke with TRACY Pruett and recommend patient to see Neurology for her Headaches, due to her Neurologist has been treating the symptoms. Patient voiced understanding.  Elisa Pop LPN

## 2023-02-22 ENCOUNTER — OFFICE VISIT (OUTPATIENT)
Dept: NEUROLOGY | Facility: CLINIC | Age: 78
End: 2023-02-22
Payer: MEDICARE

## 2023-02-22 VITALS
WEIGHT: 145 LBS | OXYGEN SATURATION: 98 % | BODY MASS INDEX: 23.3 KG/M2 | DIASTOLIC BLOOD PRESSURE: 66 MMHG | SYSTOLIC BLOOD PRESSURE: 118 MMHG | HEIGHT: 66 IN | HEART RATE: 70 BPM

## 2023-02-22 DIAGNOSIS — Z79.899 ON LONG TERM DRUG THERAPY: ICD-10-CM

## 2023-02-22 DIAGNOSIS — G47.30 SLEEP APNEA, UNSPECIFIED TYPE: ICD-10-CM

## 2023-02-22 DIAGNOSIS — R51.9 OCCIPITAL HEADACHE: Primary | ICD-10-CM

## 2023-02-22 PROCEDURE — 3288F FALL RISK ASSESSMENT DOCD: CPT | Mod: CPTII,,, | Performed by: NURSE PRACTITIONER

## 2023-02-22 PROCEDURE — 1160F PR REVIEW ALL MEDS BY PRESCRIBER/CLIN PHARMACIST DOCUMENTED: ICD-10-PCS | Mod: CPTII,,, | Performed by: NURSE PRACTITIONER

## 2023-02-22 PROCEDURE — 99213 OFFICE O/P EST LOW 20 MIN: CPT | Mod: S$PBB,,, | Performed by: NURSE PRACTITIONER

## 2023-02-22 PROCEDURE — 3074F PR MOST RECENT SYSTOLIC BLOOD PRESSURE < 130 MM HG: ICD-10-PCS | Mod: CPTII,,, | Performed by: NURSE PRACTITIONER

## 2023-02-22 PROCEDURE — 99213 PR OFFICE/OUTPT VISIT, EST, LEVL III, 20-29 MIN: ICD-10-PCS | Mod: S$PBB,,, | Performed by: NURSE PRACTITIONER

## 2023-02-22 PROCEDURE — 1159F PR MEDICATION LIST DOCUMENTED IN MEDICAL RECORD: ICD-10-PCS | Mod: CPTII,,, | Performed by: NURSE PRACTITIONER

## 2023-02-22 PROCEDURE — 1101F PT FALLS ASSESS-DOCD LE1/YR: CPT | Mod: CPTII,,, | Performed by: NURSE PRACTITIONER

## 2023-02-22 PROCEDURE — 3074F SYST BP LT 130 MM HG: CPT | Mod: CPTII,,, | Performed by: NURSE PRACTITIONER

## 2023-02-22 PROCEDURE — 1159F MED LIST DOCD IN RCRD: CPT | Mod: CPTII,,, | Performed by: NURSE PRACTITIONER

## 2023-02-22 PROCEDURE — 1101F PR PT FALLS ASSESS DOC 0-1 FALLS W/OUT INJ PAST YR: ICD-10-PCS | Mod: CPTII,,, | Performed by: NURSE PRACTITIONER

## 2023-02-22 PROCEDURE — 3078F DIAST BP <80 MM HG: CPT | Mod: CPTII,,, | Performed by: NURSE PRACTITIONER

## 2023-02-22 PROCEDURE — 3288F PR FALLS RISK ASSESSMENT DOCUMENTED: ICD-10-PCS | Mod: CPTII,,, | Performed by: NURSE PRACTITIONER

## 2023-02-22 PROCEDURE — 1160F RVW MEDS BY RX/DR IN RCRD: CPT | Mod: CPTII,,, | Performed by: NURSE PRACTITIONER

## 2023-02-22 PROCEDURE — 3078F PR MOST RECENT DIASTOLIC BLOOD PRESSURE < 80 MM HG: ICD-10-PCS | Mod: CPTII,,, | Performed by: NURSE PRACTITIONER

## 2023-02-22 PROCEDURE — 99215 OFFICE O/P EST HI 40 MIN: CPT | Mod: PBBFAC | Performed by: NURSE PRACTITIONER

## 2023-02-22 RX ORDER — ERYTHROMYCIN 5 MG/G
OINTMENT OPHTHALMIC
COMMUNITY
Start: 2023-01-25 | End: 2024-01-08

## 2023-02-22 RX ORDER — GABAPENTIN 100 MG/1
CAPSULE ORAL
Qty: 60 CAPSULE | Refills: 3 | Status: SHIPPED | OUTPATIENT
Start: 2023-02-22 | End: 2023-03-22

## 2023-02-22 NOTE — PROGRESS NOTES
Subjective:       Patient ID: Fatmata Tolbert is a 77 y.o. female.    Chief Complaint:  Headache      History of Present Illness  This pleasant 77 year old right handed  female presents to the clinic as a work in for headaches and paresthesia. Patient states she has a headache today that she rates as a 6 on a scale of 0 to 10.  She still reports a daily headache that she also rates as a 6 on a scale of 0 to 10 with burning, tingling and aching pain mostly in the right occipital area. She states the gabapentin helps but does not relieve the headache. Headache trigger is unknown and she denies any aura. She states the headache had a gradual onset with photophobia. Resting in a quiet dark room makes the headaches better while lights make the headaches worse. She last seen the eye doctor in December 2022 and has another follow in May 2023 due to diabetes. She denies any neck or head injury. She is no longer taking anything over the counter for the headaches. Discussed the rebound headache again in detail.  She is on metoprolol  mg daily and I discussed with the patient that this is a beta blocker and will help prevent the headaches.  She has Vistaril 25 mg one twice a day as needed for the headache, no more than 3 days a week. She is tolerating these medications without side effects. Discussed increasing the  gabapentin 100 mg to twice a day. She is agreeable to this and desires to try the occipital nerve block. Discussed referral to pain treatment for the occipital nerve block. We can consider tegretol in the future. She does have ANNA and reports compliance with wearing the Cpap. She denies kidney stones or glaucoma. Her family medical history includes CVA, HTN, prostate cancer, breast cancer, headaches  and brain tumor. Her PMH includes DM, anxiety, HTN, chest pain, Heart failure, high cholesterol, C-diff, anemia, GERD, gastroparesis, DJD, ANNA, and heart ablation. She denies smoking or drinking  alcohol and states she had a total hysterectomy in the past.  Discussed the plan in detail with Neurologist Dr. TRACEE Rodarte and the patient and she is in agreement with the plan. All her questions were answered at today's visit.      MRI of the brain without contrast done on September 9, 2022 at [facility] showed no convincing imaging evidence of acute intracranial abnormality. Probable mild chronic microvascular ischemic changes.      CT of the head without contrast done on August 30, 2022 showed no evidence of abnormality demonstrated.           Review of Systems  Review of Systems   Constitutional:  Negative for activity change, diaphoresis, fever and unexpected weight change.   HENT:  Negative for congestion, ear pain, facial swelling, hearing loss, tinnitus, trouble swallowing and voice change.    Eyes:  Negative for photophobia, pain and visual disturbance.   Respiratory:  Negative for chest tightness, shortness of breath and wheezing.    Cardiovascular:  Negative for chest pain, palpitations and leg swelling.   Gastrointestinal:  Negative for constipation, diarrhea, nausea and vomiting.   Genitourinary:  Negative for difficulty urinating.   Musculoskeletal:  Negative for back pain, gait problem, neck pain and neck stiffness.   Skin:  Negative for color change, pallor, rash and wound.   Neurological:  Positive for numbness and headaches. Negative for dizziness, tremors, seizures, syncope, facial asymmetry, speech difficulty, weakness and light-headedness.   Psychiatric/Behavioral:  Negative for agitation, behavioral problems, confusion, decreased concentration and hallucinations. The patient is not nervous/anxious and is not hyperactive.      Objective:      Neurologic Exam     Mental Status   Oriented to person, place, and time.   Oriented to person.   Oriented to place.   Oriented to time.   Attention: normal. Concentration: normal.   Speech: speech is normal   Level of consciousness: alert  Knowledge: good.      Cranial Nerves     CN II   Visual fields full to confrontation.     CN III, IV, VI   Pupils are equal, round, and reactive to light.  Extraocular motions are normal.   Right pupil: Size: 3 mm. Shape: regular. Reactivity: brisk.   Left pupil: Size: 3 mm. Shape: regular. Reactivity: brisk.   CN III: no CN III palsy  CN VI: no CN VI palsy    CN V   Facial sensation intact.     CN VII   Facial expression full, symmetric.     CN VIII   CN VIII normal.   Hearing: intact    CN IX, X   CN IX normal.   CN X normal.     CN XI   CN XI normal.     CN XII   CN XII normal.     Motor Exam   Muscle bulk: normal  Overall muscle tone: normal  Right arm pronator drift: absent  Left arm pronator drift: absent    Strength   Right deltoid: 5/5  Left deltoid: 5/5  Right biceps: 5/5  Left biceps: 5/5  Right triceps: 5/5  Left triceps: 5/5  Right quadriceps: 5/5  Left quadriceps: 5/5  Right hamstrin/5  Left hamstrin/5    Sensory Exam   Light touch normal.     Gait, Coordination, and Reflexes     Gait  Gait: normal    Coordination   Finger to nose coordination: normal    Tremor   Resting tremor: absent  Intention tremor: absent  Action tremor: absent    Reflexes   Right brachioradialis: 2+  Left brachioradialis: 2+  Right biceps: 2+  Left biceps: 2+  Right triceps: 2+  Left triceps: 2+  Right patellar: 2+  Left patellar: 2+  Right achilles: 2+  Left achilles: 2+  Right : 4+  Left : 4+    Physical Exam  Constitutional:       General: She is not in acute distress.  HENT:      Head: Normocephalic.      Nose: Nose normal.      Mouth/Throat:      Mouth: Mucous membranes are moist.   Eyes:      Extraocular Movements: Extraocular movements intact and EOM normal.      Pupils: Pupils are equal, round, and reactive to light.   Cardiovascular:      Rate and Rhythm: Normal rate and regular rhythm.      Heart sounds: Normal heart sounds. No murmur heard.  Pulmonary:      Effort: Pulmonary effort is normal. No respiratory distress.       Breath sounds: Normal breath sounds. No wheezing, rhonchi or rales.   Musculoskeletal:         General: No swelling, tenderness, deformity or signs of injury. Normal range of motion.      Cervical back: Normal range of motion. No rigidity or tenderness.      Right lower leg: No edema.      Left lower leg: No edema.   Skin:     General: Skin is warm and dry.      Capillary Refill: Capillary refill takes less than 2 seconds.      Coloration: Skin is not jaundiced or pale.      Findings: No bruising, erythema, lesion or rash.   Neurological:      Mental Status: She is alert and oriented to person, place, and time.      Coordination: Finger-Nose-Finger Test normal.      Gait: Gait is intact.      Deep Tendon Reflexes:      Reflex Scores:       Tricep reflexes are 2+ on the right side and 2+ on the left side.       Bicep reflexes are 2+ on the right side and 2+ on the left side.       Brachioradialis reflexes are 2+ on the right side and 2+ on the left side.       Patellar reflexes are 2+ on the right side and 2+ on the left side.       Achilles reflexes are 2+ on the right side and 2+ on the left side.  Psychiatric:         Mood and Affect: Mood normal.         Speech: Speech normal.         Behavior: Behavior normal.         Assessment:     Problem List Items Addressed This Visit          Neuro    Occipital headache - Primary    Relevant Medications    gabapentin (NEURONTIN) 100 MG capsule    Other Relevant Orders    Ambulatory referral/consult to Pain Clinic       Palliative Care    On long term drug therapy       Other    Sleep apnea         Plan:     Renew and increase Gabapentin 100 mg one capsule in the morning and one capsule at bedtime  Continue Headache diary   Continue Cpap as prescribed  4.   Continue Vistaril 25 mg take one tablet twice a day AS NEEDED, no more than two in a day or two days in a week   5.   Referral to pain treatment for right occipital nerve block  6.   Consider Tegretol if needed    7.    Follow up with neurology in one month or sooner if needed

## 2023-02-22 NOTE — PATIENT INSTRUCTIONS
Renew and increase Gabapentin 100 mg one capsule in the morning and one capsule at bedtime  Continue Headache diary   Continue Cpap as prescribed  4.   Continue Vistaril 25 mg take one tablet twice a day AS NEEDED, no more than two in a day or two days in a week   5.   Referral to pain treatment for right occipital nerve block  6.   Consider Tegretol if needed    7.   Follow up with neurology in one month or sooner if needed

## 2023-02-28 ENCOUNTER — OFFICE VISIT (OUTPATIENT)
Dept: FAMILY MEDICINE | Facility: CLINIC | Age: 78
End: 2023-02-28
Payer: MEDICARE

## 2023-02-28 VITALS
TEMPERATURE: 98 F | WEIGHT: 143 LBS | BODY MASS INDEX: 22.98 KG/M2 | HEIGHT: 66 IN | OXYGEN SATURATION: 99 % | SYSTOLIC BLOOD PRESSURE: 116 MMHG | RESPIRATION RATE: 16 BRPM | DIASTOLIC BLOOD PRESSURE: 60 MMHG | HEART RATE: 65 BPM

## 2023-02-28 DIAGNOSIS — E11.9 TYPE 2 DIABETES MELLITUS WITHOUT COMPLICATION, WITHOUT LONG-TERM CURRENT USE OF INSULIN: ICD-10-CM

## 2023-02-28 DIAGNOSIS — K31.84 GASTROPARESIS: ICD-10-CM

## 2023-02-28 DIAGNOSIS — Z00.00 ENCOUNTER FOR INITIAL ANNUAL WELLNESS VISIT (AWV) IN MEDICARE PATIENT: Primary | ICD-10-CM

## 2023-02-28 DIAGNOSIS — I10 ESSENTIAL HYPERTENSION: ICD-10-CM

## 2023-02-28 DIAGNOSIS — R00.2 PALPITATIONS: ICD-10-CM

## 2023-02-28 DIAGNOSIS — E78.5 HYPERLIPIDEMIA, UNSPECIFIED HYPERLIPIDEMIA TYPE: ICD-10-CM

## 2023-02-28 PROCEDURE — G0438 PR WELCOME MEDICARE ANNUAL WELLNESS INITIAL VISIT: ICD-10-PCS | Mod: ,,, | Performed by: NURSE PRACTITIONER

## 2023-02-28 PROCEDURE — 1101F PT FALLS ASSESS-DOCD LE1/YR: CPT | Mod: ,,, | Performed by: NURSE PRACTITIONER

## 2023-02-28 PROCEDURE — 1101F PR PT FALLS ASSESS DOC 0-1 FALLS W/OUT INJ PAST YR: ICD-10-PCS | Mod: ,,, | Performed by: NURSE PRACTITIONER

## 2023-02-28 PROCEDURE — 3078F DIAST BP <80 MM HG: CPT | Mod: ,,, | Performed by: NURSE PRACTITIONER

## 2023-02-28 PROCEDURE — 1125F PR PAIN SEVERITY QUANTIFIED, PAIN PRESENT: ICD-10-PCS | Mod: ,,, | Performed by: NURSE PRACTITIONER

## 2023-02-28 PROCEDURE — 1160F RVW MEDS BY RX/DR IN RCRD: CPT | Mod: ,,, | Performed by: NURSE PRACTITIONER

## 2023-02-28 PROCEDURE — 1159F MED LIST DOCD IN RCRD: CPT | Mod: ,,, | Performed by: NURSE PRACTITIONER

## 2023-02-28 PROCEDURE — 3288F FALL RISK ASSESSMENT DOCD: CPT | Mod: ,,, | Performed by: NURSE PRACTITIONER

## 2023-02-28 PROCEDURE — 3074F SYST BP LT 130 MM HG: CPT | Mod: ,,, | Performed by: NURSE PRACTITIONER

## 2023-02-28 PROCEDURE — 3078F PR MOST RECENT DIASTOLIC BLOOD PRESSURE < 80 MM HG: ICD-10-PCS | Mod: ,,, | Performed by: NURSE PRACTITIONER

## 2023-02-28 PROCEDURE — 1160F PR REVIEW ALL MEDS BY PRESCRIBER/CLIN PHARMACIST DOCUMENTED: ICD-10-PCS | Mod: ,,, | Performed by: NURSE PRACTITIONER

## 2023-02-28 PROCEDURE — 1125F AMNT PAIN NOTED PAIN PRSNT: CPT | Mod: ,,, | Performed by: NURSE PRACTITIONER

## 2023-02-28 PROCEDURE — 3074F PR MOST RECENT SYSTOLIC BLOOD PRESSURE < 130 MM HG: ICD-10-PCS | Mod: ,,, | Performed by: NURSE PRACTITIONER

## 2023-02-28 PROCEDURE — 1159F PR MEDICATION LIST DOCUMENTED IN MEDICAL RECORD: ICD-10-PCS | Mod: ,,, | Performed by: NURSE PRACTITIONER

## 2023-02-28 PROCEDURE — G0438 PPPS, INITIAL VISIT: HCPCS | Mod: ,,, | Performed by: NURSE PRACTITIONER

## 2023-02-28 PROCEDURE — 3288F PR FALLS RISK ASSESSMENT DOCUMENTED: ICD-10-PCS | Mod: ,,, | Performed by: NURSE PRACTITIONER

## 2023-02-28 NOTE — PATIENT INSTRUCTIONS
Counseling and Referral of Other Preventative  (Italic type indicates deductible and co-insurance are waived)    Patient Name: Fatmata Tolbert  Today's Date: 2/28/2023    Health Maintenance         Date Due Completion Date    TETANUS VACCINE 09/20/2023 (Originally 6/28/1963) ---    DEXA Scan 09/20/2023 (Originally 6/28/1986) ---    Eye Exam 09/27/2023 (Originally 2/9/2022) 2/9/2021 (Done)    Override on 2/9/2021: Done    Hepatitis C Screening 10/24/2023 (Originally 1945) ---    Shingles Vaccine (2 of 3) 03/10/2023 1/13/2023    Hemoglobin A1c 03/21/2023 9/21/2022    Diabetes Urine Screening 09/22/2023 9/22/2022    Lipid Panel 09/22/2023 9/22/2022    Pneumococcal Vaccines (Age 65+) (2 - PCV) 10/24/2023 10/24/2022          No orders of the defined types were placed in this encounter.

## 2023-02-28 NOTE — PROGRESS NOTES
"   [facility]     PATIENT NAME: Fatmata Tolbert   : 1945    AGE: 77 y.o. DATE: 2023   MRN: [id number]        Reason for Visit / Chief Complaint: Medicare AWV (Initial [employer] AWV visit )        Fatmata Tolbert presents for an Initial [employer] Annual Wellness Visit today. She is not due for any labs and/or vaccines today. She does c/o headache today for which she is followed by neurology. She rates pain "5" on numeric pain scale today. Her medical history of includes: Hypertension, DM Type 2, Allergic Rhinitis, Obstructive Sleep Apnea uses CPAP, Cardiomyopathy, GERD, Hyperlipidemia, Osteoarthritis Multiple Joints, and Polyneuropathy    Review of Systems   Constitutional:  Negative for chills and fever.   HENT:  Negative for congestion, hearing loss and sore throat.    Respiratory:  Positive for shortness of breath (with exertion). Negative for cough.    Cardiovascular:  Negative for chest pain and leg swelling.   Gastrointestinal:  Positive for diarrhea and nausea.   Genitourinary:  Negative for dysuria, frequency and urgency.   Musculoskeletal:  Positive for back pain, joint pain, myalgias and neck pain.   Neurological:  Positive for headaches. Negative for dizziness.   Psychiatric/Behavioral:  Positive for depression ("sometimes"). The patient is not nervous/anxious and does not have insomnia.       The following components were reviewed and updated:      Medical/Social/Family History:  Past Medical History:   Diagnosis Date    Acute superficial gastritis without hemorrhage 2022    Anxiety     Cardiomyopathy     Diabetes mellitus, type 2     Diabetes, polyneuropathy     DJD (degenerative joint disease), multiple sites     Duodenitis 2022    GERD (gastroesophageal reflux disease)     HH (hiatus hernia) 2022    HTN (hypertension)     Hyperlipidemia     ANNA (obstructive sleep apnea)     Pseudophakia 2021        Family History   Problem Relation Age of " Onset    Breast cancer Sister     Hypertension Sister     Heart disease Sister     Diabetes Mother     Stroke Mother     Heart disease Father     Hypertension Father     Prostate cancer Father     Stroke Brother     Breast cancer Maternal Grandmother     Stroke Sister     Stroke Brother     Kidney disease Brother         Past Surgical History:   Procedure Laterality Date    BREAST BIOPSY      HYSTERECTOMY         Social History     Tobacco Use   Smoking Status Never   Smokeless Tobacco Never       Social History     Substance and Sexual Activity   Alcohol Use Never         Allergies and Current Medications     Review of patient's allergies indicates:   Allergen Reactions    Augmentin [amoxicillin-pot clavulanate] Diarrhea    Clarithromycin Diarrhea       Current Outpatient Medications:     ACCU-CHEK GUIDE GLUCOSE METER Misc, 1 strip by Misc.(Non-Drug; Combo Route) route once daily., Disp: , Rfl:     ACCU-CHEK SOFTCLIX LANCETS Misc, 1 lancet by Misc.(Non-Drug; Combo Route) route once daily. Use to checks usgar one x day., Disp: 100 each, Rfl: 3    alcohol swabs (BD ALCOHOL SWABS) PadM, Use to cleanse the skin prior to daily glucose check, Disp: 100 each, Rfl: 3    amLODIPine (NORVASC) 5 MG tablet, Take 5 mg by mouth., Disp: , Rfl:     aspirin (ECOTRIN) 81 MG EC tablet, Take 81 mg by mouth once daily., Disp: , Rfl:     blood sugar diagnostic (ACCU-CHEK GUIDE TEST STRIPS) Strp, Use to monitor glucose daily as directed., Disp: 100 strip, Rfl: 3    cholecalciferol, vitamin D3, 125 mcg (5,000 unit) Tab, Take 5,000 Units by mouth once daily., Disp: , Rfl:     erythromycin (ROMYCIN) ophthalmic ointment, Place into the left eye., Disp: , Rfl:     famotidine (PEPCID) 20 MG tablet, Take 1 tablet (20 mg total) by mouth 2 (two) times daily., Disp: 180 tablet, Rfl: 3    fluticasone propionate (FLONASE) 50 mcg/actuation nasal spray, 1 spray (50 mcg total) by Each Nostril route once daily., Disp: 9.9 mL, Rfl: 3    Lactobacillus  rhamnosus GG (CULTURELLE) 10 billion cell capsule, Take 1 capsule by mouth once daily., Disp: , Rfl:     lisinopriL (PRINIVIL,ZESTRIL) 40 MG tablet, Take 40 mg by mouth., Disp: , Rfl:     SORAYA BIOTIN ORAL, Take 3,000 mcg by mouth 2 (two) times a day., Disp: , Rfl:     metoprolol succinate (TOPROL-XL) 100 MG 24 hr tablet, Take 100 mg by mouth once daily., Disp: , Rfl:     mirtazapine (REMERON) 7.5 MG Tab, Take 1 tablet (7.5 mg total) by mouth every evening., Disp: 90 tablet, Rfl: 3    omega-3 fatty acids/fish oil (FISH OIL-OMEGA-3 FATTY ACIDS) 300-1,000 mg capsule, Take by mouth once daily., Disp: , Rfl:     ondansetron (ZOFRAN) 4 MG tablet, Take 1 tablet (4 mg total) by mouth every 6 (six) hours as needed for Nausea., Disp: 30 tablet, Rfl: 2    pravastatin (PRAVACHOL) 20 MG tablet, Take 1 tablet (20 mg total) by mouth nightly., Disp: 90 tablet, Rfl: 1    SITagliptin phosphate (JANUVIA) 100 MG Tab, Take 100 mg by mouth., Disp: , Rfl:     gabapentin (NEURONTIN) 100 MG capsule, Take one capsule in the morning and one capsule at bedtime (Patient not taking: Reported on 2/28/2023), Disp: 60 capsule, Rfl: 3    hydrOXYzine pamoate (VISTARIL) 25 MG Cap, Take one capsule twice a day as needed for headache, no more than two in a day or three days in a week (Patient not taking: Reported on 2/28/2023), Disp: 30 capsule, Rfl: 2    SITagliptan-metformin (JANUMET XR) 50-1,000 mg TM24, Take 2 tablets in the am po (Patient not taking: Reported on 2/28/2023), Disp: 180 tablet, Rfl: 3    venlafaxine (EFFEXOR-XR) 37.5 MG 24 hr capsule, Take 37.5 mg by mouth once daily., Disp: , Rfl:       Health Risk Assessment   Fall Risk:  not at risk   Obesity: BMI Body mass index is 23.08 kg/m².   Advance Directive:no but information given   Depression: PHQ9- 1   HTN:  DASH diet, exercise, weight management, med compliance, home BP monitoring, and follow-up discussed.   T2DM:  diabetic diet, glucose monitoring, activity level, weight management,  med compliance, and follow-up discussed.  STI: not at risk   Statin Use: yes      Health Maintenance   Last eye exam: states saw Dr. Marc 12/22   Last CV screen with lipids: 9/22/22   Diabetes screening with fasting glucose or A1c: 9/21/22   Colonoscopy: 12/18/19 Dr. Olvera -repeat in 5 years   Flu Vaccine: 10/24/22   Pneumonia vaccines: 10/24/22   COVID vaccine: (Moderna) 6/10/22, 11/15/21, 3/16/21, 2/12/21   Hep B vaccine: na   DEXA: declined   Last pap/pelvic: declined   Last Mammogram: 6/22/22  AAA screening: na   HIV Screening: not at risk  Hepatitis C Screen: declined  Low Dose CT Scan: na    Health Maintenance Topics with due status: Not Due       Topic Last Completion Date    Hemoglobin A1c 09/21/2022    Diabetes Urine Screening 09/22/2022    Lipid Panel 09/22/2022    Pneumococcal Vaccines (Age 65+) 10/24/2022    Shingles Vaccine 01/13/2023     There are no preventive care reminders to display for this patient.      Lab results available in Epic or see dates from [facility] above:   Lab Results   Component Value Date    CHOL 157 09/22/2022    CHOL 138 10/26/2021     Lab Results   Component Value Date    HDL 73 (H) 09/22/2022    HDL 71 (H) 10/26/2021     Lab Results   Component Value Date    LDLCALC 72 09/22/2022    LDLCALC 52 10/26/2021     Lab Results   Component Value Date    TRIG 61 09/22/2022    TRIG 74 10/26/2021     Lab Results   Component Value Date    CHOLHDL 2.2 09/22/2022    CHOLHDL 1.9 10/26/2021       Lab Results   Component Value Date    HGBA1C 6.4 09/21/2022       Sodium   Date Value Ref Range Status   09/28/2022 138 136 - 145 mmol/L Final     Potassium   Date Value Ref Range Status   09/28/2022 4.7 3.5 - 5.1 mmol/L Final     Chloride   Date Value Ref Range Status   09/28/2022 104 98 - 107 mmol/L Final     CO2   Date Value Ref Range Status   09/28/2022 30 21 - 32 mmol/L Final     Glucose   Date Value Ref Range Status   09/28/2022 135 (H) 74 - 106 mg/dL Final     BUN   Date Value Ref Range Status  "  09/28/2022 18 7 - 18 mg/dL Final     Creatinine   Date Value Ref Range Status   09/28/2022 0.91 0.55 - 1.02 mg/dL Final     Calcium   Date Value Ref Range Status   09/28/2022 9.5 8.5 - 10.1 mg/dL Final     Total Protein   Date Value Ref Range Status   09/28/2022 6.8 6.4 - 8.2 g/dL Final     Albumin   Date Value Ref Range Status   09/28/2022 3.8 3.5 - 5.0 g/dL Final     Bilirubin, Total   Date Value Ref Range Status   09/28/2022 0.4 >0.0 - 1.2 mg/dL Final     Alk Phos   Date Value Ref Range Status   09/28/2022 51 (L) 55 - 142 U/L Final     AST   Date Value Ref Range Status   09/28/2022 15 15 - 37 U/L Final     ALT   Date Value Ref Range Status   09/28/2022 22 13 - 56 U/L Final     Anion Gap   Date Value Ref Range Status   09/28/2022 9 7 - 16 mmol/L Final     eGFR    Date Value Ref Range Status   07/02/2021 81 >=60 mL/min/1.73m² Final     eGFR   Date Value Ref Range Status   02/21/2022 60 >=60 mL/min/1.73m² Final         Incontinence  Bowel: no  Bladder: no      Care Team  TRACEE Mean AGPCNP-BC -PCP                 Dr. PATY Gomez-cardiology                 Dr. Marc - optometry    **See Completed Assessments for Annual Wellness visit within the encounter summary    The following assessments were completed & reviewed:  Depression Screening  Cognitive function Screening  Timed Get Up Test  Whisper Test  Vision Screen  Health Risk Assessment  Checklist of ADLs and IADLs        Objective  Vitals:    02/28/23 0850   BP: 116/60   Pulse: 65   Resp: 16   Temp: 98 °F (36.7 °C)   TempSrc: Oral   SpO2: 99%   Weight: 64.9 kg (143 lb)   Height: 5' 6" (1.676 m)   PainSc:   5   PainLoc: Head      Body mass index is 23.08 kg/m².  Ideal body weight: 59.3 kg (130 lb 11.7 oz)       Physical Exam  Constitutional:       Appearance: Normal appearance.   HENT:      Right Ear: External ear normal.      Left Ear: External ear normal.   Cardiovascular:      Rate and Rhythm: Normal rate and regular rhythm.      Pulses: Normal " pulses.      Heart sounds: Normal heart sounds.   Pulmonary:      Effort: Pulmonary effort is normal.      Breath sounds: Normal breath sounds.   Abdominal:      General: Bowel sounds are normal.      Palpations: Abdomen is soft.   Skin:     General: Skin is warm and dry.   Neurological:      Mental Status: She is alert and oriented to person, place, and time.   Psychiatric:         Mood and Affect: Mood normal.         Behavior: Behavior normal.       Assessment:     1. Essential hypertension    2. Hyperlipidemia, unspecified hyperlipidemia type    3. Type 2 diabetes mellitus without complication, without long-term current use of insulin    4. Encounter for initial annual wellness visit (AWV) in Medicare patient    5. Palpitations    6. Gastroparesis    7. BMI 23.0-23.9, adult         Plan:    Referrals:   none     Advised to call office if does not hear from anyone with referral appt within 2-3 weeks to check on status of referral. Voiced understanding.      Discussed and provided with a screening schedule and personal prevention plan in accordance with USPSTF age appropriate recommendations and Medicare screening guidelines.   Education, counseling, and referrals were provided as needed.  After Visit Summary printed and given to patient which includes written education and a list of any referrals if indicated.     Education including diet, exercise, falls and advanced directives discussed with patient and patient verbalized understanding.      F/U plan for yearly AWV.    Signature: ERLINDA Ríos-BC

## 2023-03-22 ENCOUNTER — OFFICE VISIT (OUTPATIENT)
Dept: DIABETES SERVICES | Facility: CLINIC | Age: 78
End: 2023-03-22
Payer: MEDICARE

## 2023-03-22 VITALS
SYSTOLIC BLOOD PRESSURE: 124 MMHG | OXYGEN SATURATION: 96 % | HEART RATE: 68 BPM | DIASTOLIC BLOOD PRESSURE: 66 MMHG | WEIGHT: 146.38 LBS | BODY MASS INDEX: 23.53 KG/M2 | HEIGHT: 66 IN | RESPIRATION RATE: 14 BRPM

## 2023-03-22 DIAGNOSIS — I10 PRIMARY HYPERTENSION: ICD-10-CM

## 2023-03-22 DIAGNOSIS — G47.30 SLEEP APNEA, UNSPECIFIED TYPE: ICD-10-CM

## 2023-03-22 DIAGNOSIS — I49.3 PVC'S (PREMATURE VENTRICULAR CONTRACTIONS): ICD-10-CM

## 2023-03-22 DIAGNOSIS — E55.9 VITAMIN D DEFICIENCY: ICD-10-CM

## 2023-03-22 DIAGNOSIS — E78.5 HYPERLIPIDEMIA, UNSPECIFIED HYPERLIPIDEMIA TYPE: ICD-10-CM

## 2023-03-22 DIAGNOSIS — E11.9 TYPE 2 DIABETES MELLITUS WITHOUT COMPLICATION, WITHOUT LONG-TERM CURRENT USE OF INSULIN: Primary | ICD-10-CM

## 2023-03-22 DIAGNOSIS — K31.84 GASTROPARESIS: ICD-10-CM

## 2023-03-22 DIAGNOSIS — I50.22 CHRONIC SYSTOLIC HEART FAILURE: ICD-10-CM

## 2023-03-22 DIAGNOSIS — E53.8 VITAMIN B 12 DEFICIENCY: ICD-10-CM

## 2023-03-22 DIAGNOSIS — G44.229 CHRONIC TENSION-TYPE HEADACHE, NOT INTRACTABLE: ICD-10-CM

## 2023-03-22 LAB
GLUCOSE SERPL-MCNC: 110 MG/DL (ref 70–110)
HBA1C MFR BLD: 6.2 % (ref 4.5–6.6)

## 2023-03-22 PROCEDURE — 82962 GLUCOSE BLOOD TEST: CPT | Mod: PBBFAC | Performed by: NURSE PRACTITIONER

## 2023-03-22 PROCEDURE — 3074F SYST BP LT 130 MM HG: CPT | Mod: CPTII,,, | Performed by: NURSE PRACTITIONER

## 2023-03-22 PROCEDURE — 3288F PR FALLS RISK ASSESSMENT DOCUMENTED: ICD-10-PCS | Mod: CPTII,,, | Performed by: NURSE PRACTITIONER

## 2023-03-22 PROCEDURE — 99214 PR OFFICE/OUTPT VISIT, EST, LEVL IV, 30-39 MIN: ICD-10-PCS | Mod: S$PBB,,, | Performed by: NURSE PRACTITIONER

## 2023-03-22 PROCEDURE — 3074F PR MOST RECENT SYSTOLIC BLOOD PRESSURE < 130 MM HG: ICD-10-PCS | Mod: CPTII,,, | Performed by: NURSE PRACTITIONER

## 2023-03-22 PROCEDURE — 99215 OFFICE O/P EST HI 40 MIN: CPT | Mod: PBBFAC | Performed by: NURSE PRACTITIONER

## 2023-03-22 PROCEDURE — 1101F PR PT FALLS ASSESS DOC 0-1 FALLS W/OUT INJ PAST YR: ICD-10-PCS | Mod: CPTII,,, | Performed by: NURSE PRACTITIONER

## 2023-03-22 PROCEDURE — 3078F PR MOST RECENT DIASTOLIC BLOOD PRESSURE < 80 MM HG: ICD-10-PCS | Mod: CPTII,,, | Performed by: NURSE PRACTITIONER

## 2023-03-22 PROCEDURE — 1160F PR REVIEW ALL MEDS BY PRESCRIBER/CLIN PHARMACIST DOCUMENTED: ICD-10-PCS | Mod: CPTII,,, | Performed by: NURSE PRACTITIONER

## 2023-03-22 PROCEDURE — 1101F PT FALLS ASSESS-DOCD LE1/YR: CPT | Mod: CPTII,,, | Performed by: NURSE PRACTITIONER

## 2023-03-22 PROCEDURE — 1160F RVW MEDS BY RX/DR IN RCRD: CPT | Mod: CPTII,,, | Performed by: NURSE PRACTITIONER

## 2023-03-22 PROCEDURE — 3288F FALL RISK ASSESSMENT DOCD: CPT | Mod: CPTII,,, | Performed by: NURSE PRACTITIONER

## 2023-03-22 PROCEDURE — 99214 OFFICE O/P EST MOD 30 MIN: CPT | Mod: S$PBB,,, | Performed by: NURSE PRACTITIONER

## 2023-03-22 PROCEDURE — 1159F PR MEDICATION LIST DOCUMENTED IN MEDICAL RECORD: ICD-10-PCS | Mod: CPTII,,, | Performed by: NURSE PRACTITIONER

## 2023-03-22 PROCEDURE — 3078F DIAST BP <80 MM HG: CPT | Mod: CPTII,,, | Performed by: NURSE PRACTITIONER

## 2023-03-22 PROCEDURE — 83036 HEMOGLOBIN GLYCOSYLATED A1C: CPT | Mod: PBBFAC | Performed by: NURSE PRACTITIONER

## 2023-03-22 PROCEDURE — 1159F MED LIST DOCD IN RCRD: CPT | Mod: CPTII,,, | Performed by: NURSE PRACTITIONER

## 2023-03-22 NOTE — PROGRESS NOTES
Subjective:       Patient ID: Fatmata Tolbert is a 77 y.o. female.    Chief Complaint: General Diabetes Follow-up (Type 2 diabetes mellitus without complication, without long term current use of insulin. Checks sugar every morning.Complains of headaches since Aug.)    Here today for routine evaluation and med refill  Her only complaint is headaches.    Hemoglobin A1C       Date                     Value               Ref Range           Status                03/22/2023               6.2                 4.5 - 6.6 %         Final                 09/21/2022               6.4                 4.5 - 6.6 %         Final                 01/26/2022               6.4                 4.5 - 6.6 %         Final                 10/26/2021               6.6                 4.5 - 6.6 %         Final                 06/16/2021               6.6                 4.5 - 6.6 %         Final            ----------  Lab Results       Component                Value               Date                       MICROALBUR               8.2 (H)             09/22/2022            Lab Results       Component                Value               Date                       CHOL                     157                 09/22/2022                 CHOL                     138                 10/26/2021            Lab Results       Component                Value               Date                       HDL                      73 (H)              09/22/2022                 HDL                      71 (H)              10/26/2021            Lab Results       Component                Value               Date                       LDLCALC                  72                  09/22/2022                 LDLCALC                  52                  10/26/2021            No results found for: DLDL  Lab Results       Component                Value               Date                       TRIG                     61                  09/22/2022                 TRIG                      74                  10/26/2021              f1 Lab Results       Component                Value               Date                       CHOLHDL                  2.2                 09/22/2022                 CHOLHDL                  1.9                 10/26/2021            CMP  Sodium       Date                     Value               Ref Range           Status                09/28/2022               138                 136 - 145 mmol*     Final            ----------  Potassium       Date                     Value               Ref Range           Status                09/28/2022               4.7                 3.5 - 5.1 mmol*     Final            ----------  Chloride       Date                     Value               Ref Range           Status                09/28/2022               104                 98 - 107 mmol/L     Final            ----------  CO2       Date                     Value               Ref Range           Status                09/28/2022               30                  21 - 32 mmol/L      Final            ----------  Glucose       Date                     Value               Ref Range           Status                09/28/2022               135 (H)             74 - 106 mg/dL      Final            ----------  BUN       Date                     Value               Ref Range           Status                09/28/2022               18                  7 - 18 mg/dL        Final            ----------  Creatinine       Date                     Value               Ref Range           Status                09/28/2022               0.91                0.55 - 1.02 mg*     Final            ----------  Calcium       Date                     Value               Ref Range           Status                09/28/2022               9.5                 8.5 - 10.1 mg/*     Final            ----------  Total Protein       Date                     Value               Ref Range           Status                 09/28/2022               6.8                 6.4 - 8.2 g/dL      Final            ----------  Albumin       Date                     Value               Ref Range           Status                09/28/2022               3.8                 3.5 - 5.0 g/dL      Final            ----------  Bilirubin, Total       Date                     Value               Ref Range           Status                09/28/2022               0.4                 >0.0 - 1.2 mg/*     Final            ----------  Alk Phos       Date                     Value               Ref Range           Status                09/28/2022               51 (L)              55 - 142 U/L        Final            ----------  AST       Date                     Value               Ref Range           Status                09/28/2022               15                  15 - 37 U/L         Final            ----------  ALT       Date                     Value               Ref Range           Status                09/28/2022               22                  13 - 56 U/L         Final            ----------  Anion Gap       Date                     Value               Ref Range           Status                09/28/2022               9                   7 - 16 mmol/L       Final            ----------  eGFR       Date                     Value               Ref Range           Status                09/28/2022               66                  >=60 mL/min/1.*     Final            ----------      Review of Systems   Constitutional:  Negative for activity change, appetite change, diaphoresis and fatigue.   HENT:  Negative for nasal congestion, facial swelling and sinus pressure/congestion.    Eyes:  Negative for visual disturbance.   Respiratory:  Negative for shortness of breath and wheezing.    Cardiovascular:  Negative for chest pain and leg swelling.   Gastrointestinal:  Negative for constipation, diarrhea, nausea and vomiting.   Endocrine: Negative for  polydipsia, polyphagia and polyuria.   Genitourinary:  Negative for dysuria, frequency and urgency.   Musculoskeletal:  Negative for gait problem and myalgias.   Integumentary:  Negative for color change, rash and wound.   Neurological:  Negative for dizziness, syncope, weakness, headaches, coordination difficulties and coordination difficulties.   Hematological:  Does not bruise/bleed easily.   Psychiatric/Behavioral:  Negative for self-injury, sleep disturbance and suicidal ideas. The patient is not nervous/anxious.        Objective:      Physical Exam  Vitals and nursing note reviewed.   Constitutional:       Appearance: Normal appearance.   HENT:      Head: Normocephalic.   Neck:      Thyroid: No thyromegaly.      Vascular: No carotid bruit.   Cardiovascular:      Rate and Rhythm: Normal rate and regular rhythm.      Heart sounds: Normal heart sounds.   Pulmonary:      Effort: Pulmonary effort is normal.      Breath sounds: Normal breath sounds.   Musculoskeletal:         General: Normal range of motion.   Skin:     General: Skin is warm and dry.   Neurological:      General: No focal deficit present.      Mental Status: She is alert and oriented to person, place, and time.   Psychiatric:         Mood and Affect: Mood normal.         Behavior: Behavior normal.         Thought Content: Thought content normal.         Judgment: Judgment normal.       Assessment:       Problem List Items Addressed This Visit          Cardiac/Vascular    Chronic systolic heart failure    Hyperlipidemia    Hypertension    PVC's (premature ventricular contractions)       Endocrine    Type 2 diabetes mellitus - Primary    Relevant Orders    Hemoglobin A1C, POCT (Completed)    POCT Glucose, Hand-Held Device (Completed)       GI    Gastroparesis       Other    Sleep apnea         Plan:       1. Type 2 diabetes mellitus without complication, without long-term current use of insulin  No change in DM meds at this time  -     Hemoglobin A1C,  POCT  -     POCT Glucose, Hand-Held Device    2. Primary hypertension  ACE coverage    3. Chronic systolic heart failure    Overview:  Last Assessment & Plan:   Formatting of this note might be different from the original.  She looks euvolemic on exam today with stable symptoms.  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  Will continue guideline therapy with lisinopril and metoprolol.  As noted before, no MRA given history of hyperkalemia.  She will follow-up with Dr. Gomez as scheduled in October with echo.      4. Hyperlipidemia, unspecified hyperlipidemia type  Statin coverage    5. PVC's (premature ventricular contractions)    Overview:  Formatting of this note might be different from the original.  5.1% of total beats on recent 3 day event monitor.    Last Assessment & Plan:   Formatting of this note might be different from the original.  Improved since recent ablation.  Will continue her beta-blocker as usual and she will notify us of any worsening symptoms.      6. Gastroparesis      7. Sleep apnea, unspecified type

## 2023-03-22 NOTE — PATIENT INSTRUCTIONS
Pt is advised to monitor and document glucose fasting when you wake up before you eat and 2 hours after meal and bring in meter to next visit.      Ensure to take medications as directed.      Follow diabetic diet as directed.      Work to achieve normal body weight.     Ensure to exercise 4-5 times per week for 20 minutes.  Low Carbohydrate snacks/quick meals    Ham and cheese rollups - slice of ham wrapped around a string cheese/cheese slice. (0 gm carb)  Cucumber boats (stuffed with chicken salad or tuna salad - no fruit or sweet pickle in salad) (0 gm carb)  Celery and Peanut Butter (2 stalks with 1 Tbsp PNB = 6 gm carb)  Nuts - mixed (1/4 cup = 6 gm carb)  Sunflower seeds- without shell (1/4 cup = 7 gm carb) In the shell (1 cup = 3.3 gm carb)  Deviled eggs (no sweet pickles) - (0 gm carb)  Hard boiled eggs - (0 gm carb)  Guacamole with raw vegetables (mashed avocado with lime juice and seasoning to taste) (1 cup raw veg = 5 gm carb)  Ranch dressing with raw vegetables -(2 Tbsp Ranch = 2 gm carb, ½ cup raw veggies = 2.5 gm carb  Lasagna rolls- Slice zucchini thinly lengthwise and microwave for 1-2 minutes. Fill with ricotta, parmesan cheese. Roll and top with low carb tomato sauce. (5 rolls = 5 gm carb)  Crust less pizza -pepperoni or Argentine dennis topped with cheese and veggies +1 tbsp tomato sauce, microwave (1 gm carb)  Beef jerky - read label for lower carb jerky  Olives - Black (5 olives = 1 gm carb) Green (5 olives = 1 gm carb)  Dill pickles (1 whole dill pickle = 2 gm carb)  Sugar free jell-o (0 gm carb)  [place] Chicken Marinate chicken strips in 2 Tbsp sugar free maple syrup, 1 Tbsp lime juice, 1 Tbsp fresh cilantro. Krupp or cook in skillet sprayed with oil. (0 gm carb)  Chicken nachos - Heat oven to 350. Rub 5-6 chicken tenders with 1 Tbsp Mahesh Taco seasoning then drizzle with vegetable oil. Bake at 350 for 3-4 min and then flip and cook 3-4 min. Top with grated cheese, jalopenos, dennis, green  onion. Place back in oven at temp of 425 for 3-4 minutes. Serve with side of 2 Tbsp ranch dressing or sour cream. (3 gm carb)  Egg Mcmuffin: using egg (or egg white for a healthier version) as the bread put one slice of cheese with 1 slice Bejou dennis or sausage demar (1 gm carb per sandwich)  Southern Okra - Wash and dry fresh okra. Toss with olive oil, salt and pepper and roast in oven 10-20 minutes. (1 cup = 5 gm carb)  Crispy green beans - Joshua 5 lbs fresh green beans. Toss with 1/3 cup melted coconut oil and sprinkle with 4 tsp salt and 1 tsp onion and garlic powder. Bake at 170-175 for 8 hours or dehydrate overnight in . (1 cup = 5 gm carb)  Salt and vinegar zucchini chips - Thinly slice zucchini as thin as possible. In small bowl whisk 2 Tbsp olive oil, 2 Tbsp white vinegar, and 2 tsp sea salt. Add zucchini and dehydrate or bake on 170 for 3-4 hours till crispy. (½ cup = 3 gm carb). Make in large batches and keep in air tight container up to 1 week   Zucchini Mahesh chips - Thinly slice zucchini as thin as possible. Heat oil in fryer to 350 and drop zucchini in hot oil working in batches of about 20 chips at a time. Remove, drain and sprinkle with Mahesh Taco seasoning. (1/2 cup = 3 gm carb). Make in large batches and keep in air tight container up to 1 week.  Mahesh Taco Seasoning - 2 Tbsp chili powder, ½ tsp garlic powder, ½ tsp onion powder, ½ tsp crushed red pepper flakes, ½ tsp dried oregano, 3 tsp ground cumin, 2 tsp sea salt, 2 tsp black pepper. Makes 20 servings (0 gm carb)  [place]arrison milk (1 cup almond milk = 0.3 gm carb)  Cheese chips - Preheat oven 400. On a nonstick baking sheet add cheese slices (Cheddar, Swiss, Provolone, Shakeel Ronaldo), bake 10-12 minutes or until browned. Cool completely before removal. (2 slices = 1 gm carb). Store in air tight container up to 1 week.   Breakfast balls - mix together 2 lbs pork sausage, 1 lb ground beef, 3 eggs, 2 Tbsp dried onion flakes, ½ tsp black  pepper, ½ lb sharp cheddar cheese, shredded. Form into 4 dozen 1 balls. Bake on cookie sheet for 25 min at 375. Cool and may be frozen as well individually. (0 gm carb)  Meat muffins - spray muffin tin with cooking spray. Line muffin tin with 1 slice ham. Add 1 raw egg. Top with grated cheese and salt and pepper. Bake 10-12 min. (0 gm carb)  Pork rinds/Cracklings (0 gm carb)  Gummy Bears - Add 1 packet of Sugar free jello (flavor of your choice), 1 packet unflavored gelatin and 1/3 cup cold water to small sauce pan. Stir well and heat over low till it become liquid and dissolved (2-3 minutes). Pour into mold and refrigerate 30-40 minutes. Add only ¼ cup if you want them more firm. (0 gm carb)  Cheese and meat kabobs (0 gm carb)  Garlic parmesan cheese crisps - Preheat oven to 350. On a nonstick baking sheet, place 1 Tbsp grated parmesan cheese, sprinkle with garlic and basil. Bake about 5 minutes or until outside edges become mccain brown. Cool completely before removal (5 crisps = 1 gm carb)  Antipasti - Pepperoni, salami, green pepper, jalapeno pepper, mushrooms, onion, black olives, cheddar and provolone cheese cubes. Toss with Italian salad dressing. (1/2 cup = 5 gm carb)  [place] chicken wings - (0 gm carb)  Cheetos- preheat oven to 300. Chop ½ cup freshly shredded cheddar cheese that is frozen and place in  or  and chop into tiny pieces. In a mixing bowl, whip 3 egg whites and 1/8 tsp cream of tartar until VERY stiff peaks form. Sprinkle cheese on top of egg whites and then fold cheese into egg whites very carefully. Place mixture into large ziplock bag and cut hole in corner. Gently squeeze onto greased nonstick cookie pan in cheestos like shapes. Sprinkle with parmesan cheese. Bake for 30 minutes. Turn over off and leave in there for another 30 minutes. (1 cup = 1 gm carb)  Cheesy cauliflower tots - preheat oven to 400. Spray mini muffin tin with nonstick spray. Chop ½ large head of  cauliflower into small pieces. Place in microwavable bowl and cover. Microwave 2 minutes. Drain cauliflower. Place in  and pulse till finely chopped. To this add, 1/3 cup grated sharp cheddar, ¼ cup grated parmesan cheese, 2 Tbsp. almond flour, ¼ tsp salt, ½ tsp all purpose seasoning and 1 egg. Mix well. Place 1 Tbsp of mixture in each mini muffin tin. Bake 15 minutes. Remove and flip, bake another 15 minutes. Makes 24 tots. (10 tots = 5 gm carb)  Quest protein bars (carbs vary)  Ronaldo snacks - Preheat oven 350. 1 cup shredded Pepperjack paper. Scoop 1 Tbsp cheese. Place on non stick pan and press slightly flat. Top with 1 slice jalapeno pepper. Bake for 10-12 minutes till brown and crispy. Cool completely before removal. (10 crisps = 1 gm carb)  Snake bite (aka jalapeno poppers) - remove seeds and membrane from jalapenos, fill with cream cheese, wrap in dennis. Stick a toothpick through to hold dennis in place. Bake 15-20 min at 400 (4 bites = 2 gm carb)  5 minute PNB mousse - Beat together 4 oz softened cream cheese, 2 Tbsp natural PNB (no sugar added), ½ tsp vanilla extract and 1/3 cup Splenda. Fold in 1 cup Reddiwhip. Place in container of your choice and refrigerate up to 1 week. Top your serving with 1Tbsp Sugar free chocolate syrup. (1/2 cup = 4 gm carb)  BLT - Fill a leaf of esau lettuce leaf with 1 Tbsp LF garcia, 2 slices dennis, sliced tomato. Sprinkle salt and pepper. (0 gm carb)  Cinnamon and coconut bombs - in microwave safe bowl, mix 1 cup coconut butter, 1 cup coconut milk (full fat canned, not from box), 1 tsp vanilla extract, ½ tsp nutmeg and cinnamon, 1 tsp stevia powder extract. Melt until combined. Place bowl in fridge until hard enough to roll into 10-12 balls, about 30 minutes. Roll balls in 1 cup shredded coconut. Store in refrigerator (1 ball = 1 gm carb)

## 2023-03-29 ENCOUNTER — OFFICE VISIT (OUTPATIENT)
Dept: PAIN MEDICINE | Facility: CLINIC | Age: 78
End: 2023-03-29
Payer: MEDICARE

## 2023-03-29 VITALS
RESPIRATION RATE: 18 BRPM | HEIGHT: 66 IN | HEART RATE: 68 BPM | DIASTOLIC BLOOD PRESSURE: 48 MMHG | BODY MASS INDEX: 23.63 KG/M2 | WEIGHT: 147 LBS | SYSTOLIC BLOOD PRESSURE: 111 MMHG

## 2023-03-29 DIAGNOSIS — R51.9 OCCIPITAL HEADACHE: ICD-10-CM

## 2023-03-29 DIAGNOSIS — Z79.899 ENCOUNTER FOR LONG-TERM (CURRENT) USE OF OTHER MEDICATIONS: Primary | ICD-10-CM

## 2023-03-29 LAB

## 2023-03-29 PROCEDURE — 1159F PR MEDICATION LIST DOCUMENTED IN MEDICAL RECORD: ICD-10-PCS | Mod: CPTII,,, | Performed by: PAIN MEDICINE

## 2023-03-29 PROCEDURE — 1159F MED LIST DOCD IN RCRD: CPT | Mod: CPTII,,, | Performed by: PAIN MEDICINE

## 2023-03-29 PROCEDURE — 80305 DRUG TEST PRSMV DIR OPT OBS: CPT | Mod: PBBFAC | Performed by: PAIN MEDICINE

## 2023-03-29 PROCEDURE — G0480 DRUG TEST DEF 1-7 CLASSES: HCPCS | Mod: ,,, | Performed by: CLINICAL MEDICAL LABORATORY

## 2023-03-29 PROCEDURE — 99203 PR OFFICE/OUTPT VISIT, NEW, LEVL III, 30-44 MIN: ICD-10-PCS | Mod: S$PBB,,, | Performed by: PAIN MEDICINE

## 2023-03-29 PROCEDURE — 1101F PR PT FALLS ASSESS DOC 0-1 FALLS W/OUT INJ PAST YR: ICD-10-PCS | Mod: CPTII,,, | Performed by: PAIN MEDICINE

## 2023-03-29 PROCEDURE — 3288F PR FALLS RISK ASSESSMENT DOCUMENTED: ICD-10-PCS | Mod: CPTII,,, | Performed by: PAIN MEDICINE

## 2023-03-29 PROCEDURE — 1125F PR PAIN SEVERITY QUANTIFIED, PAIN PRESENT: ICD-10-PCS | Mod: CPTII,,, | Performed by: PAIN MEDICINE

## 2023-03-29 PROCEDURE — G0480 DRUG SCREEN DEFINITIVE 14, URINE: ICD-10-PCS | Mod: ,,, | Performed by: CLINICAL MEDICAL LABORATORY

## 2023-03-29 PROCEDURE — 3078F PR MOST RECENT DIASTOLIC BLOOD PRESSURE < 80 MM HG: ICD-10-PCS | Mod: CPTII,,, | Performed by: PAIN MEDICINE

## 2023-03-29 PROCEDURE — 1125F AMNT PAIN NOTED PAIN PRSNT: CPT | Mod: CPTII,,, | Performed by: PAIN MEDICINE

## 2023-03-29 PROCEDURE — 1101F PT FALLS ASSESS-DOCD LE1/YR: CPT | Mod: CPTII,,, | Performed by: PAIN MEDICINE

## 2023-03-29 PROCEDURE — 3074F PR MOST RECENT SYSTOLIC BLOOD PRESSURE < 130 MM HG: ICD-10-PCS | Mod: CPTII,,, | Performed by: PAIN MEDICINE

## 2023-03-29 PROCEDURE — 3288F FALL RISK ASSESSMENT DOCD: CPT | Mod: CPTII,,, | Performed by: PAIN MEDICINE

## 2023-03-29 PROCEDURE — 99203 OFFICE O/P NEW LOW 30 MIN: CPT | Mod: S$PBB,,, | Performed by: PAIN MEDICINE

## 2023-03-29 PROCEDURE — 3078F DIAST BP <80 MM HG: CPT | Mod: CPTII,,, | Performed by: PAIN MEDICINE

## 2023-03-29 PROCEDURE — 3074F SYST BP LT 130 MM HG: CPT | Mod: CPTII,,, | Performed by: PAIN MEDICINE

## 2023-03-29 PROCEDURE — 99215 OFFICE O/P EST HI 40 MIN: CPT | Mod: PBBFAC | Performed by: PAIN MEDICINE

## 2023-03-29 RX ORDER — HYDROXYZINE PAMOATE 25 MG/1
25 CAPSULE ORAL EVERY 12 HOURS PRN
COMMUNITY
End: 2023-10-04

## 2023-03-29 RX ORDER — GABAPENTIN 100 MG/1
100 CAPSULE ORAL 2 TIMES DAILY
COMMUNITY
End: 2023-10-04 | Stop reason: DRUGHIGH

## 2023-03-29 NOTE — PROGRESS NOTES
Chronic Pain - New Consult    Referring Physician: Haresh Chavira NP       SUBJECTIVE: Disclaimer: This note has been generated using voice-recognition software. There may be typographical errors that have been missed during proof-reading      Initial encounter:    Fatmata Tolbert presents to the clinic for the evaluation of right suboccipital pain.       77-year-old female presents for new patient evaluation and  consultation from Haresh Chavira NP.  Patient reports an onset of right-sided headache August 20 22.  She denies precipitating event except  for pain after moving her granddaughter to school.  She received two emergency room department visits and was treated with medication management.  She was evaluated by Neurology and diagnosed with right suboccipital neuralgia.  She describes the pain at right suboccipital, burning and with an electrical sensation with movement.  The pain occurs daily.  She denies visual involvement, nausea or vomiting.  She was referred for a right suboccipital block.  MRI of the brain revealed mild chronic microvascular ischemic changes without acute intracranial abnormality.  Pain Assessment  Pain Assessment: 0-10  Pain Score:   4  Pain Location: Head  Pain Orientation: Right  Pain Descriptors: Burning, Constant  Pain Frequency: Continuous  Pain Onset: On-going  Clinical Progression: Not changed  Aggravating Factors: Standing, Other (Comment) (lying)      Physical Therapy/Home Exercise: not applicable        Pain Medications:  has a current medication list which includes the following prescription(s): accu-chek guide glucose meter, accu-chek softclix lancets, alcohol swabs, amlodipine, aspirin, blood sugar diagnostic, cholecalciferol (vitamin d3), erythromycin, fluticasone propionate, lactobacillus rhamnosus gg, biotin, metoprolol succinate, mirtazapine, fish oil-omega-3 fatty acids, ondansetron, pravastatin, famotidine, gabapentin, hydroxyzine pamoate, lisinopril, and janumet  "xr.      Tried in Past:  NSAIDS-yes  TCA-no  SNRI-no  Anti-convulsants-yes  Muscle Relaxants-no  Opioids-no  Benzodiazepines-no     4A"s of Opioid Risk Assessment  Activity: Patient is unable to perform  ADL  Analgesia:  Patient's pain is not controlled by current medication.   Aberrant Behavior:  reviewed with no aberrant drug seeking/taking behavior     report:  Reviewed and consistent with medication use as prescribed.    Patient denies suicidal or homicidal ideations    Pain interventional therapy-no    Chiropractor -no  Acupuncture - no  TENS unit -no  Spinal decompression -no  Joint replacement -no     Review of Systems   Constitutional: Negative.    HENT: Negative.     Eyes: Negative.    Respiratory: Negative.     Cardiovascular: Negative.    Gastrointestinal: Negative.    Endocrine: Negative.    Genitourinary: Negative.    Musculoskeletal: Negative.    Integumentary:  Negative.   Neurological:  Positive for headaches.   Hematological: Negative.    Psychiatric/Behavioral: Negative.             CT Head Without Contrast  Narrative: EXAMINATION:  CT HEAD WITHOUT CONTRAST    CLINICAL HISTORY:  headache, facial numbness;    TECHNIQUE:  Axial CT imaging of the brain is performed without contrast with 3 mm increments.    CT dose reduction technique used - Dose Rite and tube current modulation.    COMPARISON:  None available    FINDINGS:  No evidence of hemorrhage, mass, mass effect, midline shift or acute infarct seen.  The brain parenchyma attenuation and differentiation appears within normal limits. The ventricles and cisterns are normal in caliber.  No cranial or skull base abnormality is identified.  Impression: No evidence of abnormality demonstrated.    Electronically signed by: Valerio Jennings  Date:    08/30/2022  Time:    09:58         Past Medical History:   Diagnosis Date    Acute superficial gastritis without hemorrhage 7/14/2022    Anxiety     Cardiomyopathy     Diabetes mellitus, type 2     " "Diabetes, polyneuropathy     DJD (degenerative joint disease), multiple sites     Duodenitis 7/14/2022    GERD (gastroesophageal reflux disease)     HH (hiatus hernia) 7/14/2022    HTN (hypertension)     Hyperlipidemia     ANNA (obstructive sleep apnea)     Pseudophakia 02/09/2021     Past Surgical History:   Procedure Laterality Date    BREAST BIOPSY      HYSTERECTOMY       Social History     Socioeconomic History    Marital status:    Tobacco Use    Smoking status: Never    Smokeless tobacco: Never   Substance and Sexual Activity    Alcohol use: Never    Drug use: Never    Sexual activity: Not Currently     Family History   Problem Relation Age of Onset    Breast cancer Sister     Hypertension Sister     Heart disease Sister     Diabetes Mother     Stroke Mother     Heart disease Father     Hypertension Father     Prostate cancer Father     Stroke Brother     Breast cancer Maternal Grandmother     Stroke Sister     Stroke Brother     Kidney disease Brother      Review of patient's allergies indicates:   Allergen Reactions    Augmentin [amoxicillin-pot clavulanate] Diarrhea    Clarithromycin Diarrhea         OBJECTIVE:  Vitals:    03/29/23 0936   BP: (!) 111/48   Pulse: 68   Resp: 18     BP (!) 111/48 (BP Location: Left arm, Patient Position: Sitting, BP Method: Large (Automatic))   Pulse 68   Resp 18   Ht 5' 6" (1.676 m)   Wt 66.7 kg (147 lb)   BMI 23.73 kg/m²   Physical Exam  Vitals and nursing note reviewed.   Constitutional:       General: She is not in acute distress.     Appearance: Normal appearance. She is not ill-appearing, toxic-appearing or diaphoretic.   HENT:      Head: Normocephalic and atraumatic.      Comments: Right suboccipital tenderness to palpation     Nose: Nose normal.      Mouth/Throat:      Mouth: Mucous membranes are moist.   Eyes:      Extraocular Movements: Extraocular movements intact.      Pupils: Pupils are equal, round, and reactive to light.   Cardiovascular:      Rate " and Rhythm: Normal rate and regular rhythm.      Heart sounds: Normal heart sounds.   Pulmonary:      Effort: Pulmonary effort is normal. No respiratory distress.      Breath sounds: Normal breath sounds. No stridor. No wheezing or rhonchi.   Abdominal:      General: Bowel sounds are normal.      Palpations: Abdomen is soft.   Musculoskeletal:         General: No swelling or deformity. Normal range of motion.      Cervical back: Normal and normal range of motion. No spasms or tenderness. No pain with movement. Normal range of motion.      Thoracic back: Normal.      Lumbar back: No spasms, tenderness or bony tenderness. Normal range of motion. Negative right straight leg raise test and negative left straight leg raise test. No scoliosis.      Right lower leg: Tenderness present. No edema.      Left lower leg: No edema.   Skin:     General: Skin is warm.   Neurological:      General: No focal deficit present.      Mental Status: She is alert and oriented to person, place, and time. Mental status is at baseline.      Cranial Nerves: No cranial nerve deficit.      Sensory: Sensation is intact. No sensory deficit.      Motor: No weakness.      Coordination: Coordination normal.      Gait: Gait normal.      Deep Tendon Reflexes: Reflexes are normal and symmetric.   Psychiatric:         Mood and Affect: Mood normal.         Behavior: Behavior normal.          ASSESSMENT: 77 y.o. year old female with pain, consistent with     Encounter Diagnoses   Name Primary?    Occipital headache     Encounter for long-term (current) use of other medications Yes        PLAN:   1. reviewed  2..Addiction, Dependency, Tolerance, Opioid abuse-misuse, Death, Diversion Discussed. Overdose reversal drug Naloxone discussed  3. Opioid contract signed today  4. Patient defers opioid management       Requested Prescriptions      No prescriptions requested or ordered in this encounter     5. Urine drug screen and confirmation testing was ordered  as documented on the requisition form in order to verify medication compliance, test for illicit substances.  6. Patient to return for right suboccipital block for  right suboccipital pain  Orders Placed This Encounter   Procedures    Drug Screen Definitive 14, Urine     Standing Status:   Future     Number of Occurrences:   1     Standing Expiration Date:   5/27/2024     Order Specific Question:   Specimen Source     Answer:   Urine    Ambulatory referral/consult to Pain Clinic     Standing Status:   Future     Standing Expiration Date:   4/29/2024     Referral Priority:   Routine     Referral Type:   Consultation     Referral Reason:   Specialty Services Required     Referred to Provider:   Erika Higgins MD     Requested Specialty:   Pain Medicine     Number of Visits Requested:   1    POCT Urine Drug Screen ([facility])     Interpretive Information:     Negative:  No drug detected at the cut off level.   Positive:  This result represents presumptive positive for the   tested drug, other substances may yield a positive response other   than the analyte of interest. This result should be utilized for   diagnostic purpose only. Confirmation testing will be performed upon physician request only.           The total time spent for evaluation and management on 03/30/2023 including reviewing separately obtained history, performing a medically appropriate exam and evaluation, documenting clinical information in the health record, independently interpreting results and communicating them to the patient/family/caregiver, and ordering medications/tests/procedures was between 15-29 minutes.    The above plan and management options were discussed at length with patient. Patient is in agreement with the above and verbalized understanding. It will be communicated with the referring physician via electronic record, fax, or mail.    Erika Higgins  03/30/2023

## 2023-04-04 ENCOUNTER — OFFICE VISIT (OUTPATIENT)
Dept: FAMILY MEDICINE | Facility: CLINIC | Age: 78
End: 2023-04-04
Payer: MEDICARE

## 2023-04-04 VITALS
TEMPERATURE: 99 F | DIASTOLIC BLOOD PRESSURE: 62 MMHG | BODY MASS INDEX: 23.3 KG/M2 | WEIGHT: 145 LBS | RESPIRATION RATE: 18 BRPM | HEIGHT: 66 IN | OXYGEN SATURATION: 98 % | HEART RATE: 67 BPM | SYSTOLIC BLOOD PRESSURE: 116 MMHG

## 2023-04-04 DIAGNOSIS — R11.0 NAUSEA: ICD-10-CM

## 2023-04-04 DIAGNOSIS — E11.9 TYPE 2 DIABETES MELLITUS WITHOUT COMPLICATION, WITHOUT LONG-TERM CURRENT USE OF INSULIN: Primary | ICD-10-CM

## 2023-04-04 DIAGNOSIS — E78.5 HYPERLIPIDEMIA, UNSPECIFIED HYPERLIPIDEMIA TYPE: ICD-10-CM

## 2023-04-04 DIAGNOSIS — F32.A DEPRESSION, UNSPECIFIED DEPRESSION TYPE: ICD-10-CM

## 2023-04-04 DIAGNOSIS — J30.1 NON-SEASONAL ALLERGIC RHINITIS DUE TO POLLEN: ICD-10-CM

## 2023-04-04 DIAGNOSIS — I47.29 NSVT (NONSUSTAINED VENTRICULAR TACHYCARDIA): ICD-10-CM

## 2023-04-04 DIAGNOSIS — R10.9 FLANK PAIN: ICD-10-CM

## 2023-04-04 LAB
6-ACETYLMORPHINE, URINE (RUSH): NEGATIVE 10 NG/ML
7-AMINOCLONAZEPAM, URINE (RUSH): NEGATIVE 25 NG/ML
A-HYDROXYALPRAZOLAM, URINE (RUSH): NEGATIVE 25 NG/ML
ACETYL FENTANYL, URINE (RUSH): NEGATIVE 2.5 NG/ML
ACETYL NORFENTANYL OXALATE, URINE (RUSH): NEGATIVE 5 NG/ML
ALBUMIN SERPL BCP-MCNC: 3.8 G/DL (ref 3.5–5)
ALBUMIN/GLOB SERPL: 1.2 {RATIO}
ALP SERPL-CCNC: 47 U/L (ref 55–142)
ALT SERPL W P-5'-P-CCNC: 15 U/L (ref 13–56)
AMPHET UR QL SCN: NEGATIVE
ANION GAP SERPL CALCULATED.3IONS-SCNC: 9 MMOL/L (ref 7–16)
AST SERPL W P-5'-P-CCNC: 9 U/L (ref 15–37)
BENZOYLECGONINE, URINE (RUSH): NEGATIVE 100 NG/ML
BILIRUB SERPL-MCNC: 0.2 MG/DL (ref ?–1.2)
BUN SERPL-MCNC: 19 MG/DL (ref 7–18)
BUN/CREAT SERPL: 22 (ref 6–20)
BUPRENORPHINE UR QL SCN: NEGATIVE 25 NG/ML
CALCIUM SERPL-MCNC: 9.9 MG/DL (ref 8.5–10.1)
CHLORIDE SERPL-SCNC: 108 MMOL/L (ref 98–107)
CO2 SERPL-SCNC: 30 MMOL/L (ref 21–32)
CODEINE, URINE (RUSH): NEGATIVE 25 NG/ML
CREAT SERPL-MCNC: 0.86 MG/DL (ref 0.55–1.02)
CREAT UR-MCNC: 269 MG/DL (ref 28–219)
EDDP, URINE (RUSH): NEGATIVE 25 NG/ML
EGFR (NO RACE VARIABLE) (RUSH/TITUS): 70 ML/MIN/1.73M²
FENTANYL, URINE (RUSH): NEGATIVE 2.5 NG/ML
GLOBULIN SER-MCNC: 3.3 G/DL (ref 2–4)
GLUCOSE SERPL-MCNC: 99 MG/DL (ref 74–106)
HYDROCODONE, URINE (RUSH): NEGATIVE 25 NG/ML
HYDROMORPHONE, URINE (RUSH): NEGATIVE 25 NG/ML
LORAZEPAM, URINE (RUSH): NEGATIVE 25 NG/ML
METHADONE UR QL SCN: NEGATIVE 25 NG/ML
METHAMPHET UR QL SCN: NEGATIVE
MORPHINE, URINE (RUSH): NEGATIVE 25 NG/ML
NORBUPRENORPHINE, URINE (RUSH): NEGATIVE 25 NG/ML
NORDIAZEPAM, URINE (RUSH): NEGATIVE 25 NG/ML
NORFENTANYL OXALATE, URINE (RUSH): NEGATIVE 5 NG/ML
NORHYDROCODONE, URINE (RUSH): NEGATIVE 50 NG/ML
NOROXYCODONE HCL, URINE (RUSH): NEGATIVE 50 NG/ML
OXAZEPAM, URINE (RUSH): NEGATIVE 25 NG/ML
OXYCODONE UR QL SCN: NEGATIVE 25 NG/ML
OXYMORPHONE, URINE (RUSH): NEGATIVE 25 NG/ML
PH UR STRIP: 6 PH UNITS
POTASSIUM SERPL-SCNC: 4.8 MMOL/L (ref 3.5–5.1)
PROT SERPL-MCNC: 7.1 G/DL (ref 6.4–8.2)
SODIUM SERPL-SCNC: 142 MMOL/L (ref 136–145)
SP GR UR STRIP: 1.02
TAPENTADOL, URINE (RUSH): NEGATIVE 25 NG/ML
TEMAZEPAM, URINE (RUSH): NEGATIVE 25 NG/ML
THC-COOH, URINE (RUSH): NEGATIVE 25 NG/ML
TRAMADOL, URINE (RUSH): NEGATIVE 100 NG/ML

## 2023-04-04 PROCEDURE — 99214 PR OFFICE/OUTPT VISIT, EST, LEVL IV, 30-39 MIN: ICD-10-PCS | Mod: ,,, | Performed by: FAMILY MEDICINE

## 2023-04-04 PROCEDURE — 80053 COMPREHENSIVE METABOLIC PANEL: ICD-10-PCS | Mod: ,,, | Performed by: CLINICAL MEDICAL LABORATORY

## 2023-04-04 PROCEDURE — 1126F PR PAIN SEVERITY QUANTIFIED, NO PAIN PRESENT: ICD-10-PCS | Mod: ,,, | Performed by: FAMILY MEDICINE

## 2023-04-04 PROCEDURE — 80053 COMPREHEN METABOLIC PANEL: CPT | Mod: ,,, | Performed by: CLINICAL MEDICAL LABORATORY

## 2023-04-04 PROCEDURE — 3078F PR MOST RECENT DIASTOLIC BLOOD PRESSURE < 80 MM HG: ICD-10-PCS | Mod: ,,, | Performed by: FAMILY MEDICINE

## 2023-04-04 PROCEDURE — 3074F SYST BP LT 130 MM HG: CPT | Mod: ,,, | Performed by: FAMILY MEDICINE

## 2023-04-04 PROCEDURE — 3074F PR MOST RECENT SYSTOLIC BLOOD PRESSURE < 130 MM HG: ICD-10-PCS | Mod: ,,, | Performed by: FAMILY MEDICINE

## 2023-04-04 PROCEDURE — 99214 OFFICE O/P EST MOD 30 MIN: CPT | Mod: ,,, | Performed by: FAMILY MEDICINE

## 2023-04-04 PROCEDURE — 3078F DIAST BP <80 MM HG: CPT | Mod: ,,, | Performed by: FAMILY MEDICINE

## 2023-04-04 PROCEDURE — 1126F AMNT PAIN NOTED NONE PRSNT: CPT | Mod: ,,, | Performed by: FAMILY MEDICINE

## 2023-04-04 RX ORDER — CETIRIZINE HYDROCHLORIDE 10 MG/1
10 TABLET ORAL DAILY
Qty: 90 TABLET | Refills: 2 | Status: SHIPPED | OUTPATIENT
Start: 2023-04-04 | End: 2023-09-20 | Stop reason: SDUPTHER

## 2023-04-04 RX ORDER — HYDROXYZINE PAMOATE 25 MG/1
CAPSULE ORAL
COMMUNITY
End: 2023-04-04 | Stop reason: SDUPTHER

## 2023-04-04 RX ORDER — PRAVASTATIN SODIUM 20 MG/1
TABLET ORAL
COMMUNITY
End: 2023-04-04 | Stop reason: SDUPTHER

## 2023-04-04 RX ORDER — SITAGLIPTIN AND METFORMIN HYDROCHLORIDE 1000; 50 MG/1; MG/1
TABLET, FILM COATED ORAL
COMMUNITY
End: 2023-04-04 | Stop reason: SDUPTHER

## 2023-04-04 RX ORDER — ONDANSETRON 4 MG/1
TABLET, ORALLY DISINTEGRATING ORAL
COMMUNITY
End: 2023-04-04 | Stop reason: SDUPTHER

## 2023-04-04 RX ORDER — CETIRIZINE HYDROCHLORIDE 10 MG/1
CAPSULE, LIQUID FILLED ORAL
COMMUNITY
End: 2023-04-04

## 2023-04-04 RX ORDER — MIRTAZAPINE 7.5 MG/1
7.5 TABLET, FILM COATED ORAL NIGHTLY
Qty: 90 TABLET | Refills: 2 | Status: SHIPPED | OUTPATIENT
Start: 2023-04-04 | End: 2023-09-20 | Stop reason: SDUPTHER

## 2023-04-04 RX ORDER — ONDANSETRON 4 MG/1
4 TABLET, FILM COATED ORAL EVERY 6 HOURS PRN
Qty: 90 TABLET | Refills: 0 | Status: SHIPPED | OUTPATIENT
Start: 2023-04-04 | End: 2024-01-08 | Stop reason: SDUPTHER

## 2023-04-04 RX ORDER — FLUTICASONE PROPIONATE 50 MCG
2 SPRAY, SUSPENSION (ML) NASAL DAILY
Qty: 48 G | Refills: 2 | Status: SHIPPED | OUTPATIENT
Start: 2023-04-04 | End: 2023-09-20 | Stop reason: SDUPTHER

## 2023-04-04 RX ORDER — FAMOTIDINE 20 MG/1
TABLET, FILM COATED ORAL
COMMUNITY
End: 2023-04-04 | Stop reason: SDUPTHER

## 2023-04-04 RX ORDER — PRAVASTATIN SODIUM 20 MG/1
20 TABLET ORAL NIGHTLY
Qty: 90 TABLET | Refills: 2 | Status: SHIPPED | OUTPATIENT
Start: 2023-04-04 | End: 2023-09-20 | Stop reason: SDUPTHER

## 2023-04-04 NOTE — PROGRESS NOTES
[facility]    Chief Complaint      Chief Complaint   Patient presents with    Back Pain     Lower left side. Hx of kidney stone       History of Present Illness      Fatmata Tolbert is a 77 y.o. female with chronic conditions of diabetes, hypertension, hyperlipidemia, cardiomyopathy, congestive heart failure, a, arthritis, and sleep apnea who presents today for and establishment of care visit and back pain.    Back Pain  Pertinent negatives include no abdominal pain, chest pain, dysuria, fever, headaches or weakness.     Past Medical History:  Past Medical History:   Diagnosis Date    Acute superficial gastritis without hemorrhage 7/14/2022    Anxiety     Cardiomyopathy     Diabetes mellitus, type 2     Diabetes, polyneuropathy     DJD (degenerative joint disease), multiple sites     Duodenitis 7/14/2022    GERD (gastroesophageal reflux disease)     HH (hiatus hernia) 7/14/2022    HTN (hypertension)     Hyperlipidemia     ANNA (obstructive sleep apnea)     Pseudophakia 02/09/2021       Past Surgical History:   has a past surgical history that includes Breast biopsy and Hysterectomy.    Social History:  Social History     Tobacco Use    Smoking status: Never    Smokeless tobacco: Never   Substance Use Topics    Alcohol use: Never    Drug use: Never       I personally reviewed all past medical, surgical, and social.     Review of Systems   Constitutional:  Negative for chills and fever.   HENT:  Negative for ear pain and sore throat.    Eyes:  Negative for blurred vision.   Respiratory:  Negative for cough and shortness of breath.    Cardiovascular:  Negative for chest pain and palpitations.   Gastrointestinal:  Negative for abdominal pain and constipation.   Genitourinary:  Positive for flank pain. Negative for dysuria and hematuria.        Compaining of left flank pain.  The pt has a history of kidney stones.   Musculoskeletal:  Positive for back pain. Negative for falls.   Skin:  Negative for itching and  rash.   Neurological:  Negative for weakness and headaches.   Endo/Heme/Allergies:  Negative for polydipsia. Does not bruise/bleed easily.   Psychiatric/Behavioral:  Negative for suicidal ideas. The patient does not have insomnia.       Medications:  Outpatient Encounter Medications as of 4/4/2023   Medication Sig Note Dispense Refill    ACCU-CHEK GUIDE GLUCOSE METER Misc 1 strip by Misc.(Non-Drug; Combo Route) route once daily.       ACCU-CHEK SOFTCLIX LANCETS Misc 1 lancet by Misc.(Non-Drug; Combo Route) route once daily. Use to checks usgar one x day.  100 each 3    alcohol swabs (BD ALCOHOL SWABS) PadM Use to cleanse the skin prior to daily glucose check  100 each 3    amLODIPine (NORVASC) 5 MG tablet Take 5 mg by mouth.       aspirin (ECOTRIN) 81 MG EC tablet Take 81 mg by mouth once daily.       blood sugar diagnostic (ACCU-CHEK GUIDE TEST STRIPS) Strp Use to monitor glucose daily as directed.  100 strip 3    cholecalciferol, vitamin D3, 125 mcg (5,000 unit) Tab Take 5,000 Units by mouth once daily.       erythromycin (ROMYCIN) ophthalmic ointment Place into the left eye.       gabapentin (NEURONTIN) 100 MG capsule Take 100 mg by mouth 2 (two) times daily.       hydrOXYzine pamoate (VISTARIL) 25 MG Cap Take 25 mg by mouth every 12 (twelve) hours as needed.       Lactobacillus rhamnosus GG (CULTURELLE) 10 billion cell capsule Take 1 capsule by mouth once daily.       lisinopriL (PRINIVIL,ZESTRIL) 40 MG tablet Take 40 mg by mouth.       SORAYA BIOTIN ORAL Take 3,000 mcg by mouth 2 (two) times a day.       metoprolol succinate (TOPROL-XL) 100 MG 24 hr tablet Take 100 mg by mouth once daily.       omega-3 fatty acids/fish oil (FISH OIL-OMEGA-3 FATTY ACIDS) 300-1,000 mg capsule Take by mouth once daily.       SITagliptan-metformin (JANUMET XR) 50-1,000 mg TM24 Take 2 tablets in the am po  180 tablet 3    [DISCONTINUED] cetirizine (ZYRTEC) 10 mg Cap ZyrTEC       [DISCONTINUED] famotidine (PEPCID) 20 MG tablet Take 1  tablet (20 mg total) by mouth 2 (two) times daily.  180 tablet 3    [DISCONTINUED] fluticasone propionate (FLONASE) 50 mcg/actuation nasal spray 1 spray (50 mcg total) by Each Nostril route once daily.  9.9 mL 3    [DISCONTINUED] mirtazapine (REMERON) 7.5 MG Tab Take 1 tablet (7.5 mg total) by mouth every evening.  90 tablet 3    [DISCONTINUED] ondansetron (ZOFRAN) 4 MG tablet Take 1 tablet (4 mg total) by mouth every 6 (six) hours as needed for Nausea.  30 tablet 2    [DISCONTINUED] pravastatin (PRAVACHOL) 20 MG tablet Take 1 tablet (20 mg total) by mouth nightly.  90 tablet 1    cetirizine (ZYRTEC) 10 MG tablet Take 1 tablet (10 mg total) by mouth once daily. 4/5/2023: prn 90 tablet 2    fluticasone propionate (FLONASE) 50 mcg/actuation nasal spray 2 sprays (100 mcg total) by Each Nostril route once daily. 4/5/2023: prn 48 g 2    mirtazapine (REMERON) 7.5 MG Tab Take 1 tablet (7.5 mg total) by mouth every evening.  90 tablet 2    ondansetron (ZOFRAN) 4 MG tablet Take 1 tablet (4 mg total) by mouth every 6 (six) hours as needed for Nausea.  90 tablet 0    pravastatin (PRAVACHOL) 20 MG tablet Take 1 tablet (20 mg total) by mouth nightly.  90 tablet 2    [DISCONTINUED] famotidine (PEPCID) 20 MG tablet 1 tablet at bedtime as needed Orally Twice a day       [DISCONTINUED] gabapentin (NEURONTIN) 100 MG capsule Take one capsule in the morning and one capsule at bedtime  60 capsule 3    [DISCONTINUED] hydrOXYzine pamoate (VISTARIL) 25 MG Cap Take one capsule twice a day as needed for headache, no more than two in a day or three days in a week (Patient not taking: Reported on 2/28/2023)  30 capsule 2    [DISCONTINUED] hydrOXYzine pamoate (VISTARIL) 25 MG Cap 1 capsule at bedtime as needed Orally Once a day for 30 day(s)       [DISCONTINUED] metoprolol succinate 100 mg CSpX 1 capsule.       [DISCONTINUED] ondansetron (ZOFRAN-ODT) 4 MG TbDL 1 tablet on the tongue and allow to dissolve Orally Once a day for 30 day(s)        "[DISCONTINUED] pravastatin (PRAVACHOL) 20 MG tablet 1 tablet Orally Once a day       [DISCONTINUED] SITagliptan-metformin (JANUMET) 50-1,000 mg per tablet 1 tablet with meals Orally Twice a day       [DISCONTINUED] SITagliptin phosphate (JANUVIA) 100 MG Tab Take 100 mg by mouth.       [DISCONTINUED] venlafaxine (EFFEXOR-XR) 37.5 MG 24 hr capsule Take 37.5 mg by mouth once daily.        No facility-administered encounter medications on file as of 4/4/2023.       Allergies:  Review of patient's allergies indicates:   Allergen Reactions    Augmentin [amoxicillin-pot clavulanate] Diarrhea    Clarithromycin Diarrhea       Health Maintenance:  Immunization History   Administered Date(s) Administered    COVID-19, MRNA, LN-S, PF (MODERNA FULL 0.5 ML DOSE) 02/12/2021, 03/16/2021, 11/05/2021, 06/10/2022    COVID-19, mRNA, LNP-S, bivalent booster, PF (Moderna Omicron) 01/13/2023    Influenza (FLUAD) - Quadrivalent - Adjuvanted - PF *Preferred* (65+) 10/24/2022    Influenza - Quadrivalent - High Dose - PF (65 years and older) 01/26/2022    Influenza - Trivalent (ADULT) 01/28/2016    Pneumococcal Polysaccharide - 23 Valent 10/24/2022    Zoster 01/13/2023      Health Maintenance   Topic Date Due    TETANUS VACCINE  09/20/2023 (Originally 6/28/1963)    DEXA Scan  09/20/2023 (Originally 6/28/1985)    Eye Exam  09/27/2023 (Originally 2/9/2022)    Hepatitis C Screening  10/24/2023 (Originally 1945)    Lipid Panel  09/22/2023    Hemoglobin A1c  09/22/2023        Physical Exam      Vital Signs  Temp: 98.9 °F (37.2 °C)  Temp Source: Oral  Pulse: 67  Resp: 18  SpO2: 98 %  BP: 116/62  BP Location: Left arm  Patient Position: Sitting  Pain Score: 0-No pain  Height and Weight  Height: 5' 6" (167.6 cm)  Weight: 65.8 kg (145 lb)  BSA (Calculated - sq m): 1.75 sq meters  BMI (Calculated): 23.4  Weight in (lb) to have BMI = 25: 154.6]    Physical Exam  Vitals and nursing note reviewed.   Constitutional:       Appearance: Normal appearance. "   HENT:      Head: Normocephalic and atraumatic.      Nose: Nose normal.   Eyes:      Extraocular Movements: Extraocular movements intact.      Conjunctiva/sclera: Conjunctivae normal.      Pupils: Pupils are equal, round, and reactive to light.   Cardiovascular:      Rate and Rhythm: Normal rate and regular rhythm.      Heart sounds: Normal heart sounds.   Pulmonary:      Effort: Pulmonary effort is normal.      Breath sounds: Normal breath sounds.   Musculoskeletal:         General: Tenderness present.      Right lower leg: No edema.      Left lower leg: No edema.   Skin:     Findings: No rash.   Neurological:      General: No focal deficit present.      Mental Status: She is alert and oriented to person, place, and time. Mental status is at baseline.      Cranial Nerves: No cranial nerve deficit.      Gait: Gait normal.   Psychiatric:         Mood and Affect: Mood normal.         Behavior: Behavior normal.         Thought Content: Thought content normal.         Judgment: Judgment normal.        Laboratory:  CBC:  Recent Labs   Lab 07/02/21  1138 02/21/22  0832 06/07/22  0928 09/28/22  0712   WBC 4.89 5.31  --  5.57   RBC 4.09 L 4.00 L  --  4.02 L   Hemoglobin 11.2 L 11.5 L   < > 11.7 L   Hematocrit 35.8 L 35.4 L   < > 36.0 L   Platelet Count 218 186  --  221   MCV 87.5 88.5  --  89.6   MCH 27.4 28.8  --  29.1   MCHC 31.3 L 32.5  --  32.5    < > = values in this interval not displayed.     CMP:  Recent Labs   Lab 09/22/22  0715 09/28/22  0712 04/04/23  1134   Glucose 115 H 135 H 99   Calcium 9.7 9.5 9.9   Albumin 3.7 3.8 3.8   Total Protein 7.1 6.8 7.1   Sodium 141 138 142   Potassium 4.2 4.7 4.8   CO2 28 30 30   Chloride 109 H 104 108 H   BUN 18 18 19 H   Alk Phos 50 L 51 L 47 L   ALT 26 22 15   AST 12 L 15 9 L   Bilirubin, Total 0.3 0.4 0.2     LIPIDS:  Recent Labs   Lab 10/26/21  0920 09/22/22  0715 09/28/22  0712   TSH  --   --  0.587   HDL Cholesterol 71 H 73 H  --    Cholesterol 138 157  --    Triglycerides  74 61  --    LDL Calculated 52 72  --    Cholesterol/HDL Ratio (Risk Factor) 1.9 2.2  --    Non-HDL 67 84  --      TSH:  Recent Labs   Lab 09/28/22  0712   TSH 0.587     A1C:  Recent Labs   Lab 06/16/21  1333 10/26/21  0920 01/26/22  0905 09/21/22  0852 03/22/23  0832   Hemoglobin A1C 6.6 6.6 6.4 6.4 6.2       Assessment/Plan     Fatmata Tolbert is a 77 y.o.female with:    1. Type 2 diabetes mellitus without complication, without long-term current use of insulin  - Comprehensive Metabolic Panel; Future  - Comprehensive Metabolic Panel    2. Hyperlipidemia, unspecified hyperlipidemia type  - pravastatin (PRAVACHOL) 20 MG tablet; Take 1 tablet (20 mg total) by mouth nightly.  Dispense: 90 tablet; Refill: 2    3. NSVT (nonsustained ventricular tachycardia)    4. Nausea  - ondansetron (ZOFRAN) 4 MG tablet; Take 1 tablet (4 mg total) by mouth every 6 (six) hours as needed for Nausea.  Dispense: 90 tablet; Refill: 0    5. Depression, unspecified depression type  - mirtazapine (REMERON) 7.5 MG Tab; Take 1 tablet (7.5 mg total) by mouth every evening.  Dispense: 90 tablet; Refill: 2    6. Non-seasonal allergic rhinitis due to pollen  - fluticasone propionate (FLONASE) 50 mcg/actuation nasal spray; 2 sprays (100 mcg total) by Each Nostril route once daily.  Dispense: 48 g; Refill: 2  - cetirizine (ZYRTEC) 10 MG tablet; Take 1 tablet (10 mg total) by mouth once daily.  Dispense: 90 tablet; Refill: 2    7. Flank pain  - X-Ray Abdomen AP 1 View; Future       Chronic conditions status updated as per HPI.  Other than changes above, cont current medications and maintain follow up with specialists.  Return to clinic in 6 months.    Allie Andersen DO  [facility]

## 2023-04-05 ENCOUNTER — OFFICE VISIT (OUTPATIENT)
Dept: PAIN MEDICINE | Facility: CLINIC | Age: 78
End: 2023-04-05
Payer: MEDICARE

## 2023-04-05 ENCOUNTER — OFFICE VISIT (OUTPATIENT)
Dept: GASTROENTEROLOGY | Facility: CLINIC | Age: 78
End: 2023-04-05
Payer: MEDICARE

## 2023-04-05 VITALS
DIASTOLIC BLOOD PRESSURE: 40 MMHG | BODY MASS INDEX: 24.01 KG/M2 | SYSTOLIC BLOOD PRESSURE: 123 MMHG | DIASTOLIC BLOOD PRESSURE: 45 MMHG | HEIGHT: 66 IN | BODY MASS INDEX: 23.95 KG/M2 | HEIGHT: 66 IN | SYSTOLIC BLOOD PRESSURE: 132 MMHG | RESPIRATION RATE: 18 BRPM | WEIGHT: 149.38 LBS | HEART RATE: 60 BPM | WEIGHT: 149 LBS | OXYGEN SATURATION: 100 % | HEART RATE: 63 BPM

## 2023-04-05 DIAGNOSIS — R11.0 NAUSEA: ICD-10-CM

## 2023-04-05 DIAGNOSIS — K31.84 GASTROPARESIS: Primary | ICD-10-CM

## 2023-04-05 DIAGNOSIS — K21.9 GASTROESOPHAGEAL REFLUX DISEASE, UNSPECIFIED WHETHER ESOPHAGITIS PRESENT: ICD-10-CM

## 2023-04-05 DIAGNOSIS — R51.9 OCCIPITAL HEADACHE: Primary | ICD-10-CM

## 2023-04-05 PROCEDURE — 3288F FALL RISK ASSESSMENT DOCD: CPT | Mod: CPTII,,, | Performed by: NURSE PRACTITIONER

## 2023-04-05 PROCEDURE — 99212 PR OFFICE/OUTPT VISIT, EST, LEVL II, 10-19 MIN: ICD-10-PCS | Mod: 25,S$PBB,, | Performed by: PAIN MEDICINE

## 2023-04-05 PROCEDURE — 1159F PR MEDICATION LIST DOCUMENTED IN MEDICAL RECORD: ICD-10-PCS | Mod: CPTII,,, | Performed by: NURSE PRACTITIONER

## 2023-04-05 PROCEDURE — 1159F MED LIST DOCD IN RCRD: CPT | Mod: CPTII,,, | Performed by: NURSE PRACTITIONER

## 2023-04-05 PROCEDURE — 3075F SYST BP GE 130 - 139MM HG: CPT | Mod: CPTII,,, | Performed by: PAIN MEDICINE

## 2023-04-05 PROCEDURE — 99215 OFFICE O/P EST HI 40 MIN: CPT | Mod: PBBFAC | Performed by: NURSE PRACTITIONER

## 2023-04-05 PROCEDURE — 3078F PR MOST RECENT DIASTOLIC BLOOD PRESSURE < 80 MM HG: ICD-10-PCS | Mod: CPTII,,, | Performed by: PAIN MEDICINE

## 2023-04-05 PROCEDURE — 99214 PR OFFICE/OUTPT VISIT, EST, LEVL IV, 30-39 MIN: ICD-10-PCS | Mod: S$PBB,,, | Performed by: NURSE PRACTITIONER

## 2023-04-05 PROCEDURE — 64405 NJX AA&/STRD GR OCPL NRV: CPT | Mod: PBBFAC,RT | Performed by: PAIN MEDICINE

## 2023-04-05 PROCEDURE — 3288F PR FALLS RISK ASSESSMENT DOCUMENTED: ICD-10-PCS | Mod: CPTII,,, | Performed by: NURSE PRACTITIONER

## 2023-04-05 PROCEDURE — 1101F PR PT FALLS ASSESS DOC 0-1 FALLS W/OUT INJ PAST YR: ICD-10-PCS | Mod: CPTII,,, | Performed by: PAIN MEDICINE

## 2023-04-05 PROCEDURE — 1160F RVW MEDS BY RX/DR IN RCRD: CPT | Mod: CPTII,,, | Performed by: NURSE PRACTITIONER

## 2023-04-05 PROCEDURE — 3078F DIAST BP <80 MM HG: CPT | Mod: CPTII,,, | Performed by: NURSE PRACTITIONER

## 2023-04-05 PROCEDURE — 1101F PT FALLS ASSESS-DOCD LE1/YR: CPT | Mod: CPTII,,, | Performed by: NURSE PRACTITIONER

## 2023-04-05 PROCEDURE — 99212 OFFICE O/P EST SF 10 MIN: CPT | Mod: 25,S$PBB,, | Performed by: PAIN MEDICINE

## 2023-04-05 PROCEDURE — 99214 OFFICE O/P EST MOD 30 MIN: CPT | Mod: S$PBB,,, | Performed by: NURSE PRACTITIONER

## 2023-04-05 PROCEDURE — 3288F PR FALLS RISK ASSESSMENT DOCUMENTED: ICD-10-PCS | Mod: CPTII,,, | Performed by: PAIN MEDICINE

## 2023-04-05 PROCEDURE — 1101F PT FALLS ASSESS-DOCD LE1/YR: CPT | Mod: CPTII,,, | Performed by: PAIN MEDICINE

## 2023-04-05 PROCEDURE — 1101F PR PT FALLS ASSESS DOC 0-1 FALLS W/OUT INJ PAST YR: ICD-10-PCS | Mod: CPTII,,, | Performed by: NURSE PRACTITIONER

## 2023-04-05 PROCEDURE — 99215 OFFICE O/P EST HI 40 MIN: CPT | Mod: PBBFAC | Performed by: PAIN MEDICINE

## 2023-04-05 PROCEDURE — 3075F PR MOST RECENT SYSTOLIC BLOOD PRESS GE 130-139MM HG: ICD-10-PCS | Mod: CPTII,,, | Performed by: PAIN MEDICINE

## 2023-04-05 PROCEDURE — 1125F PR PAIN SEVERITY QUANTIFIED, PAIN PRESENT: ICD-10-PCS | Mod: CPTII,,, | Performed by: PAIN MEDICINE

## 2023-04-05 PROCEDURE — 1160F PR REVIEW ALL MEDS BY PRESCRIBER/CLIN PHARMACIST DOCUMENTED: ICD-10-PCS | Mod: CPTII,,, | Performed by: NURSE PRACTITIONER

## 2023-04-05 PROCEDURE — 3074F PR MOST RECENT SYSTOLIC BLOOD PRESSURE < 130 MM HG: ICD-10-PCS | Mod: CPTII,,, | Performed by: NURSE PRACTITIONER

## 2023-04-05 PROCEDURE — 1125F AMNT PAIN NOTED PAIN PRSNT: CPT | Mod: CPTII,,, | Performed by: PAIN MEDICINE

## 2023-04-05 PROCEDURE — 3078F PR MOST RECENT DIASTOLIC BLOOD PRESSURE < 80 MM HG: ICD-10-PCS | Mod: CPTII,,, | Performed by: NURSE PRACTITIONER

## 2023-04-05 PROCEDURE — 3288F FALL RISK ASSESSMENT DOCD: CPT | Mod: CPTII,,, | Performed by: PAIN MEDICINE

## 2023-04-05 PROCEDURE — 3078F DIAST BP <80 MM HG: CPT | Mod: CPTII,,, | Performed by: PAIN MEDICINE

## 2023-04-05 PROCEDURE — 3074F SYST BP LT 130 MM HG: CPT | Mod: CPTII,,, | Performed by: NURSE PRACTITIONER

## 2023-04-05 PROCEDURE — 1159F PR MEDICATION LIST DOCUMENTED IN MEDICAL RECORD: ICD-10-PCS | Mod: CPTII,,, | Performed by: PAIN MEDICINE

## 2023-04-05 PROCEDURE — 64405: ICD-10-PCS | Mod: S$PBB,RT,, | Performed by: PAIN MEDICINE

## 2023-04-05 PROCEDURE — 1159F MED LIST DOCD IN RCRD: CPT | Mod: CPTII,,, | Performed by: PAIN MEDICINE

## 2023-04-05 RX ORDER — FAMOTIDINE 20 MG/1
20 TABLET, FILM COATED ORAL 2 TIMES DAILY
Qty: 180 TABLET | Refills: 3 | Status: SHIPPED | OUTPATIENT
Start: 2023-04-05 | End: 2024-03-18

## 2023-04-05 NOTE — PROGRESS NOTES
Fatmata Tolbert is a 77 y.o. female here for Follow-up        PCP: Allie Andersen  Referring Provider: No referring provider defined for this encounter.     HPI:  Presents for six-month follow-up due to gastroparesis.  Blas does continue to have chronic nausea related to gastroparesis.  No recent vomiting.  No report of abdominal pain.  Her last EGD was on 07/14/2022, cm hiatal hernia, mild gastritis.  She does request a refill on famotidine today due to reflux.  Preop.  We have discussed that she should disclose this C diff history when needing to take antibiotics.    Follow-up  Associated symptoms include headaches. Pertinent negatives include no abdominal pain, change in bowel habit, chest pain, fatigue, fever, nausea, vomiting or weakness.       ROS:  Review of Systems   Constitutional:  Negative for appetite change, fatigue, fever and unexpected weight change.   HENT:  Negative for trouble swallowing.    Respiratory:  Negative for shortness of breath.    Cardiovascular:  Negative for chest pain.   Gastrointestinal:  Negative for abdominal pain, blood in stool, change in bowel habit, constipation, diarrhea, nausea, vomiting, reflux and change in bowel habit.   Musculoskeletal:  Negative for gait problem.   Integumentary:  Negative for pallor.   Neurological:  Positive for dizziness and headaches. Negative for weakness and light-headedness.   Hematological:  Does not bruise/bleed easily.   Psychiatric/Behavioral:  The patient is not nervous/anxious.         PMHX:  has a past medical history of Acute superficial gastritis without hemorrhage (7/14/2022), Anxiety, Cardiomyopathy, Diabetes mellitus, type 2, Diabetes, polyneuropathy, DJD (degenerative joint disease), multiple sites, Duodenitis (7/14/2022), GERD (gastroesophageal reflux disease), HH (hiatus hernia) (7/14/2022), HTN (hypertension), Hyperlipidemia, ANNA (obstructive sleep apnea), and Pseudophakia (02/09/2021).    PSHX:  has a past surgical history that  includes Breast biopsy and Hysterectomy.    PFHX: family history includes Breast cancer in her maternal grandmother and sister; Diabetes in her mother; Heart disease in her father and sister; Hypertension in her father and sister; Kidney disease in her brother; Prostate cancer in her father; Stroke in her brother, brother, mother, and sister.    PSlHX:  reports that she has never smoked. She has never used smokeless tobacco. She reports that she does not drink alcohol and does not use drugs.        Review of patient's allergies indicates:   Allergen Reactions    Augmentin [amoxicillin-pot clavulanate] Diarrhea    Clarithromycin Diarrhea       Medication List with Changes/Refills   Current Medications    ACCU-CHEK GUIDE GLUCOSE METER MISC    1 strip by Misc.(Non-Drug; Combo Route) route once daily.    ACCU-CHEK SOFTCLIX LANCETS MISC    1 lancet by Misc.(Non-Drug; Combo Route) route once daily. Use to checks usgar one x day.    ALCOHOL SWABS (BD ALCOHOL SWABS) PADM    Use to cleanse the skin prior to daily glucose check    AMLODIPINE (NORVASC) 5 MG TABLET    Take 5 mg by mouth.    ASPIRIN (ECOTRIN) 81 MG EC TABLET    Take 81 mg by mouth once daily.    BLOOD SUGAR DIAGNOSTIC (ACCU-CHEK GUIDE TEST STRIPS) STRP    Use to monitor glucose daily as directed.    CETIRIZINE (ZYRTEC) 10 MG TABLET    Take 1 tablet (10 mg total) by mouth once daily.    CHOLECALCIFEROL, VITAMIN D3, 125 MCG (5,000 UNIT) TAB    Take 5,000 Units by mouth once daily.    ERYTHROMYCIN (ROMYCIN) OPHTHALMIC OINTMENT    Place into the left eye.    FLUTICASONE PROPIONATE (FLONASE) 50 MCG/ACTUATION NASAL SPRAY    2 sprays (100 mcg total) by Each Nostril route once daily.    GABAPENTIN (NEURONTIN) 100 MG CAPSULE    Take 100 mg by mouth 2 (two) times daily.    HYDROXYZINE PAMOATE (VISTARIL) 25 MG CAP    Take 25 mg by mouth every 12 (twelve) hours as needed.    LACTOBACILLUS RHAMNOSUS GG (CULTURELLE) 10 BILLION CELL CAPSULE    Take 1 capsule by mouth once  "daily.    LISINOPRIL (PRINIVIL,ZESTRIL) 40 MG TABLET    Take 40 mg by mouth.    SORAYA BIOTIN ORAL    Take 3,000 mcg by mouth 2 (two) times a day.    METOPROLOL SUCCINATE (TOPROL-XL) 100 MG 24 HR TABLET    Take 100 mg by mouth once daily.    MIRTAZAPINE (REMERON) 7.5 MG TAB    Take 1 tablet (7.5 mg total) by mouth every evening.    OMEGA-3 FATTY ACIDS/FISH OIL (FISH OIL-OMEGA-3 FATTY ACIDS) 300-1,000 MG CAPSULE    Take by mouth once daily.    ONDANSETRON (ZOFRAN) 4 MG TABLET    Take 1 tablet (4 mg total) by mouth every 6 (six) hours as needed for Nausea.    PRAVASTATIN (PRAVACHOL) 20 MG TABLET    Take 1 tablet (20 mg total) by mouth nightly.    SITAGLIPTAN-METFORMIN (JANUMET XR) 50-1,000 MG TM24    Take 2 tablets in the am po   Changed and/or Refilled Medications    Modified Medication Previous Medication    FAMOTIDINE (PEPCID) 20 MG TABLET famotidine (PEPCID) 20 MG tablet       Take 1 tablet (20 mg total) by mouth 2 (two) times daily.    Take 1 tablet (20 mg total) by mouth 2 (two) times daily.        Objective Findings:  Vital Signs:  BP (!) 123/45   Pulse 60   Ht 5' 6" (1.676 m)   Wt 67.8 kg (149 lb 6.4 oz)   SpO2 100%   BMI 24.11 kg/m²  Body mass index is 24.11 kg/m².    Physical Exam:  Physical Exam  Vitals and nursing note reviewed.   Constitutional:       General: She is not in acute distress.     Appearance: Normal appearance.   HENT:      Mouth/Throat:      Mouth: Mucous membranes are moist.   Cardiovascular:      Rate and Rhythm: Normal rate.   Pulmonary:      Effort: Pulmonary effort is normal.      Breath sounds: No wheezing, rhonchi or rales.   Abdominal:      General: Bowel sounds are normal. There is no distension.      Palpations: Abdomen is soft. There is no mass.      Tenderness: There is no abdominal tenderness. There is no guarding.   Skin:     General: Skin is warm and dry.      Coloration: Skin is not jaundiced or pale.   Neurological:      Mental Status: She is alert and oriented to person, " place, and time.   Psychiatric:         Mood and Affect: Mood normal.        Labs:  Lab Results   Component Value Date    WBC 5.57 09/28/2022    HGB 11.7 (L) 09/28/2022    HCT 36.0 (L) 09/28/2022    MCV 89.6 09/28/2022    RDW 13.1 09/28/2022     09/28/2022    LYMPH 40.9 09/28/2022    LYMPH 2.28 09/28/2022    MONO 8.3 (H) 09/28/2022    EOS 0.19 09/28/2022    BASO 0.02 09/28/2022     Lab Results   Component Value Date     04/04/2023    K 4.8 04/04/2023     (H) 04/04/2023    CO2 30 04/04/2023    GLU 99 04/04/2023    BUN 19 (H) 04/04/2023    CREATININE 0.86 04/04/2023    CALCIUM 9.9 04/04/2023    PROT 7.1 04/04/2023    ALBUMIN 3.8 04/04/2023    BILITOT 0.2 04/04/2023    ALKPHOS 47 (L) 04/04/2023    AST 9 (L) 04/04/2023    ALT 15 04/04/2023         Imaging: X-Ray Abdomen AP 1 View    Result Date: 4/4/2023  EXAMINATION: XR ABDOMEN AP 1 VIEW CLINICAL HISTORY: Abdominal pain COMPARISON: 30 May 2021 TECHNIQUE: XR ABDOMEN AP 1 VIEW FINDINGS: No free fluid or free air seen.  The bowel gas pattern appears within normal limits.  Bilateral calculi overlying the renal shadows are, similar to previous.  No other abnormal calcifications are present.  No other abnormality is identified.     Renal calculi similar to previous study.  No other significant findings. Electronically signed by: Valerio Jennings Date:    04/04/2023 Time:    12:30        Assessment:  Fatmata Tolbert is a 77 y.o. female here with:  1. Gastroparesis    2. Nausea    3. Gastroesophageal reflux disease, unspecified whether esophagitis present          Recommendations:  1. Do not lay down within 3 hours of eating.  Avoid spicy, greasy foods  Eat 6-8 small meals per day.  Exercise 150 minutes per week  Avoid raw fruits and vegetables  Small amount of protein and fiber at one time  2. Continue Pepcid  3. Anticipate colonoscopy in next year    Follow up in about 6 months (around 10/5/2023).      Order summary:  Orders Placed This Encounter     famotidine (PEPCID) 20 MG tablet       Thank you for allowing me to participate in the care of Fatmata Tolbert.      NAVNEET Solares

## 2023-04-05 NOTE — PROCEDURES
"Fatmata Tolbert is a 77 y.o. female patient.    Pulse: 63 (04/05/23 0830)  Resp: 18 (04/05/23 0830)  BP: (!) 132/40 (04/05/23 0830)  Weight: 67.6 kg (149 lb) (04/05/23 0830)  Height: 5' 6" (167.6 cm) (04/05/23 0830)       Right greater suboccipital nerve block    Date/Time: 4/5/2023 8:57 AM  Performed by: Erika Higgins MD  Authorized by: Erika Higgins MD   Consent Done: Yes  Indications: pain relief  Body area: head  Nerve: greater occipital  Laterality: right    Patient sedated: no  : Kenalog 40 mg.  Preparation: Patient was prepped and draped in the usual sterile fashion.  Patient position: sitting  Needle size: 25 G  Location technique: anatomical landmarks  Local Anesthetic: lidocaine 1% without epinephrine  Anesthetic total (ml): 3 cc 0.25% Marcaine.  Outcome: pain improved  Patient tolerance: Patient tolerated the procedure well with no immediate complications        4/5/2023    "

## 2023-04-10 ENCOUNTER — HOSPITAL ENCOUNTER (OUTPATIENT)
Dept: RADIOLOGY | Facility: HOSPITAL | Age: 78
Discharge: HOME | End: 2023-04-10
Payer: MEDICARE

## 2023-04-10 ENCOUNTER — HOSPITAL ENCOUNTER (OUTPATIENT)
Dept: RADIOLOGY | Facility: HOSPITAL | Age: 78
Discharge: HOME | End: 2023-04-10
Attending: RADIOLOGY
Payer: MEDICARE

## 2023-04-10 DIAGNOSIS — N64.4 BREAST PAIN: ICD-10-CM

## 2023-04-10 PROCEDURE — 77066 DX MAMMO INCL CAD BI: CPT | Mod: 26,,, | Performed by: RADIOLOGY

## 2023-04-10 PROCEDURE — 76641 ULTRASOUND BREAST COMPLETE: CPT | Mod: 26,50,, | Performed by: RADIOLOGY

## 2023-04-10 PROCEDURE — 77066 MAMMO DIGITAL DIAGNOSTIC BILAT: ICD-10-PCS | Mod: 26,,, | Performed by: RADIOLOGY

## 2023-04-10 PROCEDURE — 77066 DX MAMMO INCL CAD BI: CPT | Mod: TC

## 2023-04-10 PROCEDURE — 76641 US BREAST BILATERAL COMPLETE: ICD-10-PCS | Mod: 26,50,, | Performed by: RADIOLOGY

## 2023-04-10 PROCEDURE — 76641 ULTRASOUND BREAST COMPLETE: CPT | Mod: TC,50

## 2023-04-13 ENCOUNTER — PATIENT OUTREACH (OUTPATIENT)
Dept: ADMINISTRATIVE | Facility: HOSPITAL | Age: 78
End: 2023-04-13

## 2023-04-13 NOTE — PROGRESS NOTES
DENISE sent to [address] [facility] for vaccine records.  Health Maintenance Due   Topic Date Due    Shingles Vaccine (2 of 3) 03/10/2023

## 2023-04-26 ENCOUNTER — OFFICE VISIT (OUTPATIENT)
Dept: PAIN MEDICINE | Facility: CLINIC | Age: 78
End: 2023-04-26
Payer: MEDICARE

## 2023-04-26 VITALS
WEIGHT: 147 LBS | SYSTOLIC BLOOD PRESSURE: 140 MMHG | DIASTOLIC BLOOD PRESSURE: 52 MMHG | HEIGHT: 66 IN | HEART RATE: 64 BPM | BODY MASS INDEX: 23.63 KG/M2 | RESPIRATION RATE: 18 BRPM

## 2023-04-26 DIAGNOSIS — R51.9 OCCIPITAL HEADACHE: Primary | Chronic | ICD-10-CM

## 2023-04-26 PROCEDURE — 3077F PR MOST RECENT SYSTOLIC BLOOD PRESSURE >= 140 MM HG: ICD-10-PCS | Mod: CPTII,,, | Performed by: PAIN MEDICINE

## 2023-04-26 PROCEDURE — 99213 OFFICE O/P EST LOW 20 MIN: CPT | Mod: S$PBB,,, | Performed by: PAIN MEDICINE

## 2023-04-26 PROCEDURE — 1101F PT FALLS ASSESS-DOCD LE1/YR: CPT | Mod: CPTII,,, | Performed by: PAIN MEDICINE

## 2023-04-26 PROCEDURE — 1125F AMNT PAIN NOTED PAIN PRSNT: CPT | Mod: CPTII,,, | Performed by: PAIN MEDICINE

## 2023-04-26 PROCEDURE — 1101F PR PT FALLS ASSESS DOC 0-1 FALLS W/OUT INJ PAST YR: ICD-10-PCS | Mod: CPTII,,, | Performed by: PAIN MEDICINE

## 2023-04-26 PROCEDURE — 99213 PR OFFICE/OUTPT VISIT, EST, LEVL III, 20-29 MIN: ICD-10-PCS | Mod: S$PBB,,, | Performed by: PAIN MEDICINE

## 2023-04-26 PROCEDURE — 1125F PR PAIN SEVERITY QUANTIFIED, PAIN PRESENT: ICD-10-PCS | Mod: CPTII,,, | Performed by: PAIN MEDICINE

## 2023-04-26 PROCEDURE — 3078F DIAST BP <80 MM HG: CPT | Mod: CPTII,,, | Performed by: PAIN MEDICINE

## 2023-04-26 PROCEDURE — 3077F SYST BP >= 140 MM HG: CPT | Mod: CPTII,,, | Performed by: PAIN MEDICINE

## 2023-04-26 PROCEDURE — 3288F FALL RISK ASSESSMENT DOCD: CPT | Mod: CPTII,,, | Performed by: PAIN MEDICINE

## 2023-04-26 PROCEDURE — 3288F PR FALLS RISK ASSESSMENT DOCUMENTED: ICD-10-PCS | Mod: CPTII,,, | Performed by: PAIN MEDICINE

## 2023-04-26 PROCEDURE — 1159F MED LIST DOCD IN RCRD: CPT | Mod: CPTII,,, | Performed by: PAIN MEDICINE

## 2023-04-26 PROCEDURE — 1159F PR MEDICATION LIST DOCUMENTED IN MEDICAL RECORD: ICD-10-PCS | Mod: CPTII,,, | Performed by: PAIN MEDICINE

## 2023-04-26 PROCEDURE — 99215 OFFICE O/P EST HI 40 MIN: CPT | Mod: PBBFAC | Performed by: PAIN MEDICINE

## 2023-04-26 PROCEDURE — 3078F PR MOST RECENT DIASTOLIC BLOOD PRESSURE < 80 MM HG: ICD-10-PCS | Mod: CPTII,,, | Performed by: PAIN MEDICINE

## 2023-04-26 RX ORDER — TIZANIDINE 4 MG/1
4 TABLET ORAL 3 TIMES DAILY
Qty: 90 TABLET | Refills: 0 | Status: SHIPPED | OUTPATIENT
Start: 2023-04-26 | End: 2023-05-26

## 2023-04-26 NOTE — PROGRESS NOTES
She Disclaimer: This note has been generated using voice-recognition software. There may be typographical errors that have been missed during proof-reading        Patient ID: Fatmata Tolbert is a 77 y.o. female.      Chief Complaint: No chief complaint on file.      77-year-old female returns for re-evaluation following right suboccipital block April 5, 2023.  She reports greater than 50% pain relief to date and she no longer requires medication management.  Her pain is tolerable and she does not desire a repeat suboccipital block at this time.  Treatment with muscle relaxants was recommended for recurrent pain,  therefore I prescribed Zanaflex for muscle spasticity to use on an as-needed basis.          Pain Assessment  Pain Assessment: 0-10  Pain Score:   4  Pain Location: Head  Pain Orientation: Right  Pain Descriptors: Burning  Pain Frequency: Continuous  Pain Onset: On-going  Clinical Progression: Gradually improving  Aggravating Factors: Exercise  Pain Intervention(s): Rest      A's of Opioid Risk Assessment  Activity:Patient can perform ADL.   Analgesia: None.   Adverse Effects: Patient denies constipation or sedation.  Aberrant Behavior:  reviewed with no aberrant drug seeking/taking behavior.      Patient denies any suicidal or homicidal ideations    Physical Therapy/Home Exercise: no          Review of Systems   Constitutional: Negative.    HENT: Negative.     Eyes: Negative.    Respiratory: Negative.     Cardiovascular: Negative.    Gastrointestinal: Negative.    Endocrine: Negative.    Genitourinary: Negative.    Musculoskeletal: Negative.    Integumentary:  Negative.   Neurological:  Positive for headaches.   Hematological: Negative.    Psychiatric/Behavioral: Negative.             Past Medical History:   Diagnosis Date    Acute superficial gastritis without hemorrhage 7/14/2022    Anxiety     Cardiomyopathy     Diabetes mellitus, type 2     Diabetes, polyneuropathy     DJD (degenerative joint  disease), multiple sites     Duodenitis 7/14/2022    GERD (gastroesophageal reflux disease)     HH (hiatus hernia) 7/14/2022    HTN (hypertension)     Hyperlipidemia     ANNA (obstructive sleep apnea)     Pseudophakia 02/09/2021     Past Surgical History:   Procedure Laterality Date    BREAST BIOPSY      HYSTERECTOMY       Social History     Socioeconomic History    Marital status:    Tobacco Use    Smoking status: Never    Smokeless tobacco: Never   Substance and Sexual Activity    Alcohol use: Never    Drug use: Never    Sexual activity: Not Currently     Family History   Problem Relation Age of Onset    Breast cancer Sister     Hypertension Sister     Heart disease Sister     Diabetes Mother     Stroke Mother     Heart disease Father     Hypertension Father     Prostate cancer Father     Stroke Brother     Breast cancer Maternal Grandmother     Stroke Sister     Stroke Brother     Kidney disease Brother      Review of patient's allergies indicates:   Allergen Reactions    Augmentin [amoxicillin-pot clavulanate] Diarrhea    Clarithromycin Diarrhea     has a current medication list which includes the following prescription(s): accu-chek guide glucose meter, accu-chek softclix lancets, alcohol swabs, aspirin, blood sugar diagnostic, cetirizine, cholecalciferol (vitamin d3), erythromycin, famotidine, fluticasone propionate, gabapentin, hydroxyzine pamoate, lactobacillus rhamnosus gg, biotin, metoprolol succinate, mirtazapine, fish oil-omega-3 fatty acids, ondansetron, pravastatin, janumet xr, amlodipine, lisinopril, and tizanidine.      Objective:  Vitals:    04/26/23 0757   BP: (!) 140/52   Pulse: 64   Resp: 18        Physical Exam  Vitals and nursing note reviewed.   Constitutional:       General: She is not in acute distress.     Appearance: Normal appearance. She is not ill-appearing, toxic-appearing or diaphoretic.   HENT:      Head: Normocephalic and atraumatic.      Nose: Nose normal.      Mouth/Throat:       Mouth: Mucous membranes are moist.   Eyes:      Extraocular Movements: Extraocular movements intact.      Pupils: Pupils are equal, round, and reactive to light.   Cardiovascular:      Rate and Rhythm: Normal rate and regular rhythm.      Heart sounds: Normal heart sounds.   Pulmonary:      Effort: Pulmonary effort is normal. No respiratory distress.      Breath sounds: Normal breath sounds. No stridor. No wheezing or rhonchi.   Abdominal:      General: Bowel sounds are normal.      Palpations: Abdomen is soft.   Musculoskeletal:         General: No swelling or deformity. Normal range of motion.      Cervical back: Normal and normal range of motion. No spasms or tenderness. No pain with movement. Normal range of motion.      Thoracic back: Normal.      Lumbar back: No spasms, tenderness or bony tenderness. Normal range of motion. Negative right straight leg raise test and negative left straight leg raise test. No scoliosis.      Right lower leg: No edema.      Left lower leg: No edema.   Skin:     General: Skin is warm.   Neurological:      General: No focal deficit present.      Mental Status: She is alert and oriented to person, place, and time. Mental status is at baseline.      Cranial Nerves: No cranial nerve deficit.      Sensory: Sensation is intact. No sensory deficit.      Motor: No weakness.      Coordination: Coordination normal.      Gait: Gait normal.      Deep Tendon Reflexes: Reflexes are normal and symmetric.   Psychiatric:         Mood and Affect: Mood normal.         Behavior: Behavior normal.         Assessment:      1. Occipital headache          Plan:  1. reviewed  2.Addiction, Dependency, Tolerance, Opioid abuse-misuse, Death, Diversion Discussed. Overdose reversal drug Naloxone discussed.  3. Zanaflex prescribed for occasional muscle spasticity       Requested Prescriptions     Signed Prescriptions Disp Refills    tiZANidine (ZANAFLEX) 4 MG tablet 90 tablet 0     Sig: Take 1 tablet (4  mg total) by mouth 3 (three) times daily.     4. Patient to return as needed for re-evaluation     report:  Reviewed and consistent with medication use as prescribed.      The total time spent for evaluation and management on 04/26/2023 including reviewing separately obtained history, performing a medically appropriate exam and evaluation, documenting clinical information in the health record, independently interpreting results and communicating them to the patient/family/caregiver, and ordering medications/tests/procedures was between 15-29 minutes.    The above plan and management options were discussed at length with patient. Patient is in agreement with the above and verbalized understanding. It will be communicated with the referring physician via electronic record, fax, or mail.

## 2023-05-01 LAB
LEFT EYE DM RETINOPATHY: NEGATIVE
RIGHT EYE DM RETINOPATHY: NEGATIVE

## 2023-06-09 DIAGNOSIS — Z71.89 COMPLEX CARE COORDINATION: ICD-10-CM

## 2023-06-22 ENCOUNTER — TELEPHONE (OUTPATIENT)
Dept: PAIN MEDICINE | Facility: CLINIC | Age: 78
End: 2023-06-22
Payer: MEDICARE

## 2023-06-22 RX ORDER — TIZANIDINE 2 MG/1
4 TABLET ORAL EVERY 8 HOURS PRN
Qty: 90 TABLET | Refills: 0 | Status: SHIPPED | OUTPATIENT
Start: 2023-06-22 | End: 2023-07-22

## 2023-06-22 NOTE — TELEPHONE ENCOUNTER
----- Message from Erika Higgins MD sent at 6/22/2023 11:26 AM CDT -----  Regarding: RE: Medication  Will send in 2 mg tablets to patient's pharmacy  ----- Message -----  From: Jairo Munoz LPN  Sent: 6/21/2023   4:21 PM CDT  To: Erika Higgins MD  Subject: Medication                                       Patient was last seen in April 2023 and was given Zanaflex 4mg 1 tid prn.  Patient states she takes 2mg 1/2 tab daily as needed and it makes her feel completely drowsy, and light headed. Patient was said to  follow up prn. I instructed patient to hold medication. Is there something else she can try?    Attempted to call patient with no answer and was not able to leave voicemail. 6/22/23 at 12:40 pm

## 2023-06-22 NOTE — TELEPHONE ENCOUNTER
----- Message from Iriscandida Mota sent at 6/21/2023 11:01 AM CDT -----  Regarding: PT NEEDS TO SPEAK TO NURSE  PT NEEDS TO SPEAK TO NURSE REGARDING MEDS PT WAS PUT ON MUSCLE RELAXER BUT STATES THEY ARE TOO STRONG UNABLE TO FUNCTION ON MED PLEASE CALL PT BACK [phone]      I spoke with patient and sent a message to . Instructed patient to hold medication until Dr. Higgins gives further instruction.

## 2023-09-11 DIAGNOSIS — E11.9 TYPE 2 DIABETES MELLITUS WITHOUT COMPLICATION, WITHOUT LONG-TERM CURRENT USE OF INSULIN: ICD-10-CM

## 2023-09-13 RX ORDER — LANCETS
EACH MISCELLANEOUS
Qty: 100 EACH | Refills: 3 | Status: SHIPPED | OUTPATIENT
Start: 2023-09-13

## 2023-09-19 NOTE — PROGRESS NOTES
"Subjective:       Patient ID: Fatmata Tolbert is a 78 y.o. female.    Chief Complaint: General Diabetes Follow-up (Here for fu and A1C   checks sugar once daily  no complaints   needs RX for new meter)    Here today for routine evaluation and med refill.  A1c remains well controlled     Lab Results       Component                Value               Date                       HGBA1C                   6.4                 09/25/2023              Lab Results       Component                Value               Date                       HGBA1C                   6.2                 03/22/2023            Lab Results       Component                Value               Date                       MICROALBUR               8.2 (H)             09/22/2022            Lab Results       Component                Value               Date                       CHOL                     157                 09/22/2022                 CHOL                     138                 10/26/2021            Lab Results       Component                Value               Date                       HDL                      73 (H)              09/22/2022                 HDL                      71 (H)              10/26/2021            Lab Results       Component                Value               Date                       LDLCALC                  72                  09/22/2022                 LDLCALC                  52                  10/26/2021            No results found for: "DLDL"  Lab Results       Component                Value               Date                       TRIG                     61                  09/22/2022                 TRIG                     74                  10/26/2021              f1 Lab Results       Component                Value               Date                       CHOLHDL                  2.2                 09/22/2022                 CHOLHDL                  1.9                 10/26/2021          "   CMP  Sodium       Date                     Value               Ref Range           Status                04/04/2023               142                 136 - 145 mmol*     Final            ----------  Potassium       Date                     Value               Ref Range           Status                04/04/2023               4.8                 3.5 - 5.1 mmol*     Final            ----------  Chloride       Date                     Value               Ref Range           Status                04/04/2023               108 (H)             98 - 107 mmol/L     Final            ----------  CO2       Date                     Value               Ref Range           Status                04/04/2023               30                  21 - 32 mmol/L      Final            ----------  Glucose       Date                     Value               Ref Range           Status                04/04/2023               99                  74 - 106 mg/dL      Final            ----------  BUN       Date                     Value               Ref Range           Status                04/04/2023               19 (H)              7 - 18 mg/dL        Final            ----------  Creatinine       Date                     Value               Ref Range           Status                04/04/2023               0.86                0.55 - 1.02 mg*     Final            ----------  Calcium       Date                     Value               Ref Range           Status                04/04/2023               9.9                 8.5 - 10.1 mg/*     Final            ----------  Total Protein       Date                     Value               Ref Range           Status                04/04/2023               7.1                 6.4 - 8.2 g/dL      Final            ----------  Albumin       Date                     Value               Ref Range           Status                04/04/2023               3.8                 3.5 - 5.0 g/dL      Final             ----------  Bilirubin, Total       Date                     Value               Ref Range           Status                04/04/2023               0.2                 >0.0 - 1.2 mg/*     Final            ----------  Alk Phos       Date                     Value               Ref Range           Status                04/04/2023               47 (L)              55 - 142 U/L        Final            ----------  AST       Date                     Value               Ref Range           Status                04/04/2023               9 (L)               15 - 37 U/L         Final            ----------  ALT       Date                     Value               Ref Range           Status                04/04/2023               15                  13 - 56 U/L         Final            ----------  Anion Gap       Date                     Value               Ref Range           Status                04/04/2023               9                   7 - 16 mmol/L       Final            ----------  eGFR       Date                     Value               Ref Range           Status                04/04/2023               70                  >=60 mL/min/1.*     Final            ----------        Review of Systems   Constitutional:  Negative for activity change, appetite change, diaphoresis and fatigue.   HENT:  Negative for nasal congestion, facial swelling and sinus pressure/congestion.    Eyes:  Negative for visual disturbance.   Respiratory:  Negative for shortness of breath and wheezing.    Cardiovascular:  Negative for chest pain and leg swelling.   Gastrointestinal:  Negative for constipation, diarrhea, nausea and vomiting.   Endocrine: Negative for polydipsia, polyphagia and polyuria.   Genitourinary:  Negative for dysuria, frequency and urgency.   Musculoskeletal:  Negative for gait problem and myalgias.   Integumentary:  Negative for color change, rash and wound.   Neurological:  Negative for dizziness, syncope, weakness,  headaches and coordination difficulties.   Hematological:  Does not bruise/bleed easily.   Psychiatric/Behavioral:  Negative for self-injury, sleep disturbance and suicidal ideas. The patient is not nervous/anxious.          Objective:      Physical Exam  Vitals and nursing note reviewed.   Constitutional:       Appearance: Normal appearance.   HENT:      Head: Normocephalic.   Neck:      Thyroid: No thyromegaly.      Vascular: No carotid bruit.   Cardiovascular:      Rate and Rhythm: Normal rate and regular rhythm.      Heart sounds: Normal heart sounds.   Pulmonary:      Effort: Pulmonary effort is normal.      Breath sounds: Normal breath sounds.   Musculoskeletal:         General: Normal range of motion.   Skin:     General: Skin is warm and dry.   Neurological:      General: No focal deficit present.      Mental Status: She is alert and oriented to person, place, and time.   Psychiatric:         Mood and Affect: Mood normal.         Behavior: Behavior normal.         Thought Content: Thought content normal.         Judgment: Judgment normal.         1. Type 2 diabetes mellitus without complication, without long-term current use of insulin  No changed in DM  Lifestyle modifications encouraged.    -     Hemoglobin A1C, POCT  -     POCT Glucose, Hand-Held Device  -     Diabetes Digital Medicine (DDMP) Enrollment Order  -     Diabetes Digital Medicine (DDMP): Assign Onboarding Questionnaires  -     Lipid Panel; Future; Expected date: 09/25/2023  -     Microalbumin/Creatinine Ratio, Urine; Future; Expected date: 09/25/2023    2. Primary hypertension  ACE coverage   -     Hypertension Digital Medicine (HDMP) Enrollment Order  -     Hypertension Digital Medicine (HDMP): Assign Onboarding Questionnaires    3. Hyperlipidemia, unspecified hyperlipidemia type  Statin coverage     4. Gastroparesis      5. Sleep apnea, unspecified type  Cpap nightly use encouraged.

## 2023-09-20 RX ORDER — HYDROXYZINE PAMOATE 25 MG/1
25 CAPSULE ORAL EVERY 12 HOURS PRN
Status: CANCELLED | OUTPATIENT
Start: 2023-09-20

## 2023-09-20 RX ORDER — GABAPENTIN 100 MG/1
100 CAPSULE ORAL 2 TIMES DAILY
Status: CANCELLED | OUTPATIENT
Start: 2023-09-20

## 2023-09-20 RX ORDER — ONDANSETRON 4 MG/1
4 TABLET, FILM COATED ORAL EVERY 6 HOURS PRN
Qty: 90 TABLET | Refills: 0 | Status: CANCELLED | OUTPATIENT
Start: 2023-09-20

## 2023-09-25 ENCOUNTER — OFFICE VISIT (OUTPATIENT)
Dept: DIABETES SERVICES | Facility: CLINIC | Age: 78
End: 2023-09-25
Payer: MEDICARE

## 2023-09-25 VITALS
RESPIRATION RATE: 14 BRPM | DIASTOLIC BLOOD PRESSURE: 70 MMHG | HEIGHT: 66 IN | BODY MASS INDEX: 23.63 KG/M2 | SYSTOLIC BLOOD PRESSURE: 128 MMHG | HEART RATE: 85 BPM | WEIGHT: 147 LBS | OXYGEN SATURATION: 99 %

## 2023-09-25 DIAGNOSIS — K31.84 GASTROPARESIS: ICD-10-CM

## 2023-09-25 DIAGNOSIS — I10 PRIMARY HYPERTENSION: ICD-10-CM

## 2023-09-25 DIAGNOSIS — E78.5 HYPERLIPIDEMIA, UNSPECIFIED HYPERLIPIDEMIA TYPE: ICD-10-CM

## 2023-09-25 DIAGNOSIS — E11.9 TYPE 2 DIABETES MELLITUS WITHOUT COMPLICATION, WITHOUT LONG-TERM CURRENT USE OF INSULIN: Primary | ICD-10-CM

## 2023-09-25 DIAGNOSIS — G47.30 SLEEP APNEA, UNSPECIFIED TYPE: ICD-10-CM

## 2023-09-25 LAB
GLUCOSE SERPL-MCNC: 129 MG/DL (ref 70–110)
HBA1C MFR BLD: 6.4 % (ref 4.5–6.6)

## 2023-09-25 PROCEDURE — 82962 GLUCOSE BLOOD TEST: CPT | Mod: PBBFAC | Performed by: NURSE PRACTITIONER

## 2023-09-25 PROCEDURE — 99999PBSHW POCT GLUCOSE, HAND-HELD DEVICE: ICD-10-PCS | Mod: PBBFAC,,,

## 2023-09-25 PROCEDURE — 3078F DIAST BP <80 MM HG: CPT | Mod: CPTII,,, | Performed by: NURSE PRACTITIONER

## 2023-09-25 PROCEDURE — 1160F RVW MEDS BY RX/DR IN RCRD: CPT | Mod: CPTII,,, | Performed by: NURSE PRACTITIONER

## 2023-09-25 PROCEDURE — 83036 HEMOGLOBIN GLYCOSYLATED A1C: CPT | Mod: PBBFAC | Performed by: NURSE PRACTITIONER

## 2023-09-25 PROCEDURE — 1159F MED LIST DOCD IN RCRD: CPT | Mod: CPTII,,, | Performed by: NURSE PRACTITIONER

## 2023-09-25 PROCEDURE — 99214 OFFICE O/P EST MOD 30 MIN: CPT | Mod: S$PBB,,, | Performed by: NURSE PRACTITIONER

## 2023-09-25 PROCEDURE — 3074F SYST BP LT 130 MM HG: CPT | Mod: CPTII,,, | Performed by: NURSE PRACTITIONER

## 2023-09-25 PROCEDURE — 1160F PR REVIEW ALL MEDS BY PRESCRIBER/CLIN PHARMACIST DOCUMENTED: ICD-10-PCS | Mod: CPTII,,, | Performed by: NURSE PRACTITIONER

## 2023-09-25 PROCEDURE — 3288F PR FALLS RISK ASSESSMENT DOCUMENTED: ICD-10-PCS | Mod: CPTII,,, | Performed by: NURSE PRACTITIONER

## 2023-09-25 PROCEDURE — 99999PBSHW POCT HEMOGLOBIN A1C: Mod: PBBFAC,,,

## 2023-09-25 PROCEDURE — 3074F PR MOST RECENT SYSTOLIC BLOOD PRESSURE < 130 MM HG: ICD-10-PCS | Mod: CPTII,,, | Performed by: NURSE PRACTITIONER

## 2023-09-25 PROCEDURE — 3288F FALL RISK ASSESSMENT DOCD: CPT | Mod: CPTII,,, | Performed by: NURSE PRACTITIONER

## 2023-09-25 PROCEDURE — 99214 PR OFFICE/OUTPT VISIT, EST, LEVL IV, 30-39 MIN: ICD-10-PCS | Mod: S$PBB,,, | Performed by: NURSE PRACTITIONER

## 2023-09-25 PROCEDURE — 1101F PR PT FALLS ASSESS DOC 0-1 FALLS W/OUT INJ PAST YR: ICD-10-PCS | Mod: CPTII,,, | Performed by: NURSE PRACTITIONER

## 2023-09-25 PROCEDURE — 3078F PR MOST RECENT DIASTOLIC BLOOD PRESSURE < 80 MM HG: ICD-10-PCS | Mod: CPTII,,, | Performed by: NURSE PRACTITIONER

## 2023-09-25 PROCEDURE — 99215 OFFICE O/P EST HI 40 MIN: CPT | Mod: PBBFAC | Performed by: NURSE PRACTITIONER

## 2023-09-25 PROCEDURE — 1159F PR MEDICATION LIST DOCUMENTED IN MEDICAL RECORD: ICD-10-PCS | Mod: CPTII,,, | Performed by: NURSE PRACTITIONER

## 2023-09-25 PROCEDURE — 99999PBSHW POCT GLUCOSE, HAND-HELD DEVICE: Mod: PBBFAC,,,

## 2023-09-25 PROCEDURE — 1101F PT FALLS ASSESS-DOCD LE1/YR: CPT | Mod: CPTII,,, | Performed by: NURSE PRACTITIONER

## 2023-09-25 RX ORDER — BLOOD-GLUCOSE METER
1 EACH MISCELLANEOUS DAILY
Qty: 1 EACH | Refills: 0 | Status: SHIPPED | OUTPATIENT
Start: 2023-09-25

## 2023-09-25 NOTE — PATIENT INSTRUCTIONS
Be sure to bring back paperwork for jan[employer]et assistance    Ween off of caffeine for headache relief.     Snacks with 0-5 grams carbs   Hard Boiled Egg   Crystal Light, Vitamin Water, Powerade Zero   Herbal tea, unsweetened   8 oz unsweetened almond milk   2 tsp peanut butter on celery   ½ cup sugar-free Jell-O   1 sugar-free popsicle   Non starchy vegetables such as carrots or celery sticks with lowfat dressing   ½ oz lowfat cheese or string cheese   1 closed handful of nuts or tbsp of seeds, unsalted    Snacks with 15 gram carbs  . 1 small piece of fruit or . banana or . cup light canned fruit  . 3 reilly cracker squares  . 3 cups popcorn  . 5 Vanilla Wafers  . 1/2 cup low fat, no added sugar ice cream or frozen yogurt  . 1/2 turkey, ham, or chicken sandwich  . 1.2 cup fruit with 1/2 cup of cottage cheese  . 4-6 unsalted wheat crackers with 1 oz low fat cheese or 1 tbsp peanut butter  . 30 goldfish crackers  . 7-8 mini rice cakes  . 1/3 cup hummus dip with raw vegetables  . 1/2 whole wheat cleo, 1 tbsp hummus  . Mini pizza (. whole wheat English muffin, low-fat cheese, tomato sauce)  . 100 calorie snack pack  . 4-6 oz light yogurt  . 1/2 cup sugar-free pudding

## 2023-10-04 ENCOUNTER — OFFICE VISIT (OUTPATIENT)
Dept: GASTROENTEROLOGY | Facility: CLINIC | Age: 78
End: 2023-10-04
Payer: MEDICARE

## 2023-10-04 ENCOUNTER — OFFICE VISIT (OUTPATIENT)
Dept: FAMILY MEDICINE | Facility: CLINIC | Age: 78
End: 2023-10-04
Payer: MEDICARE

## 2023-10-04 VITALS
WEIGHT: 147.5 LBS | SYSTOLIC BLOOD PRESSURE: 130 MMHG | RESPIRATION RATE: 20 BRPM | BODY MASS INDEX: 23.7 KG/M2 | HEIGHT: 66 IN | TEMPERATURE: 99 F | OXYGEN SATURATION: 99 % | DIASTOLIC BLOOD PRESSURE: 70 MMHG | HEART RATE: 78 BPM

## 2023-10-04 VITALS
WEIGHT: 147.69 LBS | SYSTOLIC BLOOD PRESSURE: 140 MMHG | HEIGHT: 66 IN | BODY MASS INDEX: 23.74 KG/M2 | HEART RATE: 61 BPM | DIASTOLIC BLOOD PRESSURE: 63 MMHG

## 2023-10-04 DIAGNOSIS — Z78.0 POSTMENOPAUSAL: Primary | ICD-10-CM

## 2023-10-04 DIAGNOSIS — E55.9 VITAMIN D DEFICIENCY: ICD-10-CM

## 2023-10-04 DIAGNOSIS — E78.5 HYPERLIPIDEMIA, UNSPECIFIED HYPERLIPIDEMIA TYPE: ICD-10-CM

## 2023-10-04 DIAGNOSIS — F32.A DEPRESSION, UNSPECIFIED DEPRESSION TYPE: ICD-10-CM

## 2023-10-04 DIAGNOSIS — K21.9 GASTROESOPHAGEAL REFLUX DISEASE, UNSPECIFIED WHETHER ESOPHAGITIS PRESENT: ICD-10-CM

## 2023-10-04 DIAGNOSIS — K31.84 GASTROPARESIS: ICD-10-CM

## 2023-10-04 DIAGNOSIS — R19.7 DIARRHEA, UNSPECIFIED TYPE: Primary | ICD-10-CM

## 2023-10-04 DIAGNOSIS — Z11.59 ENCOUNTER FOR HEPATITIS C SCREENING TEST FOR LOW RISK PATIENT: ICD-10-CM

## 2023-10-04 DIAGNOSIS — I10 PRIMARY HYPERTENSION: ICD-10-CM

## 2023-10-04 DIAGNOSIS — G89.29 CHRONIC INTRACTABLE HEADACHE, UNSPECIFIED HEADACHE TYPE: ICD-10-CM

## 2023-10-04 DIAGNOSIS — J30.1 NON-SEASONAL ALLERGIC RHINITIS DUE TO POLLEN: ICD-10-CM

## 2023-10-04 DIAGNOSIS — R11.0 NAUSEA: ICD-10-CM

## 2023-10-04 DIAGNOSIS — R51.9 CHRONIC INTRACTABLE HEADACHE, UNSPECIFIED HEADACHE TYPE: ICD-10-CM

## 2023-10-04 LAB
25(OH)D3 SERPL-MCNC: 97.8 NG/ML
ALBUMIN SERPL BCP-MCNC: 3.7 G/DL (ref 3.5–5)
ALBUMIN/GLOB SERPL: 1.1 {RATIO}
ALP SERPL-CCNC: 41 U/L (ref 55–142)
ALT SERPL W P-5'-P-CCNC: 19 U/L (ref 13–56)
ANION GAP SERPL CALCULATED.3IONS-SCNC: 9 MMOL/L (ref 7–16)
AST SERPL W P-5'-P-CCNC: 12 U/L (ref 15–37)
BILIRUB SERPL-MCNC: 0.4 MG/DL (ref ?–1.2)
BUN SERPL-MCNC: 16 MG/DL (ref 7–18)
BUN/CREAT SERPL: 18 (ref 6–20)
CALCIUM SERPL-MCNC: 9.2 MG/DL (ref 8.5–10.1)
CHLORIDE SERPL-SCNC: 108 MMOL/L (ref 98–107)
CO2 SERPL-SCNC: 30 MMOL/L (ref 21–32)
CREAT SERPL-MCNC: 0.89 MG/DL (ref 0.55–1.02)
EGFR (NO RACE VARIABLE) (RUSH/TITUS): 67 ML/MIN/1.73M2
GLOBULIN SER-MCNC: 3.5 G/DL (ref 2–4)
GLUCOSE SERPL-MCNC: 108 MG/DL (ref 74–106)
HCV AB SER QL: NORMAL
POTASSIUM SERPL-SCNC: 4.8 MMOL/L (ref 3.5–5.1)
PROT SERPL-MCNC: 7.2 G/DL (ref 6.4–8.2)
SODIUM SERPL-SCNC: 142 MMOL/L (ref 136–145)

## 2023-10-04 PROCEDURE — 1160F RVW MEDS BY RX/DR IN RCRD: CPT | Mod: CPTII,,, | Performed by: NURSE PRACTITIONER

## 2023-10-04 PROCEDURE — 86803 HEPATITIS C ANTIBODY: ICD-10-PCS | Mod: ,,, | Performed by: CLINICAL MEDICAL LABORATORY

## 2023-10-04 PROCEDURE — 3288F PR FALLS RISK ASSESSMENT DOCUMENTED: ICD-10-PCS | Mod: ,,, | Performed by: FAMILY MEDICINE

## 2023-10-04 PROCEDURE — 1159F MED LIST DOCD IN RCRD: CPT | Mod: CPTII,,, | Performed by: NURSE PRACTITIONER

## 2023-10-04 PROCEDURE — 3078F DIAST BP <80 MM HG: CPT | Mod: CPTII,,, | Performed by: NURSE PRACTITIONER

## 2023-10-04 PROCEDURE — 99214 OFFICE O/P EST MOD 30 MIN: CPT | Mod: ,,, | Performed by: FAMILY MEDICINE

## 2023-10-04 PROCEDURE — 99215 OFFICE O/P EST HI 40 MIN: CPT | Mod: PBBFAC | Performed by: NURSE PRACTITIONER

## 2023-10-04 PROCEDURE — 3077F PR MOST RECENT SYSTOLIC BLOOD PRESSURE >= 140 MM HG: ICD-10-PCS | Mod: CPTII,,, | Performed by: NURSE PRACTITIONER

## 2023-10-04 PROCEDURE — 80053 COMPREHEN METABOLIC PANEL: CPT | Mod: ,,, | Performed by: CLINICAL MEDICAL LABORATORY

## 2023-10-04 PROCEDURE — 99214 PR OFFICE/OUTPT VISIT, EST, LEVL IV, 30-39 MIN: ICD-10-PCS | Mod: S$PBB,,, | Performed by: NURSE PRACTITIONER

## 2023-10-04 PROCEDURE — 1101F PR PT FALLS ASSESS DOC 0-1 FALLS W/OUT INJ PAST YR: ICD-10-PCS | Mod: ,,, | Performed by: FAMILY MEDICINE

## 2023-10-04 PROCEDURE — 1101F PT FALLS ASSESS-DOCD LE1/YR: CPT | Mod: CPTII,,, | Performed by: NURSE PRACTITIONER

## 2023-10-04 PROCEDURE — 3288F FALL RISK ASSESSMENT DOCD: CPT | Mod: CPTII,,, | Performed by: NURSE PRACTITIONER

## 2023-10-04 PROCEDURE — 3288F PR FALLS RISK ASSESSMENT DOCUMENTED: ICD-10-PCS | Mod: CPTII,,, | Performed by: NURSE PRACTITIONER

## 2023-10-04 PROCEDURE — 80053 COMPREHENSIVE METABOLIC PANEL: ICD-10-PCS | Mod: ,,, | Performed by: CLINICAL MEDICAL LABORATORY

## 2023-10-04 PROCEDURE — 1159F PR MEDICATION LIST DOCUMENTED IN MEDICAL RECORD: ICD-10-PCS | Mod: CPTII,,, | Performed by: NURSE PRACTITIONER

## 2023-10-04 PROCEDURE — 99214 PR OFFICE/OUTPT VISIT, EST, LEVL IV, 30-39 MIN: ICD-10-PCS | Mod: ,,, | Performed by: FAMILY MEDICINE

## 2023-10-04 PROCEDURE — 3078F PR MOST RECENT DIASTOLIC BLOOD PRESSURE < 80 MM HG: ICD-10-PCS | Mod: ,,, | Performed by: FAMILY MEDICINE

## 2023-10-04 PROCEDURE — 3078F DIAST BP <80 MM HG: CPT | Mod: ,,, | Performed by: FAMILY MEDICINE

## 2023-10-04 PROCEDURE — 3078F PR MOST RECENT DIASTOLIC BLOOD PRESSURE < 80 MM HG: ICD-10-PCS | Mod: CPTII,,, | Performed by: NURSE PRACTITIONER

## 2023-10-04 PROCEDURE — 99214 OFFICE O/P EST MOD 30 MIN: CPT | Mod: S$PBB,,, | Performed by: NURSE PRACTITIONER

## 2023-10-04 PROCEDURE — 3075F SYST BP GE 130 - 139MM HG: CPT | Mod: ,,, | Performed by: FAMILY MEDICINE

## 2023-10-04 PROCEDURE — 82306 VITAMIN D 25 HYDROXY: CPT | Mod: ,,, | Performed by: CLINICAL MEDICAL LABORATORY

## 2023-10-04 PROCEDURE — 82306 VITAMIN D: ICD-10-PCS | Mod: ,,, | Performed by: CLINICAL MEDICAL LABORATORY

## 2023-10-04 PROCEDURE — 86803 HEPATITIS C AB TEST: CPT | Mod: ,,, | Performed by: CLINICAL MEDICAL LABORATORY

## 2023-10-04 PROCEDURE — 3288F FALL RISK ASSESSMENT DOCD: CPT | Mod: ,,, | Performed by: FAMILY MEDICINE

## 2023-10-04 PROCEDURE — 1101F PT FALLS ASSESS-DOCD LE1/YR: CPT | Mod: ,,, | Performed by: FAMILY MEDICINE

## 2023-10-04 PROCEDURE — 3075F PR MOST RECENT SYSTOLIC BLOOD PRESS GE 130-139MM HG: ICD-10-PCS | Mod: ,,, | Performed by: FAMILY MEDICINE

## 2023-10-04 PROCEDURE — 1160F PR REVIEW ALL MEDS BY PRESCRIBER/CLIN PHARMACIST DOCUMENTED: ICD-10-PCS | Mod: CPTII,,, | Performed by: NURSE PRACTITIONER

## 2023-10-04 PROCEDURE — 1101F PR PT FALLS ASSESS DOC 0-1 FALLS W/OUT INJ PAST YR: ICD-10-PCS | Mod: CPTII,,, | Performed by: NURSE PRACTITIONER

## 2023-10-04 PROCEDURE — 3077F SYST BP >= 140 MM HG: CPT | Mod: CPTII,,, | Performed by: NURSE PRACTITIONER

## 2023-10-04 RX ORDER — LISINOPRIL 40 MG/1
40 TABLET ORAL DAILY
Qty: 90 TABLET | Refills: 1 | Status: SHIPPED | OUTPATIENT
Start: 2023-10-04 | End: 2024-01-08 | Stop reason: SDUPTHER

## 2023-10-04 RX ORDER — MIRTAZAPINE 7.5 MG/1
7.5 TABLET, FILM COATED ORAL NIGHTLY
Qty: 90 TABLET | Refills: 1 | Status: SHIPPED | OUTPATIENT
Start: 2023-10-04 | End: 2024-01-08 | Stop reason: SDUPTHER

## 2023-10-04 RX ORDER — PRAVASTATIN SODIUM 20 MG/1
20 TABLET ORAL NIGHTLY
Qty: 90 TABLET | Refills: 1 | Status: SHIPPED | OUTPATIENT
Start: 2023-10-04 | End: 2024-01-08 | Stop reason: SDUPTHER

## 2023-10-04 RX ORDER — FLUTICASONE PROPIONATE 50 MCG
2 SPRAY, SUSPENSION (ML) NASAL DAILY
Qty: 48 G | Refills: 2 | Status: SHIPPED | OUTPATIENT
Start: 2023-10-04 | End: 2024-01-08 | Stop reason: SDUPTHER

## 2023-10-04 RX ORDER — AMLODIPINE BESYLATE 5 MG/1
5 TABLET ORAL DAILY
Qty: 90 TABLET | Refills: 1 | Status: SHIPPED | OUTPATIENT
Start: 2023-10-04 | End: 2024-01-08 | Stop reason: SDUPTHER

## 2023-10-04 RX ORDER — ACETAMINOPHEN 500 MG
5000 TABLET ORAL DAILY
Qty: 90 TABLET | Refills: 1 | Status: SHIPPED | OUTPATIENT
Start: 2023-10-04 | End: 2024-01-08 | Stop reason: SDUPTHER

## 2023-10-04 RX ORDER — METOPROLOL SUCCINATE 100 MG/1
100 TABLET, EXTENDED RELEASE ORAL DAILY
Qty: 90 TABLET | Refills: 1 | Status: SHIPPED | OUTPATIENT
Start: 2023-10-04 | End: 2024-01-08 | Stop reason: SDUPTHER

## 2023-10-04 RX ORDER — METHOCARBAMOL 500 MG/1
500 TABLET, FILM COATED ORAL 2 TIMES DAILY
Qty: 180 TABLET | Refills: 1 | Status: SHIPPED | OUTPATIENT
Start: 2023-10-04 | End: 2024-01-08

## 2023-10-04 RX ORDER — CETIRIZINE HYDROCHLORIDE 10 MG/1
10 TABLET ORAL DAILY
Qty: 90 TABLET | Refills: 1 | Status: SHIPPED | OUTPATIENT
Start: 2023-10-04 | End: 2024-01-08 | Stop reason: SDUPTHER

## 2023-10-04 RX ORDER — GABAPENTIN 300 MG/1
300 CAPSULE ORAL 2 TIMES DAILY
Qty: 180 CAPSULE | Refills: 1 | Status: SHIPPED | OUTPATIENT
Start: 2023-10-04 | End: 2024-01-08

## 2023-10-04 NOTE — PATIENT INSTRUCTIONS
Please call [phone] with any questions regarding your referral.    Pt  was admitted for syncope while having dinner with Loss of consciousness  brieflly followed by an episode of vomiting and fatigue     s/p Headaches earlier in the day     PAST MEDICAL & SURGICAL HISTORY:  Syncope  Claudication of both lower extremities  Hyperlipidemia: Hyperlipidemia  Essential hypertension: Hypertension  Low back pain: Chronic lower back pain  Acute myocardial infarction: Myocardial infarction  Gastroesophageal reflux disease: GERD (gastroesophageal reflux disease)  Atherosclerosis of coronary artery: Coronary artery disease  History of loop recorder  History of back surgery    MEDICATIONS  (STANDING):  amLODIPine   Tablet 10 milliGRAM(s) Oral daily  clopidogrel Tablet 75 milliGRAM(s) Oral daily  donepezil 10 milliGRAM(s) Oral at bedtime  escitalopram 10 milliGRAM(s) Oral daily  heparin  Injectable 5000 Unit(s) SubCutaneous every 8 hours  metoprolol tartrate 12.5 milliGRAM(s) Oral two times a day  sodium chloride 0.9%. 1000 milliLiter(s) (100 mL/Hr) IV Continuous <Continuous>    MEDICATIONS  (PRN):      ICU Vital Signs Last 24 Hrs  T(C): 37.1 (19 Aug 2019 06:06), Max: 37.1 (18 Aug 2019 23:59)  T(F): 98.7 (19 Aug 2019 06:06), Max: 98.8 (18 Aug 2019 23:59)  HR: 63 (19 Aug 2019 06:30) (63 - 67)  BP: 165/82 (19 Aug 2019 06:30) (150/79 - 166/88)  BP(mean): 114 (19 Aug 2019 02:06) (114 - 114)  ABP: --  ABP(mean): --  RR: 16 (19 Aug 2019 06:30) (16 - 19)  SpO2: 100% (19 Aug 2019 06:30) (98% - 100%)      Lungs decreased breath sounds at bases  CV s1s 2    abd soft    ext stable    EKG NSR  NSSTTC      echo     May 2019  EF 60 - 65 %     Normal LVF  RVF  trace MR   CT Head   No acute intracranial hemorrhage or infarct  chronic infarct noted   r occipital and putamen regions                            10.1   7.50  )-----------( 221      ( 19 Aug 2019 05:53 )             31.7   08-19    143  |  106  |  19  ----------------------------<  95  4.4   |  25  |  1.91<H>    Ca    9.2      19 Aug 2019 05:53  Mg     1.9     08-19    CARDIAC MARKERS ( 19 Aug 2019 05:53 )  x     / <0.01 ng/mL / 55 U/L / x     / 1.2 ng/mL  CARDIAC MARKERS ( 18 Aug 2019 23:03 )  x     / <0.01 ng/mL / x     / x     / x

## 2023-10-04 NOTE — PROGRESS NOTES
Fatmata Tolbert is a 78 y.o. female here for Follow-up        PCP: Allie Andersen  Referring Provider: No referring provider defined for this encounter.     HPI:  Presents for six-month follow-up due to gastroparesis.  Patient has chronic nausea related to gastroparesis.  She does follow a low residue small frequent meal diet.  Today her main complaint is diarrhea.  States that she has diarrhea throughout the day as well as nocturnal diarrhea.  No recent antibiotic therapy.  She does have a history of C diff. last colonoscopy was 12/18/2019, recommendation to repeat in 5 years.  Denies hematochezia or melena.  Reports stool is watery at times.  States that diarrhea does keep her up at night.  Last EGD was 07/14/2022, 4 cm hiatal hernia, no H pylori.  She had labs drawn this a.m. ordered by primary care.  Results are pending.    Follow-up  Associated symptoms include nausea. Pertinent negatives include no abdominal pain, change in bowel habit, chest pain, fatigue, fever or vomiting.         ROS:  Review of Systems   Constitutional:  Negative for appetite change, fatigue, fever and unexpected weight change.   HENT:  Negative for trouble swallowing.    Respiratory:  Negative for shortness of breath.    Cardiovascular:  Negative for chest pain.   Gastrointestinal:  Positive for diarrhea and nausea. Negative for abdominal pain, blood in stool, change in bowel habit, constipation, vomiting and reflux.   Musculoskeletal:  Negative for gait problem.   Integumentary:  Negative for pallor.   Psychiatric/Behavioral:  The patient is not nervous/anxious.           PMHX:  has a past medical history of Acute superficial gastritis without hemorrhage (7/14/2022), Anxiety, Cardiomyopathy, Diabetes mellitus, type 2, Diabetes, polyneuropathy, DJD (degenerative joint disease), multiple sites, Duodenitis (7/14/2022), GERD (gastroesophageal reflux disease), HH (hiatus hernia) (7/14/2022), HTN (hypertension), Hyperlipidemia, ANNA  (obstructive sleep apnea), and Pseudophakia (02/09/2021).    PSHX:  has a past surgical history that includes Breast biopsy and Hysterectomy.    PFHX: family history includes Breast cancer in her maternal grandmother and sister; Diabetes in her mother; Heart disease in her father and sister; Hypertension in her father and sister; Kidney disease in her brother; Prostate cancer in her father; Stroke in her brother, brother, mother, and sister.    PSlHX:  reports that she has never smoked. She has never used smokeless tobacco. She reports that she does not drink alcohol and does not use drugs.        Review of patient's allergies indicates:   Allergen Reactions    Augmentin [amoxicillin-pot clavulanate] Diarrhea    Clarithromycin Diarrhea       Medication List with Changes/Refills   Current Medications    ACCU-CHEK GUIDE GLUCOSE METER MISC    1 strip by Misc.(Non-Drug; Combo Route) route once daily.    ACCU-CHEK SOFTCLIX LANCETS MISC    TEST BLOOD SUGAR ONE TIME DAILY    ALCOHOL SWABS (BD ALCOHOL SWABS) PADM    Use to cleanse the skin prior to daily glucose check    AMLODIPINE (NORVASC) 5 MG TABLET    Take 1 tablet (5 mg total) by mouth once daily.    ASPIRIN (ECOTRIN) 81 MG EC TABLET    Take 81 mg by mouth once daily.    BLOOD SUGAR DIAGNOSTIC (ACCU-CHEK GUIDE TEST STRIPS) STRP    Use to monitor glucose daily as directed.    CETIRIZINE (ZYRTEC) 10 MG TABLET    Take 1 tablet (10 mg total) by mouth once daily.    CHOLECALCIFEROL, VITAMIN D3, 125 MCG (5,000 UNIT) TAB    Take 1 tablet (5,000 Units total) by mouth once daily.    ERYTHROMYCIN (ROMYCIN) OPHTHALMIC OINTMENT    Place into the left eye.    FAMOTIDINE (PEPCID) 20 MG TABLET    Take 1 tablet (20 mg total) by mouth 2 (two) times daily.    FLUTICASONE PROPIONATE (FLONASE) 50 MCG/ACTUATION NASAL SPRAY    2 sprays (100 mcg total) by Each Nostril route once daily.    GABAPENTIN (NEURONTIN) 300 MG CAPSULE    Take 1 capsule (300 mg total) by mouth 2 (two) times daily.     "LACTOBACILLUS RHAMNOSUS GG (CULTURELLE) 10 BILLION CELL CAPSULE    Take 1 capsule by mouth once daily.    LISINOPRIL (PRINIVIL,ZESTRIL) 40 MG TABLET    Take 1 tablet (40 mg total) by mouth once daily.    SORAYA BIOTIN ORAL    Take 3,000 mcg by mouth 2 (two) times a day.    METHOCARBAMOL (ROBAXIN) 500 MG TAB    Take 1 tablet (500 mg total) by mouth 2 (two) times a day.    METOPROLOL SUCCINATE (TOPROL-XL) 100 MG 24 HR TABLET    Take 1 tablet (100 mg total) by mouth once daily.    MIRTAZAPINE (REMERON) 7.5 MG TAB    Take 1 tablet (7.5 mg total) by mouth every evening.    OMEGA-3 FATTY ACIDS/FISH OIL (FISH OIL-OMEGA-3 FATTY ACIDS) 300-1,000 MG CAPSULE    Take by mouth once daily.    ONDANSETRON (ZOFRAN) 4 MG TABLET    Take 1 tablet (4 mg total) by mouth every 6 (six) hours as needed for Nausea.    PRAVASTATIN (PRAVACHOL) 20 MG TABLET    Take 1 tablet (20 mg total) by mouth nightly.    SITAGLIPTAN-METFORMIN (JANUMET XR) 50-1,000 MG TM24    Take 2 tablets in the am po        Objective Findings:  Vital Signs:  BP (!) 140/63   Pulse 61   Ht 5' 6" (1.676 m)   Wt 67 kg (147 lb 11.2 oz)   BMI 23.84 kg/m²  Body mass index is 23.84 kg/m².    Physical Exam:  Physical Exam  Vitals and nursing note reviewed.   Constitutional:       General: She is not in acute distress.     Appearance: Normal appearance.   HENT:      Mouth/Throat:      Mouth: Mucous membranes are moist.   Cardiovascular:      Rate and Rhythm: Normal rate.   Pulmonary:      Effort: Pulmonary effort is normal.      Breath sounds: No wheezing, rhonchi or rales.   Abdominal:      General: Bowel sounds are normal. There is no distension.      Palpations: Abdomen is soft. There is no mass.      Tenderness: There is no abdominal tenderness. There is no guarding.   Skin:     General: Skin is warm and dry.      Coloration: Skin is not jaundiced or pale.   Neurological:      Mental Status: She is alert and oriented to person, place, and time.   Psychiatric:         Mood " and Affect: Mood normal.          Labs:  Lab Results   Component Value Date    WBC 5.57 09/28/2022    HGB 11.7 (L) 09/28/2022    HCT 36.0 (L) 09/28/2022    MCV 89.6 09/28/2022    RDW 13.1 09/28/2022     09/28/2022    LYMPH 40.9 09/28/2022    LYMPH 2.28 09/28/2022    MONO 8.3 (H) 09/28/2022    EOS 0.19 09/28/2022    BASO 0.02 09/28/2022     Lab Results   Component Value Date     04/04/2023    K 4.8 04/04/2023     (H) 04/04/2023    CO2 30 04/04/2023    GLU 99 04/04/2023    BUN 19 (H) 04/04/2023    CREATININE 0.86 04/04/2023    CALCIUM 9.9 04/04/2023    PROT 7.1 04/04/2023    ALBUMIN 3.8 04/04/2023    BILITOT 0.2 04/04/2023    ALKPHOS 47 (L) 04/04/2023    AST 9 (L) 04/04/2023    ALT 15 04/04/2023         Imaging: No results found.      Assessment:  Fatmata Tolbert is a 78 y.o. female here with:  1. Diarrhea, unspecified type    2. Gastroparesis    3. Gastroesophageal reflux disease, unspecified whether esophagitis present          Recommendations:  1. Stool studies  2. Do not lay down within 3 hours of eating.  Avoid spicy, greasy foods  Eat 6-8 small meals per day.  Exercise 150 minutes per week  Avoid raw fruits and vegetables  Small amount of protein and fiber at one time      Follow up in about 4 weeks (around 11/1/2023).      Order summary:  Orders Placed This Encounter    Fecal leukocytes    Giardia antigen    Enteric Pathogen Panel    C Diff Toxin by PCR    Fecal fat, qualitative    Calprotectin, Stool    Occult blood x 1, stool       Thank you for allowing me to participate in the care of Fatmata Tolbert.      NAVNEET Solares

## 2023-10-05 LAB — OCCULT BLOOD: NEGATIVE

## 2023-10-05 PROCEDURE — 82272 OCCULT BLOOD X 1, STOOL: ICD-10-PCS | Mod: QW,,, | Performed by: CLINICAL MEDICAL LABORATORY

## 2023-10-05 PROCEDURE — 82272 OCCULT BLD FECES 1-3 TESTS: CPT | Mod: QW,,, | Performed by: CLINICAL MEDICAL LABORATORY

## 2023-10-11 ENCOUNTER — TELEPHONE (OUTPATIENT)
Dept: GASTROENTEROLOGY | Facility: CLINIC | Age: 78
End: 2023-10-11
Payer: MEDICARE

## 2023-10-18 ENCOUNTER — TELEPHONE (OUTPATIENT)
Dept: FAMILY MEDICINE | Facility: CLINIC | Age: 78
End: 2023-10-18
Payer: MEDICARE

## 2023-10-18 NOTE — PROGRESS NOTES
[facility]    Chief Complaint      Chief Complaint   Patient presents with    Diabetes    Hypertension    Follow-up     6 month follow up       History of Present Illness      Fatmata Tolbert is a 78 y.o. female with chronic conditions of  has a past medical history of Acute superficial gastritis without hemorrhage, Anxiety, Cardiomyopathy, Diabetes mellitus, type 2, Diabetes, polyneuropathy, DJD (degenerative joint disease), multiple sites, Duodenitis, GERD (gastroesophageal reflux disease), HH (hiatus hernia), HTN (hypertension), Hyperlipidemia, ANNA (obstructive sleep apnea), and Pseudophakia.      The patient presents today for a six month follow up visit and medication refills for hypertension.    Diabetes  Hypoglycemia symptoms include headaches and sweats. Pertinent negatives for diabetes include no blurred vision, no chest pain, no polydipsia and no weakness.   Hypertension  This is a recurrent problem. The current episode started more than 1 year ago. The problem has been gradually improving since onset. The problem is controlled. Associated symptoms include anxiety, headaches, malaise/fatigue, peripheral edema and sweats. Pertinent negatives include no blurred vision, chest pain, neck pain, orthopnea, palpitations, PND or shortness of breath. There are no associated agents to hypertension. There are no known risk factors for coronary artery disease. Past treatments include nothing. The current treatment provides moderate improvement. There are no compliance problems.    Follow-up  Associated symptoms include headaches. Pertinent negatives include no abdominal pain, chest pain, chills, coughing, fever, neck pain, rash, sore throat or weakness.           Past Medical History:  Past Medical History:   Diagnosis Date    Acute superficial gastritis without hemorrhage 7/14/2022    Anxiety     Cardiomyopathy     Diabetes mellitus, type 2     Diabetes, polyneuropathy     DJD (degenerative joint disease),  multiple sites     Duodenitis 7/14/2022    GERD (gastroesophageal reflux disease)     HH (hiatus hernia) 7/14/2022    HTN (hypertension)     Hyperlipidemia     ANNA (obstructive sleep apnea)     Pseudophakia 02/09/2021       Past Surgical History:   has a past surgical history that includes Breast biopsy and Hysterectomy.    Social History:  Social History     Tobacco Use    Smoking status: Never    Smokeless tobacco: Never   Substance Use Topics    Alcohol use: Never    Drug use: Never       I personally reviewed all past medical, surgical, and social.     Review of Systems   Constitutional:  Positive for malaise/fatigue. Negative for chills and fever.   HENT:  Negative for ear pain and sore throat.    Eyes:  Negative for blurred vision.   Respiratory:  Negative for cough and shortness of breath.    Cardiovascular:  Negative for chest pain, palpitations, orthopnea and PND.   Gastrointestinal:  Negative for abdominal pain and constipation.   Genitourinary:  Negative for dysuria and hematuria.   Musculoskeletal:  Negative for back pain, falls and neck pain.   Skin:  Negative for itching and rash.   Neurological:  Positive for headaches. Negative for weakness.   Endo/Heme/Allergies:  Negative for polydipsia. Does not bruise/bleed easily.   Psychiatric/Behavioral:  Negative for suicidal ideas. The patient does not have insomnia.         Medications:  Outpatient Encounter Medications as of 10/4/2023   Medication Sig Note Dispense Refill    ACCU-CHEK GUIDE GLUCOSE METER Misc 1 strip by Misc.(Non-Drug; Combo Route) route once daily.  1 each 0    ACCU-CHEK SOFTCLIX LANCETS Misc TEST BLOOD SUGAR ONE TIME DAILY  100 each 3    alcohol swabs (BD ALCOHOL SWABS) PadM Use to cleanse the skin prior to daily glucose check  100 each 3    aspirin (ECOTRIN) 81 MG EC tablet Take 81 mg by mouth once daily.       blood sugar diagnostic (ACCU-CHEK GUIDE TEST STRIPS) Strp Use to monitor glucose daily as directed.  100 strip 3    famotidine  (PEPCID) 20 MG tablet Take 1 tablet (20 mg total) by mouth 2 (two) times daily.  180 tablet 3    Lactobacillus rhamnosus GG (CULTURELLE) 10 billion cell capsule Take 1 capsule by mouth once daily.       SORAYA BIOTIN ORAL Take 3,000 mcg by mouth 2 (two) times a day.       omega-3 fatty acids/fish oil (FISH OIL-OMEGA-3 FATTY ACIDS) 300-1,000 mg capsule Take by mouth once daily.       ondansetron (ZOFRAN) 4 MG tablet Take 1 tablet (4 mg total) by mouth every 6 (six) hours as needed for Nausea.  90 tablet 0    SITagliptan-metformin (JANUMET XR) 50-1,000 mg TM24 Take 2 tablets in the am po  180 tablet 3    amLODIPine (NORVASC) 5 MG tablet Take 1 tablet (5 mg total) by mouth once daily.  90 tablet 1    cetirizine (ZYRTEC) 10 MG tablet Take 1 tablet (10 mg total) by mouth once daily.  90 tablet 1    cholecalciferol, vitamin D3, 125 mcg (5,000 unit) Tab Take 1 tablet (5,000 Units total) by mouth once daily.  90 tablet 1    erythromycin (ROMYCIN) ophthalmic ointment Place into the left eye.       fluticasone propionate (FLONASE) 50 mcg/actuation nasal spray 2 sprays (100 mcg total) by Each Nostril route once daily.  48 g 2    gabapentin (NEURONTIN) 300 MG capsule Take 1 capsule (300 mg total) by mouth 2 (two) times daily.  180 capsule 1    lisinopriL (PRINIVIL,ZESTRIL) 40 MG tablet Take 1 tablet (40 mg total) by mouth once daily.  90 tablet 1    methocarbamoL (ROBAXIN) 500 MG Tab Take 1 tablet (500 mg total) by mouth 2 (two) times a day.  180 tablet 1    metoprolol succinate (TOPROL-XL) 100 MG 24 hr tablet Take 1 tablet (100 mg total) by mouth once daily.  90 tablet 1    mirtazapine (REMERON) 7.5 MG Tab Take 1 tablet (7.5 mg total) by mouth every evening.  90 tablet 1    pravastatin (PRAVACHOL) 20 MG tablet Take 1 tablet (20 mg total) by mouth nightly.  90 tablet 1    [DISCONTINUED] ACCU-CHEK GUIDE GLUCOSE METER Misc 1 strip by Misc.(Non-Drug; Combo Route) route once daily.       [DISCONTINUED] ACCU-CHEK SOFTCLIX LANCETS Misc  1 lancet by Misc.(Non-Drug; Combo Route) route once daily. Use to checks usgar one x day.  100 each 3    [DISCONTINUED] amLODIPine (NORVASC) 5 MG tablet Take 5 mg by mouth.       [DISCONTINUED] cetirizine (ZYRTEC) 10 MG tablet Take 1 tablet (10 mg total) by mouth once daily. 4/5/2023: prn 90 tablet 2    [DISCONTINUED] cholecalciferol, vitamin D3, 125 mcg (5,000 unit) Tab Take 5,000 Units by mouth once daily.       [DISCONTINUED] fluticasone propionate (FLONASE) 50 mcg/actuation nasal spray 2 sprays (100 mcg total) by Each Nostril route once daily. 4/5/2023: prn 48 g 2    [DISCONTINUED] gabapentin (NEURONTIN) 100 MG capsule Take 100 mg by mouth 2 (two) times daily.       [DISCONTINUED] hydrOXYzine pamoate (VISTARIL) 25 MG Cap Take 25 mg by mouth every 12 (twelve) hours as needed.       [DISCONTINUED] lisinopriL (PRINIVIL,ZESTRIL) 40 MG tablet Take 40 mg by mouth.       [DISCONTINUED] metoprolol succinate (TOPROL-XL) 100 MG 24 hr tablet Take 100 mg by mouth once daily.       [DISCONTINUED] mirtazapine (REMERON) 7.5 MG Tab Take 1 tablet (7.5 mg total) by mouth every evening.  90 tablet 2    [DISCONTINUED] pravastatin (PRAVACHOL) 20 MG tablet Take 1 tablet (20 mg total) by mouth nightly.  90 tablet 2     No facility-administered encounter medications on file as of 10/4/2023.       Allergies:  Review of patient's allergies indicates:   Allergen Reactions    Augmentin [amoxicillin-pot clavulanate] Diarrhea    Clarithromycin Diarrhea       Health Maintenance:  Immunization History   Administered Date(s) Administered    COVID-19, MRNA, LN-S, PF (MODERNA FULL 0.5 ML DOSE) 02/12/2021, 03/16/2021, 11/05/2021, 06/10/2022    COVID-19, mRNA, LNP-S, bivalent booster, PF (Moderna Omicron)12 + YEARS 01/13/2023    Influenza (FLUAD) - Quadrivalent - Adjuvanted - PF *Preferred* (65+) 10/24/2022    Influenza - Quadrivalent - High Dose - PF (65 years and older) 01/26/2022    Influenza - Trivalent (ADULT) 01/28/2016    Pneumococcal  "Polysaccharide - 23 Valent 10/24/2022    Zoster 01/13/2023    Zoster Recombinant 03/16/2023      Health Maintenance   Topic Date Due    TETANUS VACCINE  Never done    DEXA Scan  Never done    Eye Exam  02/09/2022    Shingles Vaccine (3 of 3) 05/11/2023    Hemoglobin A1c  03/25/2024    Lipid Panel  09/25/2024    Hepatitis C Screening  Completed        Physical Exam      Vital Signs  Temp: 98.6 °F (37 °C)  Temp Source: Oral  Pulse: 78  Resp: 20  SpO2: 99 %  BP: 130/70  BP Location: Left arm  Patient Position: Sitting  Height and Weight  Height: 5' 6" (167.6 cm)  Weight: 66.9 kg (147 lb 8 oz)  BSA (Calculated - sq m): 1.76 sq meters  BMI (Calculated): 23.8  Weight in (lb) to have BMI = 25: 154.6]    Physical Exam  Vitals and nursing note reviewed.   Constitutional:       Appearance: Normal appearance.   HENT:      Head: Normocephalic and atraumatic.      Nose: Nose normal.   Eyes:      Extraocular Movements: Extraocular movements intact.      Conjunctiva/sclera: Conjunctivae normal.      Pupils: Pupils are equal, round, and reactive to light.   Cardiovascular:      Rate and Rhythm: Normal rate and regular rhythm.      Heart sounds: Normal heart sounds.   Pulmonary:      Effort: Pulmonary effort is normal.      Breath sounds: Normal breath sounds.   Musculoskeletal:      Right lower leg: No edema.      Left lower leg: No edema.   Skin:     Findings: No rash.   Neurological:      General: No focal deficit present.      Mental Status: She is alert and oriented to person, place, and time. Mental status is at baseline.      Cranial Nerves: No cranial nerve deficit.      Gait: Gait normal.   Psychiatric:         Mood and Affect: Mood normal.         Behavior: Behavior normal.         Thought Content: Thought content normal.         Judgment: Judgment normal.          Laboratory:  CBC:  Recent Labs   Lab 07/02/21  1138 02/21/22  0832 06/07/22  0928 09/28/22  0712   WBC 4.89 5.31  --  5.57   RBC 4.09 L 4.00 L  --  4.02 L "   Hemoglobin 11.2 L 11.5 L   < > 11.7 L   Hematocrit 35.8 L 35.4 L   < > 36.0 L   Platelet Count 218 186  --  221   MCV 87.5 88.5  --  89.6   MCH 27.4 28.8  --  29.1   MCHC 31.3 L 32.5  --  32.5    < > = values in this interval not displayed.     CMP:  Recent Labs   Lab 09/28/22  0712 04/04/23  1134 10/04/23  0903   Glucose 135 H 99 108 H   Calcium 9.5 9.9 9.2   Albumin 3.8 3.8 3.7   Total Protein 6.8 7.1 7.2   Sodium 138 142 142   Potassium 4.7 4.8 4.8   CO2 30 30 30   Chloride 104 108 H 108 H   BUN 18 19 H 16   Alk Phos 51 L 47 L 41 L   ALT 22 15 19   AST 15 9 L 12 L   Bilirubin, Total 0.4 0.2 0.4     LIPIDS:  Recent Labs   Lab 10/26/21  0920 09/22/22  0715 09/28/22  0712 09/25/23  0946   TSH  --   --  0.587  --    HDL Cholesterol 71 H 73 H  --  75 H   Cholesterol 138 157  --  158   Triglycerides 74 61  --  57   LDL Calculated 52 72  --  72   Cholesterol/HDL Ratio (Risk Factor) 1.9 2.2  --  2.1   Non-HDL 67 84  --  83     TSH:  Recent Labs   Lab 09/28/22  0712   TSH 0.587     A1C:  Recent Labs   Lab 06/16/21  1333 10/26/21  0920 01/26/22  0905 09/21/22  0852 03/22/23  0832 09/25/23  0901   Hemoglobin A1C 6.6 6.6 6.4 6.4 6.2 6.4       Assessment/Plan     Fatmata Tolbert is a 78 y.o.female with:    1. Depression, unspecified depression type  - mirtazapine (REMERON) 7.5 MG Tab; Take 1 tablet (7.5 mg total) by mouth every evening.  Dispense: 90 tablet; Refill: 1    2. Hyperlipidemia, unspecified hyperlipidemia type  - pravastatin (PRAVACHOL) 20 MG tablet; Take 1 tablet (20 mg total) by mouth nightly.  Dispense: 90 tablet; Refill: 1    3. Nausea    4. Non-seasonal allergic rhinitis due to pollen  - cetirizine (ZYRTEC) 10 MG tablet; Take 1 tablet (10 mg total) by mouth once daily.  Dispense: 90 tablet; Refill: 1  - fluticasone propionate (FLONASE) 50 mcg/actuation nasal spray; 2 sprays (100 mcg total) by Each Nostril route once daily.  Dispense: 48 g; Refill: 2    5. Postmenopausal  - DXA Bone Density Axial Skeleton 1 or  more sites; Future    6. Encounter for hepatitis C screening test for low risk patient  - Hepatitis C Antibody; Future  - Hepatitis C Antibody    7. Primary hypertension  - amLODIPine (NORVASC) 5 MG tablet; Take 1 tablet (5 mg total) by mouth once daily.  Dispense: 90 tablet; Refill: 1  - lisinopriL (PRINIVIL,ZESTRIL) 40 MG tablet; Take 1 tablet (40 mg total) by mouth once daily.  Dispense: 90 tablet; Refill: 1  - metoprolol succinate (TOPROL-XL) 100 MG 24 hr tablet; Take 1 tablet (100 mg total) by mouth once daily.  Dispense: 90 tablet; Refill: 1  - Comprehensive Metabolic Panel; Future  - Comprehensive Metabolic Panel    8. Vitamin D deficiency  - cholecalciferol, vitamin D3, 125 mcg (5,000 unit) Tab; Take 1 tablet (5,000 Units total) by mouth once daily.  Dispense: 90 tablet; Refill: 1  - Vitamin D; Future  - Vitamin D    9. Chronic intractable headache, unspecified headache type  - gabapentin (NEURONTIN) 300 MG capsule; Take 1 capsule (300 mg total) by mouth 2 (two) times daily.  Dispense: 180 capsule; Refill: 1  - methocarbamoL (ROBAXIN) 500 MG Tab; Take 1 tablet (500 mg total) by mouth 2 (two) times a day.  Dispense: 180 tablet; Refill: 1  - Ambulatory referral/consult to Pain Clinic; Future       Chronic conditions status updated as per HPI.  Other than changes above, cont current medications and maintain follow up with specialists.  Return to clinic in 3 month(s) for medication refills.    Allie Andersen DO  [facility]

## 2023-10-18 NOTE — TELEPHONE ENCOUNTER
----- Message from Allie Andersen DO sent at 10/18/2023  2:00 PM CDT -----  The patient's vitamin-D is at the higher end of normal.  Please have the patient start taking vitamin-D 5000 units twice a week instead of daily.  The rest of her labs are okay.

## 2023-10-18 NOTE — PROGRESS NOTES
The patient's vitamin-D is at the higher end of normal.  Please have the patient start taking vitamin-D 5000 units twice a week instead of daily.  The rest of her labs are okay.

## 2023-10-20 DIAGNOSIS — E11.9 TYPE 2 DIABETES MELLITUS WITHOUT COMPLICATION, WITHOUT LONG-TERM CURRENT USE OF INSULIN: ICD-10-CM

## 2023-10-22 RX ORDER — ISOPROPYL ALCOHOL 70 ML/100ML
SWAB TOPICAL
Qty: 100 EACH | Refills: 3 | Status: SHIPPED | OUTPATIENT
Start: 2023-10-22

## 2023-10-22 RX ORDER — BLOOD SUGAR DIAGNOSTIC
STRIP MISCELLANEOUS
Qty: 100 STRIP | Refills: 3 | Status: SHIPPED | OUTPATIENT
Start: 2023-10-22

## 2023-11-02 ENCOUNTER — HOSPITAL ENCOUNTER (OUTPATIENT)
Dept: RADIOLOGY | Facility: HOSPITAL | Age: 78
Discharge: HOME | End: 2023-11-02
Attending: NURSE PRACTITIONER
Payer: MEDICARE

## 2023-11-02 ENCOUNTER — TELEPHONE (OUTPATIENT)
Dept: FAMILY MEDICINE | Facility: CLINIC | Age: 78
End: 2023-11-02
Payer: MEDICARE

## 2023-11-02 ENCOUNTER — OFFICE VISIT (OUTPATIENT)
Dept: GASTROENTEROLOGY | Facility: CLINIC | Age: 78
End: 2023-11-02
Payer: MEDICARE

## 2023-11-02 VITALS
DIASTOLIC BLOOD PRESSURE: 64 MMHG | WEIGHT: 149.38 LBS | HEART RATE: 64 BPM | BODY MASS INDEX: 24.01 KG/M2 | SYSTOLIC BLOOD PRESSURE: 99 MMHG | HEIGHT: 66 IN

## 2023-11-02 DIAGNOSIS — R19.7 DIARRHEA, UNSPECIFIED TYPE: ICD-10-CM

## 2023-11-02 DIAGNOSIS — K21.9 GASTROESOPHAGEAL REFLUX DISEASE, UNSPECIFIED WHETHER ESOPHAGITIS PRESENT: ICD-10-CM

## 2023-11-02 DIAGNOSIS — R19.7 DIARRHEA, UNSPECIFIED TYPE: Primary | ICD-10-CM

## 2023-11-02 DIAGNOSIS — K31.84 GASTROPARESIS: ICD-10-CM

## 2023-11-02 PROCEDURE — 74018 RADEX ABDOMEN 1 VIEW: CPT | Mod: TC

## 2023-11-02 PROCEDURE — 1101F PT FALLS ASSESS-DOCD LE1/YR: CPT | Mod: CPTII,,, | Performed by: NURSE PRACTITIONER

## 2023-11-02 PROCEDURE — 1159F PR MEDICATION LIST DOCUMENTED IN MEDICAL RECORD: ICD-10-PCS | Mod: CPTII,,, | Performed by: NURSE PRACTITIONER

## 2023-11-02 PROCEDURE — 3078F DIAST BP <80 MM HG: CPT | Mod: CPTII,,, | Performed by: NURSE PRACTITIONER

## 2023-11-02 PROCEDURE — 99215 OFFICE O/P EST HI 40 MIN: CPT | Mod: PBBFAC | Performed by: NURSE PRACTITIONER

## 2023-11-02 PROCEDURE — 99214 OFFICE O/P EST MOD 30 MIN: CPT | Mod: S$PBB,,, | Performed by: NURSE PRACTITIONER

## 2023-11-02 PROCEDURE — 3288F FALL RISK ASSESSMENT DOCD: CPT | Mod: CPTII,,, | Performed by: NURSE PRACTITIONER

## 2023-11-02 PROCEDURE — 3074F SYST BP LT 130 MM HG: CPT | Mod: CPTII,,, | Performed by: NURSE PRACTITIONER

## 2023-11-02 PROCEDURE — 3078F PR MOST RECENT DIASTOLIC BLOOD PRESSURE < 80 MM HG: ICD-10-PCS | Mod: CPTII,,, | Performed by: NURSE PRACTITIONER

## 2023-11-02 PROCEDURE — 74018 RADEX ABDOMEN 1 VIEW: CPT | Mod: 26,,, | Performed by: RADIOLOGY

## 2023-11-02 PROCEDURE — 1159F MED LIST DOCD IN RCRD: CPT | Mod: CPTII,,, | Performed by: NURSE PRACTITIONER

## 2023-11-02 PROCEDURE — 99214 PR OFFICE/OUTPT VISIT, EST, LEVL IV, 30-39 MIN: ICD-10-PCS | Mod: S$PBB,,, | Performed by: NURSE PRACTITIONER

## 2023-11-02 PROCEDURE — 74018 XR KUB: ICD-10-PCS | Mod: 26,,, | Performed by: RADIOLOGY

## 2023-11-02 PROCEDURE — 3288F PR FALLS RISK ASSESSMENT DOCUMENTED: ICD-10-PCS | Mod: CPTII,,, | Performed by: NURSE PRACTITIONER

## 2023-11-02 PROCEDURE — 3074F PR MOST RECENT SYSTOLIC BLOOD PRESSURE < 130 MM HG: ICD-10-PCS | Mod: CPTII,,, | Performed by: NURSE PRACTITIONER

## 2023-11-02 PROCEDURE — 1101F PR PT FALLS ASSESS DOC 0-1 FALLS W/OUT INJ PAST YR: ICD-10-PCS | Mod: CPTII,,, | Performed by: NURSE PRACTITIONER

## 2023-11-02 NOTE — PROGRESS NOTES
Fatmata Tolbert is a 78 y.o. female here for Follow-up        PCP: Allie Andersen  Referring Provider: No referring provider defined for this encounter.     HPI:  Presents for follow-up due to diarrhea.  Calprotectin stool was negative.  Stool for occult blood was negative.  No enteric pathogens.  C diff was canceled because patient turned in a solid stool.  Patient does have a history of C diff infection.  She reports that she continues to have diarrhea.  States that she was up all night with diarrhea.  Reports that nocturnal diarrhea is occurring frequently.  Reports rectal irritation from frequent bowel movements.  Last colonoscopy was 12/18/2019, recommendation to repeat in 5 years.  Discussed that due to ongoing diarrhea with normal stool studies that we recommend repeating a colonoscopy for colon biopsy to rule out microscopic colitis.  We also discussed the possibility of overflow diarrhea.  KUB will be done today.  She has gastroparesis.  SIBO is consideration.    Follow-up  Associated symptoms include nausea. Pertinent negatives include no abdominal pain, change in bowel habit, chest pain, fatigue, fever or vomiting.         ROS:  Review of Systems   Constitutional:  Negative for appetite change, fatigue, fever and unexpected weight change.   HENT:  Negative for trouble swallowing.    Cardiovascular:  Negative for chest pain.   Gastrointestinal:  Positive for diarrhea and nausea. Negative for abdominal pain, blood in stool, change in bowel habit, constipation, vomiting and reflux.   Musculoskeletal:  Negative for gait problem.   Integumentary:  Negative for pallor.   Psychiatric/Behavioral:  The patient is not nervous/anxious.           PMHX:  has a past medical history of Acute superficial gastritis without hemorrhage (7/14/2022), Anxiety, Cardiomyopathy, Diabetes mellitus, type 2, Diabetes, polyneuropathy, DJD (degenerative joint disease), multiple sites, Duodenitis (7/14/2022), GERD (gastroesophageal  reflux disease), HH (hiatus hernia) (7/14/2022), HTN (hypertension), Hyperlipidemia, ANNA (obstructive sleep apnea), and Pseudophakia (02/09/2021).    PSHX:  has a past surgical history that includes Breast biopsy and Hysterectomy.    PFHX: family history includes Breast cancer in her maternal grandmother and sister; Diabetes in her mother; Heart disease in her father and sister; Hypertension in her father and sister; Kidney disease in her brother; Prostate cancer in her father; Stroke in her brother, brother, mother, and sister.    PSlHX:  reports that she has never smoked. She has never used smokeless tobacco. She reports that she does not drink alcohol and does not use drugs.        Review of patient's allergies indicates:   Allergen Reactions    Augmentin [amoxicillin-pot clavulanate] Diarrhea    Clarithromycin Diarrhea       Medication List with Changes/Refills   Current Medications    ACCU-CHEK GUIDE GLUCOSE METER MISC    1 strip by Misc.(Non-Drug; Combo Route) route once daily.    ACCU-CHEK SOFTCLIX LANCETS MISC    TEST BLOOD SUGAR ONE TIME DAILY    ALCOHOL SWABS (DROPSAFE ALCOHOL PREP PADS) PADM    USE TO CLEANSE THE SKIN PRIOR TO DAILY GLUCOSE CHECK    AMLODIPINE (NORVASC) 5 MG TABLET    Take 1 tablet (5 mg total) by mouth once daily.    ASPIRIN (ECOTRIN) 81 MG EC TABLET    Take 81 mg by mouth once daily.    BLOOD SUGAR DIAGNOSTIC (ACCU-CHEK GUIDE TEST STRIPS) STRP    TEST BLOOD SUGAR EVERY DAY AS DIRECTED    CETIRIZINE (ZYRTEC) 10 MG TABLET    Take 1 tablet (10 mg total) by mouth once daily.    CHOLECALCIFEROL, VITAMIN D3, 125 MCG (5,000 UNIT) TAB    Take 1 tablet (5,000 Units total) by mouth once daily.    ERYTHROMYCIN (ROMYCIN) OPHTHALMIC OINTMENT    Place into the left eye.    FAMOTIDINE (PEPCID) 20 MG TABLET    Take 1 tablet (20 mg total) by mouth 2 (two) times daily.    FLUTICASONE PROPIONATE (FLONASE) 50 MCG/ACTUATION NASAL SPRAY    2 sprays (100 mcg total) by Each Nostril route once daily.     "GABAPENTIN (NEURONTIN) 300 MG CAPSULE    Take 1 capsule (300 mg total) by mouth 2 (two) times daily.    LACTOBACILLUS RHAMNOSUS GG (CULTURELLE) 10 BILLION CELL CAPSULE    Take 1 capsule by mouth once daily.    LISINOPRIL (PRINIVIL,ZESTRIL) 40 MG TABLET    Take 1 tablet (40 mg total) by mouth once daily.    SORAYA BIOTIN ORAL    Take 3,000 mcg by mouth 2 (two) times a day.    METHOCARBAMOL (ROBAXIN) 500 MG TAB    Take 1 tablet (500 mg total) by mouth 2 (two) times a day.    METOPROLOL SUCCINATE (TOPROL-XL) 100 MG 24 HR TABLET    Take 1 tablet (100 mg total) by mouth once daily.    MIRTAZAPINE (REMERON) 7.5 MG TAB    Take 1 tablet (7.5 mg total) by mouth every evening.    OMEGA-3 FATTY ACIDS/FISH OIL (FISH OIL-OMEGA-3 FATTY ACIDS) 300-1,000 MG CAPSULE    Take by mouth once daily.    ONDANSETRON (ZOFRAN) 4 MG TABLET    Take 1 tablet (4 mg total) by mouth every 6 (six) hours as needed for Nausea.    PRAVASTATIN (PRAVACHOL) 20 MG TABLET    Take 1 tablet (20 mg total) by mouth nightly.    SITAGLIPTAN-METFORMIN (JANUMET XR) 50-1,000 MG TM24    Take 2 tablets in the am po        Objective Findings:  Vital Signs:  BP 99/64   Pulse 64   Ht 5' 6" (1.676 m)   Wt 67.8 kg (149 lb 6.4 oz)   BMI 24.11 kg/m²  Body mass index is 24.11 kg/m².    Physical Exam:  Physical Exam  Vitals and nursing note reviewed.   Constitutional:       General: She is not in acute distress.     Appearance: Normal appearance.   HENT:      Mouth/Throat:      Mouth: Mucous membranes are moist.   Cardiovascular:      Rate and Rhythm: Normal rate.   Pulmonary:      Effort: Pulmonary effort is normal.      Breath sounds: No wheezing, rhonchi or rales.   Abdominal:      General: Bowel sounds are normal. There is no distension.      Palpations: Abdomen is soft. There is no mass.      Tenderness: There is no abdominal tenderness. There is no guarding.   Skin:     General: Skin is warm and dry.      Coloration: Skin is not jaundiced or pale.   Neurological:      " Mental Status: She is alert and oriented to person, place, and time.   Psychiatric:         Mood and Affect: Mood normal.          Labs:  Lab Results   Component Value Date    WBC 5.57 09/28/2022    HGB 11.7 (L) 09/28/2022    HCT 36.0 (L) 09/28/2022    MCV 89.6 09/28/2022    RDW 13.1 09/28/2022     09/28/2022    LYMPH 40.9 09/28/2022    LYMPH 2.28 09/28/2022    MONO 8.3 (H) 09/28/2022    EOS 0.19 09/28/2022    BASO 0.02 09/28/2022     Lab Results   Component Value Date     10/04/2023    K 4.8 10/04/2023     (H) 10/04/2023    CO2 30 10/04/2023     (H) 10/04/2023    BUN 16 10/04/2023    CREATININE 0.89 10/04/2023    CALCIUM 9.2 10/04/2023    PROT 7.2 10/04/2023    ALBUMIN 3.7 10/04/2023    BILITOT 0.4 10/04/2023    ALKPHOS 41 (L) 10/04/2023    AST 12 (L) 10/04/2023    ALT 19 10/04/2023         Imaging: No results found.      Assessment:  Fatmata Tolbert is a 78 y.o. female here with:  1. Diarrhea, unspecified type    2. Gastroparesis    3. Gastroesophageal reflux disease, unspecified whether esophagitis present          Recommendations:  1. KUB  2. Schedule colonoscopy  3. Consider trial of Flagyl or Xifaxan for SIBO.  Would avoid other antibiotics due to the patient's history of C diff    No follow-ups on file.      Order summary:  Orders Placed This Encounter    X-Ray KUB    Colonoscopy       Thank you for allowing me to participate in the care of Fatmata Tolbert.      NAVNEET Solares

## 2023-11-02 NOTE — TELEPHONE ENCOUNTER
Notified patient of upcoming appointment for DEXA scan located at [facility], on 11/10/2023 at 10:40 AM. Instructed to call [phone] (Imaging) to reschedule appointment, if needed. Patient voiced understanding.

## 2023-11-03 ENCOUNTER — TELEPHONE (OUTPATIENT)
Dept: GASTROENTEROLOGY | Facility: CLINIC | Age: 78
End: 2023-11-03
Payer: MEDICARE

## 2023-11-03 DIAGNOSIS — R19.7 DIARRHEA, UNSPECIFIED TYPE: Primary | ICD-10-CM

## 2023-11-03 RX ORDER — METRONIDAZOLE 500 MG/1
500 TABLET ORAL EVERY 12 HOURS
Qty: 14 TABLET | Refills: 0 | Status: SHIPPED | OUTPATIENT
Start: 2023-11-03 | End: 2023-11-10

## 2023-11-03 NOTE — TELEPHONE ENCOUNTER
Results and recommendations called to patient. Agreeable to try flagyl. Wants sent to [facility] on [address].            ----- Message from MELI Yeh sent at 11/3/2023  8:20 AM CDT -----  Bilateral renal stones. No change. Offer Flagyl for one week due to diarrhea. Possible SIBO diarrhea due to gastroparesis.

## 2023-11-10 ENCOUNTER — HOSPITAL ENCOUNTER (OUTPATIENT)
Dept: RADIOLOGY | Facility: HOSPITAL | Age: 78
Discharge: HOME | End: 2023-11-10
Attending: FAMILY MEDICINE
Payer: MEDICARE

## 2023-11-10 DIAGNOSIS — Z78.0 POSTMENOPAUSAL: ICD-10-CM

## 2023-11-10 PROCEDURE — 77080 DXA BONE DENSITY AXIAL: CPT | Mod: 26,,, | Performed by: RADIOLOGY

## 2023-11-10 PROCEDURE — 77080 DXA BONE DENSITY AXIAL SKELETON 1 OR MORE SITES: ICD-10-PCS | Mod: 26,,, | Performed by: RADIOLOGY

## 2023-11-10 PROCEDURE — 77080 DXA BONE DENSITY AXIAL: CPT | Mod: TC

## 2023-11-14 ENCOUNTER — TELEPHONE (OUTPATIENT)
Dept: FAMILY MEDICINE | Facility: CLINIC | Age: 78
End: 2023-11-14
Payer: MEDICARE

## 2023-11-14 NOTE — TELEPHONE ENCOUNTER
----- Message from Allie Andersen DO sent at 11/14/2023  4:40 PM CST -----  Regarding: Results  Please notify patient.  Thank you.  ----- Message -----  From: June Herrera LPN  Sent: 11/13/2023   1:03 PM CST  To: Allie Andersen DO    Osteopenia     Recommendations:     1. Encourage adequate intake of calcium and vitamin D.     2. Encourage regular weight-bearing and muscle strengthening exercises.     3. Consider hormone replacement therapy if clinically appropriate.     4. Consider antiresorptive therapy.     5. Followup BMD every 1-2 years.       ----- Message -----  From: Interface, Rad Results In  Sent: 11/10/2023  11:10 AM CST  To: Allie Andersen DO

## 2024-01-03 ENCOUNTER — HOSPITAL ENCOUNTER (OUTPATIENT)
Dept: GASTROENTEROLOGY | Facility: HOSPITAL | Age: 79
Discharge: HOME | End: 2024-01-03
Attending: NURSE PRACTITIONER
Payer: MEDICARE

## 2024-01-03 ENCOUNTER — ANESTHESIA (OUTPATIENT)
Dept: GASTROENTEROLOGY | Facility: HOSPITAL | Age: 79
End: 2024-01-03
Payer: MEDICARE

## 2024-01-03 ENCOUNTER — ANESTHESIA EVENT (OUTPATIENT)
Dept: GASTROENTEROLOGY | Facility: HOSPITAL | Age: 79
End: 2024-01-03
Payer: MEDICARE

## 2024-01-03 VITALS
TEMPERATURE: 98 F | SYSTOLIC BLOOD PRESSURE: 96 MMHG | DIASTOLIC BLOOD PRESSURE: 39 MMHG | OXYGEN SATURATION: 100 % | RESPIRATION RATE: 12 BRPM | HEART RATE: 62 BPM

## 2024-01-03 DIAGNOSIS — K57.30 DIVERTICULOSIS OF COLON: ICD-10-CM

## 2024-01-03 DIAGNOSIS — R19.7 DIARRHEA, UNSPECIFIED TYPE: ICD-10-CM

## 2024-01-03 DIAGNOSIS — Z86.010 HISTORY OF COLON POLYPS: ICD-10-CM

## 2024-01-03 DIAGNOSIS — Z12.11 SCREENING FOR MALIGNANT NEOPLASM OF COLON: Primary | ICD-10-CM

## 2024-01-03 PROBLEM — Z86.0100 HISTORY OF COLON POLYPS: Status: ACTIVE | Noted: 2024-01-03

## 2024-01-03 PROCEDURE — 27000284 HC CANNULA NASAL: Performed by: NURSE ANESTHETIST, CERTIFIED REGISTERED

## 2024-01-03 PROCEDURE — 88360 TUMOR IMMUNOHISTOCHEM/MANUAL: CPT | Mod: 26,,, | Performed by: PATHOLOGY

## 2024-01-03 PROCEDURE — 88305 TISSUE EXAM BY PATHOLOGIST: CPT | Mod: TC,SUR,59 | Performed by: INTERNAL MEDICINE

## 2024-01-03 PROCEDURE — 37000008 HC ANESTHESIA 1ST 15 MINUTES

## 2024-01-03 PROCEDURE — 63600175 PHARM REV CODE 636 W HCPCS: Performed by: NURSE ANESTHETIST, CERTIFIED REGISTERED

## 2024-01-03 PROCEDURE — 25000003 PHARM REV CODE 250: Performed by: NURSE ANESTHETIST, CERTIFIED REGISTERED

## 2024-01-03 PROCEDURE — 88305 TISSUE EXAM BY PATHOLOGIST: CPT | Mod: 26,,, | Performed by: PATHOLOGY

## 2024-01-03 PROCEDURE — 45380 COLONOSCOPY AND BIOPSY: CPT | Performed by: INTERNAL MEDICINE

## 2024-01-03 PROCEDURE — 45380 COLONOSCOPY AND BIOPSY: CPT | Mod: ,,, | Performed by: INTERNAL MEDICINE

## 2024-01-03 PROCEDURE — D9220A PRA ANESTHESIA: Mod: ,,, | Performed by: NURSE ANESTHETIST, CERTIFIED REGISTERED

## 2024-01-03 PROCEDURE — 27201423 OPTIME MED/SURG SUP & DEVICES STERILE SUPPLY

## 2024-01-03 RX ORDER — PROPOFOL 10 MG/ML
VIAL (ML) INTRAVENOUS
Status: DISCONTINUED | OUTPATIENT
Start: 2024-01-03 | End: 2024-01-03

## 2024-01-03 RX ORDER — LIDOCAINE HYDROCHLORIDE 20 MG/ML
INJECTION, SOLUTION EPIDURAL; INFILTRATION; INTRACAUDAL; PERINEURAL
Status: DISCONTINUED | OUTPATIENT
Start: 2024-01-03 | End: 2024-01-03

## 2024-01-03 RX ORDER — SODIUM CHLORIDE 0.9 % (FLUSH) 0.9 %
10 SYRINGE (ML) INJECTION
Status: DISCONTINUED | OUTPATIENT
Start: 2024-01-03 | End: 2024-01-04 | Stop reason: HOSPADM

## 2024-01-03 RX ADMIN — PROPOFOL 25 MG: 10 INJECTION, EMULSION INTRAVENOUS at 09:01

## 2024-01-03 RX ADMIN — PROPOFOL 75 MG: 10 INJECTION, EMULSION INTRAVENOUS at 09:01

## 2024-01-03 RX ADMIN — LIDOCAINE HYDROCHLORIDE 50 MG: 20 INJECTION, SOLUTION INTRAVENOUS at 09:01

## 2024-01-03 RX ADMIN — SODIUM CHLORIDE: 9 INJECTION, SOLUTION INTRAVENOUS at 09:01

## 2024-01-03 NOTE — ANESTHESIA POSTPROCEDURE EVALUATION
Anesthesia Post Evaluation    Patient: Fatmata Tolbert    Procedure(s) Performed: * No procedures listed *    Final Anesthesia Type: general      Patient location during evaluation: GI PACU  Patient participation: Yes- Able to Participate  Level of consciousness: awake and alert  Post-procedure vital signs: reviewed and stable  Pain management: adequate  Airway patency: patent    PONV status at discharge: No PONV  Anesthetic complications: no      Cardiovascular status: blood pressure returned to baseline and hemodynamically stable  Respiratory status: spontaneous ventilation  Hydration status: euvolemic  Follow-up not needed.  Comments: Refer to nursing notes for pain/russ score upon discharge from recovery.              Vitals Value Taken Time   BP 96/39 01/03/24 1028   Temp 97F 01/03/24 1248   Pulse 62 01/03/24 1028   Resp 12 01/03/24 1028   SpO2 100 % 01/03/24 1028         Event Time   Out of Recovery 10:33:38         Pain/Russ Score: Russ Score: 8 (1/3/2024 10:02 AM)

## 2024-01-03 NOTE — ANESTHESIA PREPROCEDURE EVALUATION
01/03/2024  Fatmata Tolbert is a 78 y.o., female.      Pre-op Assessment    I have reviewed the Patient Summary Reports.     I have reviewed the Nursing Notes. I have reviewed the NPO Status.   I have reviewed the Medications.     Review of Systems  Anesthesia Hx:  No problems with previous Anesthesia                    Physical Exam  General: Well nourished, Cooperative, Alert and Oriented    Airway:  Mallampati: II   Mouth Opening: Normal  TM Distance: Normal  Tongue: Normal  Neck ROM: Normal ROM    Dental:  Intact        Anesthesia Plan  Type of Anesthesia, risks & benefits discussed:    Anesthesia Type: Gen Natural Airway  Intra-op Monitoring Plan: Standard ASA Monitors  Post Op Pain Control Plan: multimodal analgesia  Induction:  IV  Informed Consent: Informed consent signed with the Patient and all parties understand the risks and agree with anesthesia plan.  All questions answered. Patient consented to blood products? Yes  ASA Score: 3  Day of Surgery Review of History & Physical: I have interviewed and examined the patient. I have reviewed the patient's H&P dated: There are no significant changes.     Ready For Surgery From Anesthesia Perspective.     .  Past Medical History:   Diagnosis Date    Acute superficial gastritis without hemorrhage 7/14/2022    Anxiety     Cardiomyopathy     Diabetes mellitus, type 2     Diabetes, polyneuropathy     DJD (degenerative joint disease), multiple sites     Duodenitis 7/14/2022    GERD (gastroesophageal reflux disease)     HH (hiatus hernia) 7/14/2022    HTN (hypertension)     Hyperlipidemia     ANNA (obstructive sleep apnea)     Pseudophakia 02/09/2021       Past Surgical History:   Procedure Laterality Date    BREAST BIOPSY      HYSTERECTOMY         Family History   Problem Relation Age of Onset    Breast cancer Sister     Hypertension Sister     Heart disease  Sister     Diabetes Mother     Stroke Mother     Heart disease Father     Hypertension Father     Prostate cancer Father     Stroke Brother     Breast cancer Maternal Grandmother     Stroke Sister     Stroke Brother     Kidney disease Brother        Social History     Socioeconomic History    Marital status:    Tobacco Use    Smoking status: Never    Smokeless tobacco: Never   Substance and Sexual Activity    Alcohol use: Never    Drug use: Never    Sexual activity: Not Currently       Current Outpatient Medications   Medication Sig Dispense Refill    SITagliptan-metformin (JANUMET XR) 50-1,000 mg TM24 Take 2 tablets in the am po 180 tablet 3    ACCU-CHEK GUIDE GLUCOSE METER Misc 1 strip by Misc.(Non-Drug; Combo Route) route once daily. 1 each 0    ACCU-CHEK SOFTCLIX LANCETS Misc TEST BLOOD SUGAR ONE TIME DAILY 100 each 3    alcohol swabs (DROPSAFE ALCOHOL PREP PADS) PadM USE TO CLEANSE THE SKIN PRIOR TO DAILY GLUCOSE CHECK 100 each 3    amLODIPine (NORVASC) 5 MG tablet Take 1 tablet (5 mg total) by mouth once daily. 90 tablet 1    aspirin (ECOTRIN) 81 MG EC tablet Take 81 mg by mouth once daily.      blood sugar diagnostic (ACCU-CHEK GUIDE TEST STRIPS) Str TEST BLOOD SUGAR EVERY DAY AS DIRECTED 100 strip 3    cetirizine (ZYRTEC) 10 MG tablet Take 1 tablet (10 mg total) by mouth once daily. 90 tablet 1    cholecalciferol, vitamin D3, 125 mcg (5,000 unit) Tab Take 1 tablet (5,000 Units total) by mouth once daily. 90 tablet 1    erythromycin (ROMYCIN) ophthalmic ointment Place into the left eye.      famotidine (PEPCID) 20 MG tablet Take 1 tablet (20 mg total) by mouth 2 (two) times daily. 180 tablet 3    fluticasone propionate (FLONASE) 50 mcg/actuation nasal spray 2 sprays (100 mcg total) by Each Nostril route once daily. 48 g 2    gabapentin (NEURONTIN) 300 MG capsule Take 1 capsule (300 mg total) by mouth 2 (two) times daily. 180 capsule 1    Lactobacillus rhamnosus GG (CULTURELLE) 10 billion cell capsule  Take 1 capsule by mouth once daily.      lisinopriL (PRINIVIL,ZESTRIL) 40 MG tablet Take 1 tablet (40 mg total) by mouth once daily. 90 tablet 1    SORAYA BIOTIN ORAL Take 3,000 mcg by mouth 2 (two) times a day.      methocarbamoL (ROBAXIN) 500 MG Tab Take 1 tablet (500 mg total) by mouth 2 (two) times a day. 180 tablet 1    metoprolol succinate (TOPROL-XL) 100 MG 24 hr tablet Take 1 tablet (100 mg total) by mouth once daily. 90 tablet 1    mirtazapine (REMERON) 7.5 MG Tab Take 1 tablet (7.5 mg total) by mouth every evening. 90 tablet 1    omega-3 fatty acids/fish oil (FISH OIL-OMEGA-3 FATTY ACIDS) 300-1,000 mg capsule Take by mouth once daily.      ondansetron (ZOFRAN) 4 MG tablet Take 1 tablet (4 mg total) by mouth every 6 (six) hours as needed for Nausea. 90 tablet 0    pravastatin (PRAVACHOL) 20 MG tablet Take 1 tablet (20 mg total) by mouth nightly. 90 tablet 1     No current facility-administered medications for this encounter.       Review of patient's allergies indicates:   Allergen Reactions    Augmentin [amoxicillin-pot clavulanate] Diarrhea    Clarithromycin Diarrhea      Patient Active Problem List   Diagnosis    Hypertension    Chest pain    Chronic systolic heart failure    Displacement of urinary electronic stimulator device    Dyspnea    Fecal incontinence    Unstable angina    Type 2 diabetes mellitus    GERD (gastroesophageal reflux disease)    Clostridium difficile diarrhea    Gastroparesis    Normocytic anemia    Otalgia of right ear    Hyperlipidemia    Sleep apnea    Nausea    HH (hiatus hernia)    Acute superficial gastritis without hemorrhage    Duodenitis    Pain in right foot    Non-ischemic cardiomyopathy    NSVT (nonsustained ventricular tachycardia)    PVC's (premature ventricular contractions)    S/P radiofrequency ablation operation for arrhythmia    Dependence on other enabling machines and devices    Occipital headache    Rebound headache    On long term drug therapy    Diarrhea

## 2024-01-03 NOTE — TRANSFER OF CARE
Anesthesia Transfer of Care Note    Patient: Fatmata Tolbert    Procedure(s) Performed: * No procedures listed *    Patient location: GI    Anesthesia Type: general    Transport from OR: Transported from OR on room air with adequate spontaneous ventilation    Post pain: adequate analgesia    Post assessment: no apparent anesthetic complications    Post vital signs: stable    Level of consciousness: responds to stimulation    Nausea/Vomiting: no nausea/vomiting    Complications: none    Transfer of care protocol was followed      Last vitals: Visit Vitals  BP (!) 100/42   Pulse 69   Temp 36.8 °C (98.2 °F) (Oral)   Resp 16   SpO2 100%

## 2024-01-03 NOTE — DISCHARGE INSTRUCTIONS
Procedure Date  1/3/24     Impression  Overall Impression:   Performed forceps biopsies in the ascending colon and transverse colon  Diverticulosis in the ascending colon, descending colon and sigmoid colon  Scattered pan-diverticulosis was noted. No colon polyps were seen. Biopsies were obtained from the ascending and transverse colon due to diarrhea.     Recommendation    Repeat colonoscopy in 5 years      Discharge: disp: DC to home. High fiber diet. No driving x 24 hours. F/U with PCP as scheduled.  Dx: History of colon polyps, diarrhea, colon diverticulosis     Indication  Diarrhea, unspecified type; history of colon polyps

## 2024-01-03 NOTE — H&P
[facility]  Gastroenterology  H&P    Patient Name: Fatmata Tolbert  MRN: [id number]  Admission Date: 1/3/2024  Code Status: Prior    Attending Provider: Tessa Hastings FNP   Primary Care Physician: Allie Andersen DO  Principal Problem:<principal problem not specified>    Subjective:     History of Present Illness: Pt has diarrhea and history of colon polyps. Her last colonoscopy was 2019.    Past Medical History:   Diagnosis Date    Acute superficial gastritis without hemorrhage 7/14/2022    Anxiety     Cardiomyopathy     Diabetes mellitus, type 2     Diabetes, polyneuropathy     DJD (degenerative joint disease), multiple sites     Duodenitis 7/14/2022    GERD (gastroesophageal reflux disease)     HH (hiatus hernia) 7/14/2022    HTN (hypertension)     Hyperlipidemia     ANNA (obstructive sleep apnea)     Pseudophakia 02/09/2021       Past Surgical History:   Procedure Laterality Date    BREAST BIOPSY      HYSTERECTOMY         Review of patient's allergies indicates:   Allergen Reactions    Augmentin [amoxicillin-pot clavulanate] Diarrhea    Clarithromycin Diarrhea     Family History       Problem Relation (Age of Onset)    Breast cancer Sister, Maternal Grandmother    Diabetes Mother    Heart disease Sister, Father    Hypertension Sister, Father    Kidney disease Brother    Prostate cancer Father    Stroke Mother, Brother, Sister, Brother          Tobacco Use    Smoking status: Never    Smokeless tobacco: Never   Substance and Sexual Activity    Alcohol use: Never    Drug use: Never    Sexual activity: Not Currently     Review of Systems   Respiratory: Negative.     Cardiovascular: Negative.    Gastrointestinal:  Positive for diarrhea.     Objective:     Vital Signs (Most Recent):  Pulse: 66 (01/03/24 0858)  Resp: 14 (01/03/24 0858)  BP: (!) 102/47 (01/03/24 0858)  SpO2: 99 % (01/03/24 0858) Vital Signs (24h Range):  Pulse:  [66] 66  Resp:  [14] 14  SpO2:  [99 %] 99 %  BP: (102)/(47) 102/47        There  "is no height or weight on file to calculate BMI.    No intake or output data in the 24 hours ending 01/03/24 0938    Lines/Drains/Airways       Peripheral Intravenous Line  Duration                  Peripheral IV - Single Lumen 01/03/24 0909 22 G Left Forearm <1 day                    Physical Exam  Vitals reviewed.   Constitutional:       General: She is not in acute distress.     Appearance: Normal appearance. She is well-developed. She is not ill-appearing.   HENT:      Head: Normocephalic and atraumatic.      Nose: Nose normal.   Eyes:      Pupils: Pupils are equal, round, and reactive to light.   Cardiovascular:      Rate and Rhythm: Normal rate and regular rhythm.   Pulmonary:      Effort: Pulmonary effort is normal.      Breath sounds: Normal breath sounds. No wheezing.   Abdominal:      General: Abdomen is flat. Bowel sounds are normal. There is no distension.      Palpations: Abdomen is soft.      Tenderness: There is no abdominal tenderness. There is no guarding.   Skin:     General: Skin is warm and dry.      Coloration: Skin is not jaundiced.   Neurological:      Mental Status: She is alert.   Psychiatric:         Attention and Perception: Attention normal.         Mood and Affect: Affect normal.         Speech: Speech normal.         Behavior: Behavior is cooperative.      Comments: Pt was calm while speaking.         Significant Labs:  CBC: No results for input(s): "WBC", "HGB", "HCT", "PLT" in the last 48 hours.  CMP: No results for input(s): "GLU", "CALCIUM", "ALBUMIN", "PROT", "NA", "K", "CO2", "CL", "BUN", "CREATININE", "ALKPHOS", "ALT", "AST", "BILITOT" in the last 48 hours.    Significant Imaging:  Imaging results within the past 24 hours have been reviewed.    Assessment/Plan:     There are no hospital problems to display for this patient.        Imp: history of colon polyps, diarrhea  Plan: colonoscopy    Jimenez Olvera MD  Gastroenterology  [facility]  "

## 2024-01-04 LAB
DHEA SERPL-MCNC: NORMAL
ESTROGEN SERPL-MCNC: NORMAL PG/ML
INSULIN SERPL-ACNC: NORMAL U[IU]/ML
LAB AP GROSS DESCRIPTION: NORMAL
LAB AP LABORATORY NOTES: NORMAL
T3RU NFR SERPL: NORMAL %

## 2024-01-04 NOTE — PROGRESS NOTES
Colon biopsies for diarrhea showed normal colon tissue. Recommend: follow-up in GI clinic; repeat colonoscopy in 5 years for history of colon polyps.

## 2024-01-05 ENCOUNTER — TELEPHONE (OUTPATIENT)
Dept: GASTROENTEROLOGY | Facility: CLINIC | Age: 79
End: 2024-01-05
Payer: MEDICARE

## 2024-01-05 NOTE — TELEPHONE ENCOUNTER
----- Message from Jimenez Olvera MD sent at 1/4/2024  3:09 PM CST -----  Colon biopsies for diarrhea showed normal colon tissue. Recommend: follow-up in GI clinic; repeat colonoscopy in 5 years for history of colon polyps.

## 2024-01-05 NOTE — TELEPHONE ENCOUNTER
Results and recommendations called to patient. Verbalized understanding.          ----- Message from Jimenez Olvera MD sent at 1/4/2024  3:09 PM CST -----  Colon biopsies for diarrhea showed normal colon tissue. Recommend: follow-up in GI clinic; repeat colonoscopy in 5 years for history of colon polyps.

## 2024-01-08 ENCOUNTER — OFFICE VISIT (OUTPATIENT)
Dept: FAMILY MEDICINE | Facility: CLINIC | Age: 79
End: 2024-01-08
Payer: MEDICARE

## 2024-01-08 VITALS
WEIGHT: 149 LBS | HEART RATE: 70 BPM | SYSTOLIC BLOOD PRESSURE: 132 MMHG | BODY MASS INDEX: 23.95 KG/M2 | OXYGEN SATURATION: 97 % | DIASTOLIC BLOOD PRESSURE: 84 MMHG | HEIGHT: 66 IN

## 2024-01-08 DIAGNOSIS — I47.29 NSVT (NONSUSTAINED VENTRICULAR TACHYCARDIA): ICD-10-CM

## 2024-01-08 DIAGNOSIS — E55.9 VITAMIN D DEFICIENCY: ICD-10-CM

## 2024-01-08 DIAGNOSIS — E11.9 TYPE 2 DIABETES MELLITUS WITHOUT COMPLICATION, WITHOUT LONG-TERM CURRENT USE OF INSULIN: Primary | ICD-10-CM

## 2024-01-08 DIAGNOSIS — R11.0 NAUSEA: ICD-10-CM

## 2024-01-08 DIAGNOSIS — E78.00 PURE HYPERCHOLESTEROLEMIA: ICD-10-CM

## 2024-01-08 DIAGNOSIS — I10 PRIMARY HYPERTENSION: ICD-10-CM

## 2024-01-08 DIAGNOSIS — F32.A DEPRESSION, UNSPECIFIED DEPRESSION TYPE: ICD-10-CM

## 2024-01-08 DIAGNOSIS — Z23 NEED FOR PNEUMOCOCCAL VACCINE: ICD-10-CM

## 2024-01-08 DIAGNOSIS — J30.1 NON-SEASONAL ALLERGIC RHINITIS DUE TO POLLEN: ICD-10-CM

## 2024-01-08 DIAGNOSIS — I50.22 CHRONIC SYSTOLIC HEART FAILURE: ICD-10-CM

## 2024-01-08 LAB
ALBUMIN SERPL BCP-MCNC: 4 G/DL (ref 3.5–5)
ALBUMIN/GLOB SERPL: 1.1 {RATIO}
ALP SERPL-CCNC: 39 U/L (ref 55–142)
ALT SERPL W P-5'-P-CCNC: 16 U/L (ref 13–56)
ANION GAP SERPL CALCULATED.3IONS-SCNC: 11 MMOL/L (ref 7–16)
AST SERPL W P-5'-P-CCNC: 14 U/L (ref 15–37)
BILIRUB SERPL-MCNC: 0.4 MG/DL (ref ?–1.2)
BUN SERPL-MCNC: 13 MG/DL (ref 7–18)
BUN/CREAT SERPL: 16 (ref 6–20)
CALCIUM SERPL-MCNC: 10 MG/DL (ref 8.5–10.1)
CHLORIDE SERPL-SCNC: 107 MMOL/L (ref 98–107)
CO2 SERPL-SCNC: 29 MMOL/L (ref 21–32)
CREAT SERPL-MCNC: 0.8 MG/DL (ref 0.55–1.02)
CREAT UR-MCNC: 81 MG/DL (ref 28–219)
EGFR (NO RACE VARIABLE) (RUSH/TITUS): 76 ML/MIN/1.73M2
EST. AVERAGE GLUCOSE BLD GHB EST-MCNC: 131 MG/DL
GLOBULIN SER-MCNC: 3.5 G/DL (ref 2–4)
GLUCOSE SERPL-MCNC: 87 MG/DL (ref 74–106)
HBA1C MFR BLD HPLC: 6.2 % (ref 4.5–6.6)
MICROALBUMIN UR-MCNC: 9.3 MG/DL (ref 0–2.8)
MICROALBUMIN/CREAT RATIO PNL UR: 114.8 MG/G (ref 0–30)
POTASSIUM SERPL-SCNC: 4.2 MMOL/L (ref 3.5–5.1)
PROT SERPL-MCNC: 7.5 G/DL (ref 6.4–8.2)
SODIUM SERPL-SCNC: 143 MMOL/L (ref 136–145)

## 2024-01-08 PROCEDURE — 3075F SYST BP GE 130 - 139MM HG: CPT | Mod: ,,, | Performed by: FAMILY MEDICINE

## 2024-01-08 PROCEDURE — 83036 HEMOGLOBIN GLYCOSYLATED A1C: CPT | Mod: ,,, | Performed by: CLINICAL MEDICAL LABORATORY

## 2024-01-08 PROCEDURE — 3288F FALL RISK ASSESSMENT DOCD: CPT | Mod: ,,, | Performed by: FAMILY MEDICINE

## 2024-01-08 PROCEDURE — G0009 ADMIN PNEUMOCOCCAL VACCINE: HCPCS | Mod: ,,, | Performed by: FAMILY MEDICINE

## 2024-01-08 PROCEDURE — 1101F PT FALLS ASSESS-DOCD LE1/YR: CPT | Mod: ,,, | Performed by: FAMILY MEDICINE

## 2024-01-08 PROCEDURE — 82570 ASSAY OF URINE CREATININE: CPT | Mod: ,,, | Performed by: CLINICAL MEDICAL LABORATORY

## 2024-01-08 PROCEDURE — 1159F MED LIST DOCD IN RCRD: CPT | Mod: ,,, | Performed by: FAMILY MEDICINE

## 2024-01-08 PROCEDURE — 1160F RVW MEDS BY RX/DR IN RCRD: CPT | Mod: ,,, | Performed by: FAMILY MEDICINE

## 2024-01-08 PROCEDURE — 90677 PCV20 VACCINE IM: CPT | Mod: ,,, | Performed by: FAMILY MEDICINE

## 2024-01-08 PROCEDURE — 99214 OFFICE O/P EST MOD 30 MIN: CPT | Mod: ,,, | Performed by: FAMILY MEDICINE

## 2024-01-08 PROCEDURE — 82043 UR ALBUMIN QUANTITATIVE: CPT | Mod: ,,, | Performed by: CLINICAL MEDICAL LABORATORY

## 2024-01-08 PROCEDURE — 3079F DIAST BP 80-89 MM HG: CPT | Mod: ,,, | Performed by: FAMILY MEDICINE

## 2024-01-08 PROCEDURE — 80053 COMPREHEN METABOLIC PANEL: CPT | Mod: ,,, | Performed by: CLINICAL MEDICAL LABORATORY

## 2024-01-08 RX ORDER — AMLODIPINE BESYLATE 5 MG/1
5 TABLET ORAL DAILY
Qty: 90 TABLET | Refills: 1 | Status: SHIPPED | OUTPATIENT
Start: 2024-01-08 | End: 2025-01-07

## 2024-01-08 RX ORDER — ONDANSETRON 4 MG/1
4 TABLET, FILM COATED ORAL EVERY 6 HOURS PRN
Qty: 90 TABLET | Refills: 0 | Status: SHIPPED | OUTPATIENT
Start: 2024-01-08

## 2024-01-08 RX ORDER — LISINOPRIL 40 MG/1
40 TABLET ORAL DAILY
Qty: 90 TABLET | Refills: 1 | Status: SHIPPED | OUTPATIENT
Start: 2024-01-08 | End: 2026-02-03

## 2024-01-08 RX ORDER — METOPROLOL SUCCINATE 100 MG/1
100 TABLET, EXTENDED RELEASE ORAL DAILY
Qty: 90 TABLET | Refills: 1 | Status: SHIPPED | OUTPATIENT
Start: 2024-01-08

## 2024-01-08 RX ORDER — FLUTICASONE PROPIONATE 50 MCG
2 SPRAY, SUSPENSION (ML) NASAL DAILY
Qty: 48 G | Refills: 1 | Status: SHIPPED | OUTPATIENT
Start: 2024-01-08

## 2024-01-08 RX ORDER — CETIRIZINE HYDROCHLORIDE 10 MG/1
10 TABLET ORAL DAILY
Qty: 90 TABLET | Refills: 1 | Status: SHIPPED | OUTPATIENT
Start: 2024-01-08 | End: 2025-01-07

## 2024-01-08 RX ORDER — ACETAMINOPHEN 500 MG
5000 TABLET ORAL DAILY
Qty: 90 TABLET | Refills: 1 | Status: SHIPPED | OUTPATIENT
Start: 2024-01-08

## 2024-01-08 RX ORDER — PRAVASTATIN SODIUM 20 MG/1
20 TABLET ORAL NIGHTLY
Qty: 90 TABLET | Refills: 1 | Status: SHIPPED | OUTPATIENT
Start: 2024-01-08

## 2024-01-08 RX ORDER — MIRTAZAPINE 7.5 MG/1
7.5 TABLET, FILM COATED ORAL NIGHTLY
Qty: 90 TABLET | Refills: 1 | Status: SHIPPED | OUTPATIENT
Start: 2024-01-08

## 2024-01-08 NOTE — PROGRESS NOTES
[facility]    Chief Complaint      Chief Complaint   Patient presents with    Follow-up    Diabetes       History of Present Illness      Fatmata Tolbert is a 78 y.o. female with chronic conditions of  has a past medical history of Acute superficial gastritis without hemorrhage, Anxiety, Cardiomyopathy, Diabetes mellitus, type 2, Diabetes, polyneuropathy, DJD (degenerative joint disease), multiple sites, Duodenitis, GERD (gastroesophageal reflux disease), HH (hiatus hernia), HTN (hypertension), Hyperlipidemia, ANNA (obstructive sleep apnea), and Pseudophakia.      The patient presents today for a three month follow up visit for Type 2 Diabetes.She has complaints of diarrhea.    Follow-up  Pertinent negatives include no abdominal pain, chest pain, chills, coughing, fever, headaches, rash, sore throat or weakness.   Diabetes  Pertinent negatives for hypoglycemia include no headaches. Pertinent negatives for diabetes include no blurred vision, no chest pain, no polydipsia and no weakness.       Past Medical History:  Past Medical History:   Diagnosis Date    Acute superficial gastritis without hemorrhage 7/14/2022    Anxiety     Cardiomyopathy     Diabetes mellitus, type 2     Diabetes, polyneuropathy     DJD (degenerative joint disease), multiple sites     Duodenitis 7/14/2022    GERD (gastroesophageal reflux disease)     HH (hiatus hernia) 7/14/2022    HTN (hypertension)     Hyperlipidemia     ANNA (obstructive sleep apnea)     Pseudophakia 02/09/2021       Past Surgical History:   has a past surgical history that includes Breast biopsy and Hysterectomy.    Social History:  Social History     Tobacco Use    Smoking status: Never    Smokeless tobacco: Never   Substance Use Topics    Alcohol use: Never    Drug use: Never       I personally reviewed all past medical, surgical, and social.     Review of Systems   Constitutional:  Negative for chills and fever.   HENT:  Negative for ear pain and sore throat.     Eyes:  Negative for blurred vision.   Respiratory:  Negative for cough and shortness of breath.    Cardiovascular:  Negative for chest pain and palpitations.   Gastrointestinal:  Positive for diarrhea. Negative for abdominal pain and constipation.        The patient is still complaining of diarrhea. She is concerned that it may be the metformin in her Janumet XR.   Genitourinary:  Negative for dysuria and hematuria.   Musculoskeletal:  Negative for back pain and falls.   Skin:  Negative for itching and rash.   Neurological:  Negative for weakness and headaches.   Endo/Heme/Allergies:  Negative for polydipsia. Does not bruise/bleed easily.   Psychiatric/Behavioral:  Negative for suicidal ideas. The patient does not have insomnia.         Medications:  Outpatient Encounter Medications as of 1/8/2024   Medication Sig Dispense Refill    ACCU-CHEK GUIDE GLUCOSE METER Misc 1 strip by Misc.(Non-Drug; Combo Route) route once daily. 1 each 0    ACCU-CHEK SOFTCLIX LANCETS Misc TEST BLOOD SUGAR ONE TIME DAILY 100 each 3    alcohol swabs (DROPSAFE ALCOHOL PREP PADS) PadM USE TO CLEANSE THE SKIN PRIOR TO DAILY GLUCOSE CHECK 100 each 3    amLODIPine (NORVASC) 5 MG tablet Take 1 tablet (5 mg total) by mouth once daily. 90 tablet 1    aspirin (ECOTRIN) 81 MG EC tablet Take 81 mg by mouth once daily.      blood sugar diagnostic (ACCU-CHEK GUIDE TEST STRIPS) Strp TEST BLOOD SUGAR EVERY DAY AS DIRECTED 100 strip 3    cetirizine (ZYRTEC) 10 MG tablet Take 1 tablet (10 mg total) by mouth once daily. 90 tablet 1    cholecalciferol, vitamin D3, 125 mcg (5,000 unit) Tab Take 1 tablet (5,000 Units total) by mouth once daily. 90 tablet 1    erythromycin (ROMYCIN) ophthalmic ointment Place into the left eye.      famotidine (PEPCID) 20 MG tablet Take 1 tablet (20 mg total) by mouth 2 (two) times daily. 180 tablet 3    fluticasone propionate (FLONASE) 50 mcg/actuation nasal spray 2 sprays (100 mcg total) by Each Nostril route once daily. 48  g 2    Lactobacillus rhamnosus GG (CULTURELLE) 10 billion cell capsule Take 1 capsule by mouth once daily.      lisinopriL (PRINIVIL,ZESTRIL) 40 MG tablet Take 1 tablet (40 mg total) by mouth once daily. 90 tablet 1    SORAYA BIOTIN ORAL Take 3,000 mcg by mouth 2 (two) times a day.      metoprolol succinate (TOPROL-XL) 100 MG 24 hr tablet Take 1 tablet (100 mg total) by mouth once daily. 90 tablet 1    mirtazapine (REMERON) 7.5 MG Tab Take 1 tablet (7.5 mg total) by mouth every evening. 90 tablet 1    omega-3 fatty acids/fish oil (FISH OIL-OMEGA-3 FATTY ACIDS) 300-1,000 mg capsule Take by mouth once daily.      ondansetron (ZOFRAN) 4 MG tablet Take 1 tablet (4 mg total) by mouth every 6 (six) hours as needed for Nausea. 90 tablet 0    pravastatin (PRAVACHOL) 20 MG tablet Take 1 tablet (20 mg total) by mouth nightly. 90 tablet 1    SITagliptan-metformin (JANUMET XR) 50-1,000 mg TM24 Take 2 tablets in the am po 180 tablet 3    gabapentin (NEURONTIN) 300 MG capsule Take 1 capsule (300 mg total) by mouth 2 (two) times daily. (Patient not taking: Reported on 1/8/2024) 180 capsule 1    methocarbamoL (ROBAXIN) 500 MG Tab Take 1 tablet (500 mg total) by mouth 2 (two) times a day. (Patient not taking: Reported on 1/8/2024) 180 tablet 1    [DISCONTINUED] sodium chloride 0.9% flush 10 mL        No facility-administered encounter medications on file as of 1/8/2024.       Allergies:  Review of patient's allergies indicates:   Allergen Reactions    Augmentin [amoxicillin-pot clavulanate] Diarrhea    Clarithromycin Diarrhea       Health Maintenance:  Immunization History   Administered Date(s) Administered    COVID-19 MRNA, LN-S PF (MODERNA HALF 0.25 ML DOSE) 06/10/2022    COVID-19, MRNA, LN-S, PF (MODERNA FULL 0.5 ML DOSE) 02/12/2021, 03/16/2021, 11/05/2021    COVID-19, mRNA, LNP-S, bivalent booster, PF (Moderna Omicron)12 + YEARS 01/13/2023    Influenza (FLUAD) - Quadrivalent - Adjuvanted - PF *Preferred* (65+) 10/24/2022     "Influenza - Quadrivalent - High Dose - PF (65 years and older) 01/26/2022    Influenza - Trivalent (ADULT) 01/28/2016    Pneumococcal Polysaccharide - 23 Valent 10/24/2022    Zoster 01/13/2023    Zoster Recombinant 03/16/2023      Health Maintenance   Topic Date Due    TETANUS VACCINE  Never done    Eye Exam  02/09/2022    Shingles Vaccine (3 of 3) 05/11/2023    Hemoglobin A1c  03/25/2024    Lipid Panel  09/25/2024    DEXA Scan  11/10/2026    Hepatitis C Screening  Completed        Physical Exam      Vital Signs  Pulse: 70  SpO2: 97 %  BP: 132/84  Height and Weight  Height: 5' 6" (167.6 cm)  Weight: 67.6 kg (149 lb)  BSA (Calculated - sq m): 1.77 sq meters  BMI (Calculated): 24.1  Weight in (lb) to have BMI = 25: 154.6]    Physical Exam  Vitals and nursing note reviewed.   Constitutional:       Appearance: Normal appearance.   HENT:      Head: Normocephalic and atraumatic.      Nose: Nose normal.   Eyes:      Extraocular Movements: Extraocular movements intact.      Conjunctiva/sclera: Conjunctivae normal.      Pupils: Pupils are equal, round, and reactive to light.   Cardiovascular:      Rate and Rhythm: Normal rate and regular rhythm.      Heart sounds: Normal heart sounds.   Pulmonary:      Effort: Pulmonary effort is normal.      Breath sounds: Normal breath sounds.   Musculoskeletal:      Right lower leg: No edema.      Left lower leg: No edema.   Skin:     Findings: No rash.   Neurological:      General: No focal deficit present.      Mental Status: She is alert and oriented to person, place, and time. Mental status is at baseline.      Cranial Nerves: No cranial nerve deficit.      Gait: Gait normal.   Psychiatric:         Mood and Affect: Mood normal.         Behavior: Behavior normal.         Thought Content: Thought content normal.         Judgment: Judgment normal.          Laboratory:  CBC:  Recent Labs   Lab 07/02/21  1138 02/21/22  0832 06/07/22  0928 09/28/22  0712   WBC 4.89 5.31  --  5.57   RBC 4.09 " L 4.00 L  --  4.02 L   Hemoglobin 11.2 L 11.5 L   < > 11.7 L   Hematocrit 35.8 L 35.4 L   < > 36.0 L   Platelet Count 218 186  --  221   MCV 87.5 88.5  --  89.6   MCH 27.4 28.8  --  29.1   MCHC 31.3 L 32.5  --  32.5    < > = values in this interval not displayed.     CMP:  Recent Labs   Lab 09/28/22  0712 04/04/23  1134 10/04/23  0903   Glucose 135 H 99 108 H   Calcium 9.5 9.9 9.2   Albumin 3.8 3.8 3.7   Total Protein 6.8 7.1 7.2   Sodium 138 142 142   Potassium 4.7 4.8 4.8   CO2 30 30 30   Chloride 104 108 H 108 H   BUN 18 19 H 16   Alk Phos 51 L 47 L 41 L   ALT 22 15 19   AST 15 9 L 12 L   Bilirubin, Total 0.4 0.2 0.4     LIPIDS:  Recent Labs   Lab 10/26/21  0920 09/22/22  0715 09/28/22  0712 09/25/23  0946   TSH  --   --  0.587  --    HDL Cholesterol 71 H 73 H  --  75 H   Cholesterol 138 157  --  158   Triglycerides 74 61  --  57   LDL Calculated 52 72  --  72   Cholesterol/HDL Ratio (Risk Factor) 1.9 2.2  --  2.1   Non-HDL 67 84  --  83     TSH:  Recent Labs   Lab 09/28/22  0712   TSH 0.587     A1C:  Recent Labs   Lab 06/16/21  1333 10/26/21  0920 01/26/22  0905 09/21/22  0852 03/22/23  0832 09/25/23  0901   Hemoglobin A1C 6.6 6.6 6.4 6.4 6.2 6.4       Assessment/Plan     Fatmata Tolbert is a 78 y.o.female with:    1. Type 2 diabetes mellitus without complication, without long-term current use of insulin  Assessment & Plan:  Discontinue Janumet XR due to diarrhea    New Rx: Januvia 100mg daily    Orders:  -     SITagliptin phosphate (JANUVIA) 100 MG Tab; Take 1 tablet (100 mg total) by mouth once daily.  Dispense: 90 tablet; Refill: 1  -     Comprehensive Metabolic Panel; Standing  -     Hemoglobin A1C; Standing  -     Microalbumin/Creatinine Ratio, Urine; Standing    2. Primary hypertension  -     amLODIPine (NORVASC) 5 MG tablet; Take 1 tablet (5 mg total) by mouth once daily.  Dispense: 90 tablet; Refill: 1  -     lisinopriL (PRINIVIL,ZESTRIL) 40 MG tablet; Take 1 tablet (40 mg total) by mouth once daily.   Dispense: 90 tablet; Refill: 1  -     metoprolol succinate (TOPROL-XL) 100 MG 24 hr tablet; Take 1 tablet (100 mg total) by mouth once daily.  Dispense: 90 tablet; Refill: 1    3. Pure hypercholesterolemia  -     pravastatin (PRAVACHOL) 20 MG tablet; Take 1 tablet (20 mg total) by mouth nightly.  Dispense: 90 tablet; Refill: 1    4. Chronic systolic heart failure  Overview:  Last Assessment & Plan:   Formatting of this note might be different from the original.  She looks euvolemic on exam today with stable symptoms.  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  Will continue guideline therapy with lisinopril and metoprolol.  As noted before, no MRA given history of hyperkalemia.  She will follow-up with Dr. Gomez as scheduled in October with echo.    Orders:  -     SITagliptin phosphate (JANUVIA) 100 MG Tab; Take 1 tablet (100 mg total) by mouth once daily.  Dispense: 90 tablet; Refill: 1    5. Non-seasonal allergic rhinitis due to pollen  -     cetirizine (ZYRTEC) 10 MG tablet; Take 1 tablet (10 mg total) by mouth once daily.  Dispense: 90 tablet; Refill: 1  -     fluticasone propionate (FLONASE) 50 mcg/actuation nasal spray; 2 sprays (100 mcg total) by Each Nostril route once daily.  Dispense: 48 g; Refill: 1    6. Vitamin D deficiency  -     cholecalciferol, vitamin D3, 125 mcg (5,000 unit) Tab; Take 1 tablet (5,000 Units total) by mouth once daily.  Dispense: 90 tablet; Refill: 1    7. Depression, unspecified depression type  -     mirtazapine (REMERON) 7.5 MG Tab; Take 1 tablet (7.5 mg total) by mouth every evening.  Dispense: 90 tablet; Refill: 1    8. Nausea  -     ondansetron (ZOFRAN) 4 MG tablet; Take 1 tablet (4 mg total) by mouth every 6 (six) hours as needed for Nausea.  Dispense: 90 tablet; Refill: 0    9. NSVT (nonsustained ventricular tachycardia)    10. Need for pneumococcal vaccine  -     Pneumococcal Conjugate Vaccine (20 Valent) (IM)(Preferred)        Chronic  conditions status updated as per HPI.  Other than changes above, cont current medications and maintain follow up with specialists.  Return to clinic in 3 month(s) for medication refills.    Allie Andersen DO  [facility]

## 2024-01-09 DIAGNOSIS — Z71.89 COMPLEX CARE COORDINATION: ICD-10-CM

## 2024-01-12 ENCOUNTER — PATIENT OUTREACH (OUTPATIENT)
Dept: ADMINISTRATIVE | Facility: HOSPITAL | Age: 79
End: 2024-01-12

## 2024-01-12 NOTE — PROGRESS NOTES
Health maintenance record review for population health care gaps      Population Health Chart Review & Patient Outreach Details      Further Action Needed If Patient Returns Outreach:            Updates Requested / Reviewed:     [x]  Care Everywhere    [x]     []  External Sources ([facility], [facility], [facility], etc.)    [] [facility]   [] [facility]   [] Other:    [x]  Care Team Updated   []  Removed  or Duplicate Orders   [x]  Immunization Reconciliation Completed / Queried    [] [place]   [x] [place]   [] [place]   [] [place]      Health Maintenance Topics Addressed and Outreach Outcomes / Actions Taken:             Breast Cancer Screening []  Mammogram Order Placed    []  Mammogram Screening Scheduled    []  External Records Requested & Care Team Updated if Applicable    []  External Records Uploaded & Care Team Updated if Applicable    []  Pt Declined Scheduling Mammogram    []  Pt Will Schedule with External Provider / Order Routed & Care Team Updated if Applicable              Cervical Cancer Screening []  Pap Smear Scheduled in Primary Care or OBGYN    []  External Records Requested & Care Team Updated if Applicable       []  External Records Uploaded, Care Team Updated, & History Updated if Applicable    []  Patient Declined Scheduling Pap Smear    []  Patient Will Schedule with External Provider & Care Team Updated if Applicable                  Colorectal Cancer Screening []  Colonoscopy Case Request / Referral / Home Test Order Placed    []  External Records Requested & Care Team Updated if Applicable    []  External Records Uploaded, Care Team Updated, & History Updated if Applicable    []  Patient Declined Completing Colon Cancer Screening    []  Patient Will Schedule with External Provider & Care Team Updated if Applicable    []  Fit Kit Mailed (add the SmartPhrase under additional notes)    []  Reminded Patient to Complete Home Test                Diabetic Eye Exam []  Eye Exam Screening Order  Placed    []  Eye Camera Scheduled or Optometry/Ophthalmology Referral Placed    [x]  External Records Requested & Care Team Updated if Applicable    []  External Records Uploaded, Care Team Updated, & History Updated if Applicable    []  Patient Declined Scheduling Eye Exam    []  Patient Will Schedule with External Provider & Care Team Updated if Applicable             Blood Pressure Control []  Primary Care Follow Up Visit Scheduled     []  Remote Blood Pressure Reading Captured    []  Patient Declined Remote Reading or Scheduling Appt - Escalated to PCP    []  Patient Will Call Back or Send Portal Message with Reading                 HbA1c & Other Labs []  Overdue Lab(s) Ordered    []  Overdue Lab(s) Scheduled    []  External Records Uploaded & Care Team Updated if Applicable    []  Primary Care Follow Up Visit Scheduled     []  Reminded Patient to Complete A1c Home Test    []  Patient Declined Scheduling Labs or Will Call Back to Schedule    []  Patient Will Schedule with External Provider / Order Routed, & Care Team Updated if Applicable           Primary Care Appointment []  Primary Care Appt Scheduled    []  Patient Declined Scheduling or Will Call Back to Schedule    []  Pt Established with External Provider, Updated Care Team, & Informed Pt to Notify Payor if Applicable           Medication Adherence /    Statin Use []  Primary Care Appointment Scheduled    []  Patient Reminded to  Prescription    []  Patient Declined, Provider Notified if Needed    []  Sent Provider Message to Review to Evaluate Pt for Statin, Add Exclusion Dx Codes, Document   Exclusion in Problem List, Change Statin Intensity Level to Moderate or High Intensity if Applicable                Osteoporosis Screening []  Dexa Order Placed    []  Dexa Appointment Scheduled    []  External Records Requested & Care Team Updated    []  External Records Uploaded, Care Team Updated, & History Updated if Applicable    []  Patient Declined  Scheduling Dexa or Will Call Back to Schedule    []  Patient Will Schedule with External Provider / Order Routed & Care Team Updated if Applicable       Additional Notes:

## 2024-01-12 NOTE — LETTER
AUTHORIZATION FOR RELEASE OF   CONFIDENTIAL INFORMATION    Dear [employer],    We are seeing Fatmata Tolbert, date of birth 1945, in the clinic at [facility]. Allie Andersen DO is the patient's PCP. Fatmata Tolbert has an outstanding lab/procedure at the time we reviewed her chart. In order to help keep her health information updated, she has authorized us to request the following medical record(s):        (  )  MAMMOGRAM                                      (  )  COLONOSCOPY      (  )  PAP SMEAR                                          (  )  OUTSIDE LAB RESULTS     (  )  DEXA SCAN                                          (  )  EYE EXAM            (  )  FOOT EXAM                                          (  )  ENTIRE RECORD     x  )  OUTSIDE IMMUNIZATIONS                 (  )  _______________         Please fax records to [facility], Mnzava, Christy, DO, [phone]     If you have any questions, please contact Kath Kan at [phone].           Patient Name: Fatmata Tolbert  : 1945  Patient Phone #: [phone]

## 2024-01-12 NOTE — LETTER
AUTHORIZATION FOR RELEASE OF   CONFIDENTIAL INFORMATION    Dear Donald Puri ,    We are seeing Fatmata Tolbert, date of birth 1945, in the clinic at [facility]. Allie Andersen DO is the patient's PCP. Fatmata Tolbert has an outstanding lab/procedure at the time we reviewed her chart. In order to help keep her health information updated, she has authorized us to request the following medical record(s):        (  )  MAMMOGRAM                                      (  )  COLONOSCOPY      (  )  PAP SMEAR                                          (  )  OUTSIDE LAB RESULTS     (  )  DEXA SCAN                                          ( x )  EYE EXAM            (  )  FOOT EXAM                                          (  )  ENTIRE RECORD     (  )  OUTSIDE IMMUNIZATIONS                 (  )  _______________         Please fax records to [facility], Mnzava, Christy, DO, [phone]     If you have any questions, please contact [facility] at [phone].           Patient Name: Fatmata Tolbert  : 1945  Patient Phone #: [phone]

## 2024-01-25 ENCOUNTER — OFFICE VISIT (OUTPATIENT)
Dept: FAMILY MEDICINE | Facility: CLINIC | Age: 79
End: 2024-01-25
Payer: MEDICARE

## 2024-01-25 VITALS
HEIGHT: 66 IN | WEIGHT: 150 LBS | DIASTOLIC BLOOD PRESSURE: 62 MMHG | SYSTOLIC BLOOD PRESSURE: 110 MMHG | OXYGEN SATURATION: 99 % | BODY MASS INDEX: 24.11 KG/M2 | HEART RATE: 70 BPM

## 2024-01-25 DIAGNOSIS — G89.29 CHRONIC LEFT-SIDED LOW BACK PAIN WITH LEFT-SIDED SCIATICA: Primary | ICD-10-CM

## 2024-01-25 DIAGNOSIS — M54.42 CHRONIC LEFT-SIDED LOW BACK PAIN WITH LEFT-SIDED SCIATICA: Primary | ICD-10-CM

## 2024-01-25 PROCEDURE — 99214 OFFICE O/P EST MOD 30 MIN: CPT | Mod: 25,,, | Performed by: FAMILY MEDICINE

## 2024-01-25 PROCEDURE — 1160F RVW MEDS BY RX/DR IN RCRD: CPT | Mod: ,,, | Performed by: FAMILY MEDICINE

## 2024-01-25 PROCEDURE — 3078F DIAST BP <80 MM HG: CPT | Mod: ,,, | Performed by: FAMILY MEDICINE

## 2024-01-25 PROCEDURE — 1159F MED LIST DOCD IN RCRD: CPT | Mod: ,,, | Performed by: FAMILY MEDICINE

## 2024-01-25 PROCEDURE — 1125F AMNT PAIN NOTED PAIN PRSNT: CPT | Mod: ,,, | Performed by: FAMILY MEDICINE

## 2024-01-25 PROCEDURE — 1101F PT FALLS ASSESS-DOCD LE1/YR: CPT | Mod: ,,, | Performed by: FAMILY MEDICINE

## 2024-01-25 PROCEDURE — 96372 THER/PROPH/DIAG INJ SC/IM: CPT | Mod: ,,, | Performed by: FAMILY MEDICINE

## 2024-01-25 PROCEDURE — 3288F FALL RISK ASSESSMENT DOCD: CPT | Mod: ,,, | Performed by: FAMILY MEDICINE

## 2024-01-25 PROCEDURE — 3074F SYST BP LT 130 MM HG: CPT | Mod: ,,, | Performed by: FAMILY MEDICINE

## 2024-01-25 RX ORDER — KETOROLAC TROMETHAMINE 30 MG/ML
60 INJECTION, SOLUTION INTRAMUSCULAR; INTRAVENOUS
Status: COMPLETED | OUTPATIENT
Start: 2024-01-25 | End: 2024-01-25

## 2024-01-25 RX ORDER — MELOXICAM 15 MG/1
15 TABLET ORAL DAILY
Qty: 90 TABLET | Refills: 1 | Status: SHIPPED | OUTPATIENT
Start: 2024-01-25

## 2024-01-25 RX ORDER — HYDROCODONE BITARTRATE AND ACETAMINOPHEN 10; 325 MG/1; MG/1
1 TABLET ORAL EVERY 6 HOURS PRN
Qty: 20 TABLET | Refills: 0 | Status: SHIPPED | OUTPATIENT
Start: 2024-01-25

## 2024-01-25 RX ADMIN — KETOROLAC TROMETHAMINE 60 MG: 30 INJECTION, SOLUTION INTRAMUSCULAR; INTRAVENOUS at 09:01

## 2024-01-25 NOTE — PROGRESS NOTES
[facility]    Chief Complaint      Chief Complaint   Patient presents with    Leg Pain     Outside of left leg all the way down to her ankle, starts in lower back on left side; x1w; no known injury, constant ache        History of Present Illness      Fatmata Tolbert is a 78 y.o. female with chronic conditions of  has a past medical history of Acute superficial gastritis without hemorrhage, Anxiety, Cardiomyopathy, Diabetes, polyneuropathy, DJD (degenerative joint disease), multiple sites, Duodenitis, GERD (gastroesophageal reflux disease), HH (hiatus hernia), HTN (hypertension), Hyperlipidemia, ANNA (obstructive sleep apnea), and Pseudophakia.      The patient presents today for leg pain.    Leg Pain   There was no injury mechanism. The pain is present in the left leg, left hip and left knee. The quality of the pain is described as aching and burning. The pain is at a severity of 8/10. The pain is moderate. The pain has been Constant since onset. She reports no foreign bodies present. The symptoms are aggravated by weight bearing. She has tried elevation, rest and acetaminophen for the symptoms. The treatment provided mild relief.       Past Medical History:  Past Medical History:   Diagnosis Date    Acute superficial gastritis without hemorrhage 07/14/2022    Anxiety     Cardiomyopathy     Diabetes, polyneuropathy     DJD (degenerative joint disease), multiple sites     Duodenitis 07/14/2022    GERD (gastroesophageal reflux disease)     HH (hiatus hernia) 07/14/2022    HTN (hypertension)     Hyperlipidemia     ANNA (obstructive sleep apnea)     Pseudophakia 02/09/2021       Past Surgical History:   has a past surgical history that includes Breast biopsy and Hysterectomy.    Social History:  Social History     Tobacco Use    Smoking status: Never    Smokeless tobacco: Never   Substance Use Topics    Alcohol use: Never    Drug use: Never       I personally reviewed all past medical, surgical, and social.      Review of Systems   Constitutional:  Negative for chills and fever.   HENT:  Negative for ear pain and sore throat.    Eyes:  Negative for blurred vision.   Respiratory:  Negative for cough and shortness of breath.    Cardiovascular:  Negative for chest pain and palpitations.   Gastrointestinal:  Negative for abdominal pain and constipation.   Genitourinary:  Negative for dysuria and hematuria.   Musculoskeletal:  Positive for back pain. Negative for falls.   Skin:  Negative for itching and rash.   Neurological:  Negative for weakness and headaches.   Endo/Heme/Allergies:  Negative for polydipsia. Does not bruise/bleed easily.   Psychiatric/Behavioral:  Negative for suicidal ideas. The patient does not have insomnia.         Medications:  Outpatient Encounter Medications as of 1/25/2024   Medication Sig Dispense Refill    ACCU-CHEK GUIDE GLUCOSE METER Misc 1 strip by Misc.(Non-Drug; Combo Route) route once daily. 1 each 0    ACCU-CHEK SOFTCLIX LANCETS Misc TEST BLOOD SUGAR ONE TIME DAILY 100 each 3    alcohol swabs (DROPSAFE ALCOHOL PREP PADS) PadM USE TO CLEANSE THE SKIN PRIOR TO DAILY GLUCOSE CHECK 100 each 3    amLODIPine (NORVASC) 5 MG tablet Take 1 tablet (5 mg total) by mouth once daily. 90 tablet 1    aspirin (ECOTRIN) 81 MG EC tablet Take 81 mg by mouth once daily.      blood sugar diagnostic (ACCU-CHEK GUIDE TEST STRIPS) Str TEST BLOOD SUGAR EVERY DAY AS DIRECTED 100 strip 3    cetirizine (ZYRTEC) 10 MG tablet Take 1 tablet (10 mg total) by mouth once daily. 90 tablet 1    cholecalciferol, vitamin D3, 125 mcg (5,000 unit) Tab Take 1 tablet (5,000 Units total) by mouth once daily. 90 tablet 1    famotidine (PEPCID) 20 MG tablet Take 1 tablet (20 mg total) by mouth 2 (two) times daily. 180 tablet 3    fluticasone propionate (FLONASE) 50 mcg/actuation nasal spray 2 sprays (100 mcg total) by Each Nostril route once daily. 48 g 1    lisinopriL (PRINIVIL,ZESTRIL) 40 MG tablet Take 1 tablet (40 mg total) by  mouth once daily. 90 tablet 1    SORAYA BIOTIN ORAL Take 3,000 mcg by mouth 2 (two) times a day.      metoprolol succinate (TOPROL-XL) 100 MG 24 hr tablet Take 1 tablet (100 mg total) by mouth once daily. 90 tablet 1    mirtazapine (REMERON) 7.5 MG Tab Take 1 tablet (7.5 mg total) by mouth every evening. 90 tablet 1    omega-3 fatty acids/fish oil (FISH OIL-OMEGA-3 FATTY ACIDS) 300-1,000 mg capsule Take by mouth once daily.      ondansetron (ZOFRAN) 4 MG tablet Take 1 tablet (4 mg total) by mouth every 6 (six) hours as needed for Nausea. 90 tablet 0    pravastatin (PRAVACHOL) 20 MG tablet Take 1 tablet (20 mg total) by mouth nightly. 90 tablet 1    SITagliptin phosphate (JANUVIA) 100 MG Tab Take 1 tablet (100 mg total) by mouth once daily. 90 tablet 1     No facility-administered encounter medications on file as of 1/25/2024.       Allergies:  Review of patient's allergies indicates:   Allergen Reactions    Augmentin [amoxicillin-pot clavulanate] Diarrhea    Clarithromycin Diarrhea       Health Maintenance:  Immunization History   Administered Date(s) Administered    COVID-19 MRNA, LN-S PF (MODERNA HALF 0.25 ML DOSE) 06/10/2022    COVID-19, MRNA, LN-S, PF (MODERNA FULL 0.5 ML DOSE) 02/12/2021, 03/16/2021, 11/05/2021    COVID-19, mRNA, LNP-S, bivalent booster, PF (Moderna Omicron)12 + YEARS 01/13/2023    Influenza (FLUAD) - Quadrivalent - Adjuvanted - PF *Preferred* (65+) 10/24/2022    Influenza - Quadrivalent - High Dose - PF (65 years and older) 01/26/2022    Influenza - Trivalent (ADULT) 01/28/2016    Pneumococcal Conjugate - 20 Valent 01/08/2024    Pneumococcal Polysaccharide - 23 Valent 10/24/2022    Zoster 01/13/2023    Zoster Recombinant 03/16/2023      Health Maintenance   Topic Date Due    TETANUS VACCINE  Never done    Eye Exam  02/09/2022    Shingles Vaccine (3 of 3) 05/11/2023    Hemoglobin A1c  07/08/2024    Lipid Panel  09/25/2024    DEXA Scan  11/10/2026    Hepatitis C Screening  Completed     "    Physical Exam      Vital Signs  BP: 110/62  Height and Weight  Height: 5' 6" (167.6 cm)  Weight: 68 kg (150 lb)  BSA (Calculated - sq m): 1.78 sq meters  BMI (Calculated): 24.2  Weight in (lb) to have BMI = 25: 154.6]    Physical Exam  Vitals and nursing note reviewed.   Constitutional:       Appearance: Normal appearance.   HENT:      Head: Normocephalic and atraumatic.   Cardiovascular:      Rate and Rhythm: Normal rate and regular rhythm.      Heart sounds: Normal heart sounds.   Pulmonary:      Effort: Pulmonary effort is normal.      Breath sounds: Normal breath sounds.   Musculoskeletal:      Lumbar back: Decreased range of motion. Positive left straight leg raise test.   Skin:     Findings: No rash.   Neurological:      General: No focal deficit present.      Mental Status: She is alert and oriented to person, place, and time.      Gait: Gait normal.   Psychiatric:         Mood and Affect: Mood normal.         Behavior: Behavior normal.         Thought Content: Thought content normal.         Judgment: Judgment normal.          Laboratory:  CBC:  Recent Labs   Lab 07/02/21  1138 02/21/22  0832 06/07/22  0928 09/28/22  0712   WBC 4.89 5.31  --  5.57   RBC 4.09 L 4.00 L  --  4.02 L   Hemoglobin 11.2 L 11.5 L   < > 11.7 L   Hematocrit 35.8 L 35.4 L   < > 36.0 L   Platelet Count 218 186  --  221   MCV 87.5 88.5  --  89.6   MCH 27.4 28.8  --  29.1   MCHC 31.3 L 32.5  --  32.5    < > = values in this interval not displayed.     CMP:  Recent Labs   Lab 04/04/23  1134 10/04/23  0903 01/08/24  0912   Glucose 99 108 H 87   Calcium 9.9 9.2 10.0   Albumin 3.8 3.7 4.0   Total Protein 7.1 7.2 7.5   Sodium 142 142 143   Potassium 4.8 4.8 4.2   CO2 30 30 29   Chloride 108 H 108 H 107   BUN 19 H 16 13   Alk Phos 47 L 41 L 39 L   ALT 15 19 16   AST 9 L 12 L 14 L   Bilirubin, Total 0.2 0.4 0.4     LIPIDS:  Recent Labs   Lab 10/26/21  0920 09/22/22  0715 09/28/22  0712 09/25/23  0946   TSH  --   --  0.587  --    HDL " Cholesterol 71 H 73 H  --  75 H   Cholesterol 138 157  --  158   Triglycerides 74 61  --  57   LDL Calculated 52 72  --  72   Cholesterol/HDL Ratio (Risk Factor) 1.9 2.2  --  2.1   Non-HDL 67 84  --  83     TSH:  Recent Labs   Lab 09/28/22  0712   TSH 0.587     A1C:  Recent Labs   Lab 06/16/21  1333 10/26/21  0920 01/26/22  0905 09/21/22  0852 03/22/23  0832 09/25/23  0901 01/08/24  0912   Hemoglobin A1C 6.6 6.6 6.4 6.4 6.2 6.4 6.2       Assessment/Plan     Fatmata Tolbert is a 78 y.o.female with:    1. Chronic left-sided low back pain with left-sided sciatica  -     X-Ray Lumbar Spine Ap And Lateral; Future; Expected date: 01/25/2024  -     ketorolac injection 60 mg  -     HYDROcodone-acetaminophen (NORCO)  mg per tablet; Take 1 tablet by mouth every 6 (six) hours as needed for Pain.  Dispense: 20 tablet; Refill: 0  -     meloxicam (MOBIC) 15 MG tablet; Take 1 tablet (15 mg total) by mouth once daily.  Dispense: 90 tablet; Refill: 1        Chronic conditions status updated as per HPI.  Other than changes above, cont current medications and maintain follow up with specialists.  Return to clinic prn if symptoms worsen or fail to improve.    Allie Andersen DO  [facility]

## 2024-02-02 ENCOUNTER — OFFICE VISIT (OUTPATIENT)
Dept: GASTROENTEROLOGY | Facility: CLINIC | Age: 79
End: 2024-02-02
Payer: MEDICARE

## 2024-02-02 VITALS
HEIGHT: 66 IN | WEIGHT: 153.81 LBS | BODY MASS INDEX: 24.72 KG/M2 | SYSTOLIC BLOOD PRESSURE: 129 MMHG | DIASTOLIC BLOOD PRESSURE: 62 MMHG | HEART RATE: 66 BPM

## 2024-02-02 DIAGNOSIS — K31.84 GASTROPARESIS: ICD-10-CM

## 2024-02-02 DIAGNOSIS — K59.00 CONSTIPATION, UNSPECIFIED CONSTIPATION TYPE: Primary | ICD-10-CM

## 2024-02-02 PROCEDURE — 3078F DIAST BP <80 MM HG: CPT | Mod: CPTII,,, | Performed by: NURSE PRACTITIONER

## 2024-02-02 PROCEDURE — 3074F SYST BP LT 130 MM HG: CPT | Mod: CPTII,,, | Performed by: NURSE PRACTITIONER

## 2024-02-02 PROCEDURE — 99214 OFFICE O/P EST MOD 30 MIN: CPT | Mod: PBBFAC | Performed by: NURSE PRACTITIONER

## 2024-02-02 PROCEDURE — 3288F FALL RISK ASSESSMENT DOCD: CPT | Mod: CPTII,,, | Performed by: NURSE PRACTITIONER

## 2024-02-02 PROCEDURE — 1159F MED LIST DOCD IN RCRD: CPT | Mod: CPTII,,, | Performed by: NURSE PRACTITIONER

## 2024-02-02 PROCEDURE — 99214 OFFICE O/P EST MOD 30 MIN: CPT | Mod: S$PBB,,, | Performed by: NURSE PRACTITIONER

## 2024-02-02 PROCEDURE — 1101F PT FALLS ASSESS-DOCD LE1/YR: CPT | Mod: CPTII,,, | Performed by: NURSE PRACTITIONER

## 2024-02-02 NOTE — PROGRESS NOTES
Fatmata Tolbert is a 78 y.o. female here for Follow-up        PCP: Allie Andersen  Referring Provider: Tessa Hastings, Rich  [address] [facility] [place] [id number]     HPI:  Presents for follow-up after colonoscopy.  Colonoscopy on 01/03/2024, negative for lymphocytic or collagenous colitis.  No colon polyps.  Pandiverticulosis.  She does have a history of C diff infection.  Calprotectin was negative.  Stools for enteric pathogens negative.  No hematochezia or melena.  Patient discontinue Janumet and is doing much better.  Diarrhea has resolved.  Now reports occasional constipation.  She is currently taking a probiotic, Culturelle.  Advised if needed she may add a stool softener due to constipation.  States that she does drink adequate water.  She has gastroparesis.  She does follow a low residue small frequent meal diet.  She was given one trial of Flagyl for possible SIBO diarrhea.    Follow-up  Pertinent negatives include no abdominal pain, change in bowel habit, fatigue, fever, nausea or vomiting.         ROS:  Review of Systems   Constitutional:  Negative for appetite change, fatigue, fever and unexpected weight change.   HENT:  Negative for trouble swallowing.    Gastrointestinal:  Positive for constipation. Negative for abdominal pain, blood in stool, change in bowel habit, diarrhea, nausea, vomiting and reflux.   Musculoskeletal:  Negative for gait problem.   Integumentary:  Negative for pallor.   Psychiatric/Behavioral:  The patient is not nervous/anxious.           PMHX:  has a past medical history of Acute superficial gastritis without hemorrhage (07/14/2022), Anxiety, Cardiomyopathy, Diabetes, polyneuropathy, DJD (degenerative joint disease), multiple sites, Duodenitis (07/14/2022), GERD (gastroesophageal reflux disease), HH (hiatus hernia) (07/14/2022), HTN (hypertension), Hyperlipidemia, ANNA (obstructive sleep apnea), and Pseudophakia (02/09/2021).    PSHX:  has a past  surgical history that includes Breast biopsy and Hysterectomy.    PFHX: family history includes Breast cancer in her maternal grandmother and sister; Diabetes in her mother; Heart disease in her father and sister; Hypertension in her father and sister; Kidney disease in her brother; Prostate cancer in her father; Stroke in her brother, brother, mother, and sister.    PSlHX:  reports that she has never smoked. She has never used smokeless tobacco. She reports that she does not drink alcohol and does not use drugs.        Review of patient's allergies indicates:   Allergen Reactions    Augmentin [amoxicillin-pot clavulanate] Diarrhea    Clarithromycin Diarrhea       Medication List with Changes/Refills   Current Medications    ACCU-CHEK GUIDE GLUCOSE METER MISC    1 strip by Misc.(Non-Drug; Combo Route) route once daily.    ACCU-CHEK SOFTCLIX LANCETS MISC    TEST BLOOD SUGAR ONE TIME DAILY    ALCOHOL SWABS (DROPSAFE ALCOHOL PREP PADS) PADM    USE TO CLEANSE THE SKIN PRIOR TO DAILY GLUCOSE CHECK    AMLODIPINE (NORVASC) 5 MG TABLET    Take 1 tablet (5 mg total) by mouth once daily.    ASPIRIN (ECOTRIN) 81 MG EC TABLET    Take 81 mg by mouth once daily.    BLOOD SUGAR DIAGNOSTIC (ACCU-CHEK GUIDE TEST STRIPS) STRP    TEST BLOOD SUGAR EVERY DAY AS DIRECTED    CETIRIZINE (ZYRTEC) 10 MG TABLET    Take 1 tablet (10 mg total) by mouth once daily.    CHOLECALCIFEROL, VITAMIN D3, 125 MCG (5,000 UNIT) TAB    Take 1 tablet (5,000 Units total) by mouth once daily.    FAMOTIDINE (PEPCID) 20 MG TABLET    Take 1 tablet (20 mg total) by mouth 2 (two) times daily.    FLUTICASONE PROPIONATE (FLONASE) 50 MCG/ACTUATION NASAL SPRAY    2 sprays (100 mcg total) by Each Nostril route once daily.    HYDROCODONE-ACETAMINOPHEN (NORCO)  MG PER TABLET    Take 1 tablet by mouth every 6 (six) hours as needed for Pain.    LISINOPRIL (PRINIVIL,ZESTRIL) 40 MG TABLET    Take 1 tablet (40 mg total) by mouth once daily.    SORAYA BIOTIN ORAL    Take  "3,000 mcg by mouth 2 (two) times a day.    MELOXICAM (MOBIC) 15 MG TABLET    Take 1 tablet (15 mg total) by mouth once daily.    METOPROLOL SUCCINATE (TOPROL-XL) 100 MG 24 HR TABLET    Take 1 tablet (100 mg total) by mouth once daily.    MIRTAZAPINE (REMERON) 7.5 MG TAB    Take 1 tablet (7.5 mg total) by mouth every evening.    OMEGA-3 FATTY ACIDS/FISH OIL (FISH OIL-OMEGA-3 FATTY ACIDS) 300-1,000 MG CAPSULE    Take by mouth once daily.    ONDANSETRON (ZOFRAN) 4 MG TABLET    Take 1 tablet (4 mg total) by mouth every 6 (six) hours as needed for Nausea.    PRAVASTATIN (PRAVACHOL) 20 MG TABLET    Take 1 tablet (20 mg total) by mouth nightly.    SITAGLIPTIN PHOSPHATE (JANUVIA) 100 MG TAB    Take 1 tablet (100 mg total) by mouth once daily.        Objective Findings:  Vital Signs:  /62   Pulse 66   Ht 5' 6" (1.676 m)   Wt 69.8 kg (153 lb 12.8 oz)   BMI 24.82 kg/m²  Body mass index is 24.82 kg/m².    Physical Exam:  Physical Exam  Vitals and nursing note reviewed.   Constitutional:       General: She is not in acute distress.     Appearance: Normal appearance.   HENT:      Mouth/Throat:      Mouth: Mucous membranes are moist.   Cardiovascular:      Rate and Rhythm: Normal rate.   Pulmonary:      Effort: Pulmonary effort is normal.      Breath sounds: No wheezing, rhonchi or rales.   Abdominal:      General: Bowel sounds are normal. There is no distension.      Palpations: Abdomen is soft. There is no mass.      Tenderness: There is no abdominal tenderness. There is no guarding.   Skin:     General: Skin is warm and dry.      Coloration: Skin is not jaundiced or pale.   Neurological:      Mental Status: She is alert and oriented to person, place, and time.   Psychiatric:         Mood and Affect: Mood normal.          Labs:  Lab Results   Component Value Date    WBC 5.57 09/28/2022    HGB 11.7 (L) 09/28/2022    HCT 36.0 (L) 09/28/2022    MCV 89.6 09/28/2022    RDW 13.1 09/28/2022     09/28/2022    LYMPH 40.9 " 09/28/2022    LYMPH 2.28 09/28/2022    MONO 8.3 (H) 09/28/2022    EOS 0.19 09/28/2022    BASO 0.02 09/28/2022     Lab Results   Component Value Date     01/08/2024    K 4.2 01/08/2024     01/08/2024    CO2 29 01/08/2024    GLU 87 01/08/2024    BUN 13 01/08/2024    CREATININE 0.80 01/08/2024    CALCIUM 10.0 01/08/2024    PROT 7.5 01/08/2024    ALBUMIN 4.0 01/08/2024    BILITOT 0.4 01/08/2024    ALKPHOS 39 (L) 01/08/2024    AST 14 (L) 01/08/2024    ALT 16 01/08/2024         Imaging: X-Ray Lumbar Spine Ap And Lateral    Result Date: 1/25/2024  EXAMINATION: XR LUMBAR SPINE AP AND LATERAL CLINICAL HISTORY: Lumbago with sciatica, left side TECHNIQUE: Lumbar spine, AP, lateral and cone-down views COMPARISON: 03/04/2014 lumbar spine examination and abdomen studies dated 04/04/2023 and 11/02/2023 FINDINGS: Degenerative facet changes are present from L1 through S1.  Degenerative disc changes are present at L3-4, L4-5 and L5-S1.  No subluxation or fracture is seen. There is a 12 mm calcification which overlies the expected area of the right kidney, previously documented.  1 mm calcifications are present in the right lower quadrant.  There is abundant stool.     1.  Degenerative disc and degenerative facet changes are present at multiple levels which have progressed since the 2014 examination. 2.  Right nephrolithiasis. Place of service: [facility] Electronically signed by: Brittney Francois Date:    01/25/2024 Time:    11:53    Colonoscopy    Result Date: 1/3/2024  Table formatting from the original result was not included. Procedure Date 1/3/24 Impression Overall      Performed forceps biopsies in the ascending colon and transverse colon Diverticulosis in the ascending colon, descending colon and sigmoid colon Scattered pan-diverticulosis was noted. No colon polyps were seen. Biopsies were obtained from the ascending and transverse colon due to diarrhea. Recommendation  Repeat colonoscopy in 5 years  Discharge: disp: DC to home. High fiber diet. No driving x 24 hours. F/U with PCP as scheduled. Dx: History of colon polyps, diarrhea, colon diverticulosis Indication Diarrhea, unspecified type; history of colon polyps Providers Lilly Pagan, RN Registered Nurse Everardo Shea, Zara Easley CRNA Technician Jimenez Olvera MD Proceduralist Medications Moderate sedation administered by anesthesia staff - See anesthesia record. Preprocedure A history and physical has been performed, and patient medication allergies have been reviewed. The patient's tolerance of previous anesthesia has been reviewed. The risks and benefits of the procedure and the sedation options and risks were discussed with the patient. All questions were answered and informed consent obtained. ASA Score: ASA 2 - Patient with mild systemic disease with no functional limitations Mallampati Airway Score: II (hard and soft palate, upper portion of tonsils anduvula visible) Details of the Procedure The patient underwent monitored anesthesia care, which was administered by an anesthesia professional. The patient's heart rate, blood pressure, level of consciousness, respirations, oxygen, ECG and ETCO2 were monitored throughout the procedure. A digital rectal exam was performed. A perianal exam was performed. The scope was introduced through the anus and advanced to the cecum. Retroflexion was performed in the rectum. The quality of bowel preparation was evaluated using the Beaver Bowel Preparation Scale with scores of: right colon = 2, transverse colon = 2, left colon = 2. The total BBPS score was 6. Bowel prep was adequate. The patient's estimated blood loss was minimal (<5 mL). The procedure was not difficult. The patient tolerated the procedure well. There were no apparent adverse events. Scope: Colonoscope Scope Serial: [id number] Events Procedure Events Event Event Time Procedure Events Event Event Time ENDO SCOPE IN TIME 1/3/2024   9:47 AM ENDO CECUM REACHED 1/3/2024  9:51 AM ENDO SCOPE OUT TIME 1/3/2024  9:57 AM CECAL WITHDRAWAL TIME: 6m 06s Findings Performed multiple forceps biopsies in the ascending colon and transverse colon Diverticula in the ascending colon, descending colon and sigmoid colon; no bleeding was identified         Assessment:  Fatmata Tolbert is a 78 y.o. female here with:  1. Constipation, unspecified constipation type    2. Gastroparesis          Recommendations:  1. May take a stool softener, Colace if needed for constipation  2. Do not lay down within 3 hours of eating.  Avoid spicy, greasy foods  Eat 6-8 small meals per day.  Exercise 150 minutes per week  Avoid raw fruits and vegetables  Small amount of protein and fiber at one time  3. Patient will call for follow up appointment    No follow-ups on file.      Order summary:       Thank you for allowing me to participate in the care of Fatmata Tolbert.      NAVNEET Solares

## 2024-02-20 ENCOUNTER — TELEPHONE (OUTPATIENT)
Dept: FAMILY MEDICINE | Facility: CLINIC | Age: 79
End: 2024-02-20
Payer: MEDICARE

## 2024-02-20 NOTE — TELEPHONE ENCOUNTER
Pt stated her glucose has been running to high with her medication and would like to come in tomorrow. Pt schedule for tomorrow at 0800

## 2024-02-20 NOTE — TELEPHONE ENCOUNTER
----- Message from Guera Veras sent at 2/20/2024 10:47 AM CST -----  Regarding: pt  Pt called,  needs to talk to nurse,  said her blood sugar has been running too high. [phone]

## 2024-02-21 ENCOUNTER — OFFICE VISIT (OUTPATIENT)
Dept: FAMILY MEDICINE | Facility: CLINIC | Age: 79
End: 2024-02-21
Payer: MEDICARE

## 2024-02-21 VITALS
HEIGHT: 66 IN | HEART RATE: 65 BPM | DIASTOLIC BLOOD PRESSURE: 62 MMHG | WEIGHT: 156 LBS | SYSTOLIC BLOOD PRESSURE: 110 MMHG | BODY MASS INDEX: 25.07 KG/M2 | OXYGEN SATURATION: 98 %

## 2024-02-21 DIAGNOSIS — E13.9 DIABETES 1.5, MANAGED AS TYPE 2: Primary | ICD-10-CM

## 2024-02-21 DIAGNOSIS — R07.9 CHEST PAIN, UNSPECIFIED TYPE: ICD-10-CM

## 2024-02-21 DIAGNOSIS — R73.9 HYPERGLYCEMIA: ICD-10-CM

## 2024-02-21 DIAGNOSIS — R94.31 ABNORMAL EKG: ICD-10-CM

## 2024-02-21 LAB
EKG 12-LEAD: 62
GLUCOSE SERPL-MCNC: 180 MG/DL (ref 70–110)
PR INTERVAL: 207
PRT AXES: NORMAL
QRS DURATION: 97
QT/QTC: NORMAL
VENTRICULAR RATE: 121

## 2024-02-21 PROCEDURE — 1159F MED LIST DOCD IN RCRD: CPT | Mod: ,,, | Performed by: FAMILY MEDICINE

## 2024-02-21 PROCEDURE — 3288F FALL RISK ASSESSMENT DOCD: CPT | Mod: ,,, | Performed by: FAMILY MEDICINE

## 2024-02-21 PROCEDURE — 3074F SYST BP LT 130 MM HG: CPT | Mod: ,,, | Performed by: FAMILY MEDICINE

## 2024-02-21 PROCEDURE — 1160F RVW MEDS BY RX/DR IN RCRD: CPT | Mod: ,,, | Performed by: FAMILY MEDICINE

## 2024-02-21 PROCEDURE — 99215 OFFICE O/P EST HI 40 MIN: CPT | Mod: ,,, | Performed by: FAMILY MEDICINE

## 2024-02-21 PROCEDURE — 93005 ELECTROCARDIOGRAM TRACING: CPT | Mod: ,,, | Performed by: FAMILY MEDICINE

## 2024-02-21 PROCEDURE — 1101F PT FALLS ASSESS-DOCD LE1/YR: CPT | Mod: ,,, | Performed by: FAMILY MEDICINE

## 2024-02-21 PROCEDURE — 3078F DIAST BP <80 MM HG: CPT | Mod: ,,, | Performed by: FAMILY MEDICINE

## 2024-02-21 RX ORDER — DAPAGLIFLOZIN 10 MG/1
10 TABLET, FILM COATED ORAL DAILY
Qty: 30 TABLET | Refills: 3 | Status: SHIPPED | OUTPATIENT
Start: 2024-02-21 | End: 2024-05-23 | Stop reason: SDUPTHER

## 2024-02-21 NOTE — PROGRESS NOTES
[facility]    Chief Complaint      Chief Complaint   Patient presents with    Diabetes    Chest Pain       History of Present Illness      Fatmata Tolbert is a 78 y.o. female with chronic conditions of  has a past medical history of Acute superficial gastritis without hemorrhage, Anxiety, Cardiomyopathy, Diabetes, polyneuropathy, DJD (degenerative joint disease), multiple sites, Duodenitis, GERD (gastroesophageal reflux disease), HH (hiatus hernia), HTN (hypertension), Hyperlipidemia, ANNA (obstructive sleep apnea), and Pseudophakia.     HPI    The patient presents today for chest pain.  Diabetes  Pertinent negatives for hypoglycemia include no headaches. Associated symptoms include chest pain. Pertinent negatives for diabetes include no blurred vision, no polydipsia and no weakness.   Chest Pain   This is a new problem. The current episode started in the past 7 days. The onset quality is sudden. The problem occurs intermittently. The pain is present in the lateral region. The pain is at a severity of 0/10. The patient is experiencing no pain. The pain does not radiate. Pertinent negatives include no abdominal pain, back pain, cough, fever, headaches, palpitations, shortness of breath or weakness. The pain is aggravated by nothing. She has tried nothing for the symptoms. There are no known risk factors.       Past Medical History:  Past Medical History:   Diagnosis Date    Acute superficial gastritis without hemorrhage 07/14/2022    Anxiety     Cardiomyopathy     Diabetes, polyneuropathy     DJD (degenerative joint disease), multiple sites     Duodenitis 07/14/2022    GERD (gastroesophageal reflux disease)     HH (hiatus hernia) 07/14/2022    HTN (hypertension)     Hyperlipidemia     ANNA (obstructive sleep apnea)     Pseudophakia 02/09/2021       Past Surgical History:   has a past surgical history that includes Breast biopsy and Hysterectomy.    Social History:  Social History     Tobacco Use    Smoking  status: Never    Smokeless tobacco: Never   Substance Use Topics    Alcohol use: Never    Drug use: Never       I personally reviewed all past medical, surgical, and social.     Review of Systems   Constitutional:  Negative for chills and fever.   HENT:  Negative for ear pain and sore throat.    Eyes:  Negative for blurred vision.   Respiratory:  Negative for cough and shortness of breath.    Cardiovascular:  Positive for chest pain. Negative for palpitations.   Gastrointestinal:  Negative for abdominal pain and constipation.   Genitourinary:  Negative for dysuria and hematuria.   Musculoskeletal:  Negative for back pain and falls.   Skin:  Negative for itching and rash.   Neurological:  Negative for weakness and headaches.   Endo/Heme/Allergies:  Negative for polydipsia. Does not bruise/bleed easily.   Psychiatric/Behavioral:  Negative for suicidal ideas. The patient does not have insomnia.         Medications:  Outpatient Encounter Medications as of 2/21/2024   Medication Sig Dispense Refill    ACCU-CHEK GUIDE GLUCOSE METER Misc 1 strip by Misc.(Non-Drug; Combo Route) route once daily. 1 each 0    ACCU-CHEK SOFTCLIX LANCETS Misc TEST BLOOD SUGAR ONE TIME DAILY 100 each 3    alcohol swabs (DROPSAFE ALCOHOL PREP PADS) PadM USE TO CLEANSE THE SKIN PRIOR TO DAILY GLUCOSE CHECK 100 each 3    amLODIPine (NORVASC) 5 MG tablet Take 1 tablet (5 mg total) by mouth once daily. 90 tablet 1    aspirin (ECOTRIN) 81 MG EC tablet Take 81 mg by mouth once daily.      blood sugar diagnostic (ACCU-CHEK GUIDE TEST STRIPS) Strp TEST BLOOD SUGAR EVERY DAY AS DIRECTED 100 strip 3    cetirizine (ZYRTEC) 10 MG tablet Take 1 tablet (10 mg total) by mouth once daily. 90 tablet 1    cholecalciferol, vitamin D3, 125 mcg (5,000 unit) Tab Take 1 tablet (5,000 Units total) by mouth once daily. 90 tablet 1    famotidine (PEPCID) 20 MG tablet Take 1 tablet (20 mg total) by mouth 2 (two) times daily. 180 tablet 3    fluticasone propionate (FLONASE)  50 mcg/actuation nasal spray 2 sprays (100 mcg total) by Each Nostril route once daily. 48 g 1    HYDROcodone-acetaminophen (NORCO)  mg per tablet Take 1 tablet by mouth every 6 (six) hours as needed for Pain. 20 tablet 0    lisinopriL (PRINIVIL,ZESTRIL) 40 MG tablet Take 1 tablet (40 mg total) by mouth once daily. 90 tablet 1    SORAYA BIOTIN ORAL Take 3,000 mcg by mouth 2 (two) times a day.      meloxicam (MOBIC) 15 MG tablet Take 1 tablet (15 mg total) by mouth once daily. 90 tablet 1    metoprolol succinate (TOPROL-XL) 100 MG 24 hr tablet Take 1 tablet (100 mg total) by mouth once daily. 90 tablet 1    mirtazapine (REMERON) 7.5 MG Tab Take 1 tablet (7.5 mg total) by mouth every evening. 90 tablet 1    omega-3 fatty acids/fish oil (FISH OIL-OMEGA-3 FATTY ACIDS) 300-1,000 mg capsule Take by mouth once daily.      ondansetron (ZOFRAN) 4 MG tablet Take 1 tablet (4 mg total) by mouth every 6 (six) hours as needed for Nausea. 90 tablet 0    pravastatin (PRAVACHOL) 20 MG tablet Take 1 tablet (20 mg total) by mouth nightly. 90 tablet 1    SITagliptin phosphate (JANUVIA) 100 MG Tab Take 1 tablet (100 mg total) by mouth once daily. 90 tablet 1     No facility-administered encounter medications on file as of 2/21/2024.       Allergies:  Review of patient's allergies indicates:   Allergen Reactions    Augmentin [amoxicillin-pot clavulanate] Diarrhea    Clarithromycin Diarrhea       Health Maintenance:  Immunization History   Administered Date(s) Administered    COVID-19 MRNA, LN-S PF (MODERNA HALF 0.25 ML DOSE) 06/10/2022    COVID-19, MRNA, LN-S, PF (MODERNA FULL 0.5 ML DOSE) 02/12/2021, 03/16/2021, 11/05/2021    COVID-19, mRNA, LNP-S, bivalent booster, PF (Moderna Omicron)12 + YEARS 01/13/2023    Influenza (FLUAD) - Quadrivalent - Adjuvanted - PF *Preferred* (65+) 10/24/2022    Influenza - Quadrivalent - High Dose - PF (65 years and older) 01/26/2022    Influenza - Trivalent (ADULT) 01/28/2016    Pneumococcal Conjugate  "- 20 Valent 01/08/2024    Pneumococcal Polysaccharide - 23 Valent 10/24/2022    Zoster 01/13/2023    Zoster Recombinant 03/16/2023      Health Maintenance   Topic Date Due    TETANUS VACCINE  Never done    Eye Exam  02/09/2022    Shingles Vaccine (3 of 3) 05/11/2023    Hemoglobin A1c  07/08/2024    Lipid Panel  09/25/2024    DEXA Scan  11/10/2026    Hepatitis C Screening  Completed        Physical Exam      Vital Signs  Pulse: 65  SpO2: 98 %  BP: 110/62  BP Location: Right arm  Patient Position: Sitting  Height and Weight  Height: 5' 6" (167.6 cm)  Weight: 70.8 kg (156 lb)  BSA (Calculated - sq m): 1.82 sq meters  BMI (Calculated): 25.2  Weight in (lb) to have BMI = 25: 154.6]    Physical Exam  Vitals and nursing note reviewed.   Constitutional:       Appearance: Normal appearance.   HENT:      Head: Normocephalic and atraumatic.   Cardiovascular:      Rate and Rhythm: Normal rate and regular rhythm.      Heart sounds: Normal heart sounds.   Pulmonary:      Effort: Pulmonary effort is normal.      Breath sounds: Normal breath sounds.   Skin:     Findings: No rash.   Neurological:      General: No focal deficit present.      Mental Status: She is alert and oriented to person, place, and time.      Gait: Gait normal.   Psychiatric:         Mood and Affect: Mood normal.         Behavior: Behavior normal.         Thought Content: Thought content normal.         Judgment: Judgment normal.          Laboratory:  CBC:  Recent Labs   Lab 07/02/21  1138 02/21/22  0832 06/07/22  0928 09/28/22  0712   WBC 4.89 5.31  --  5.57   RBC 4.09 L 4.00 L  --  4.02 L   Hemoglobin 11.2 L 11.5 L   < > 11.7 L   Hematocrit 35.8 L 35.4 L   < > 36.0 L   Platelet Count 218 186  --  221   MCV 87.5 88.5  --  89.6   MCH 27.4 28.8  --  29.1   MCHC 31.3 L 32.5  --  32.5    < > = values in this interval not displayed.     CMP:  Recent Labs   Lab 04/04/23  1134 10/04/23  0903 01/08/24  0912   Glucose 99 108 H 87   Calcium 9.9 9.2 10.0   Albumin 3.8 3.7 " 4.0   Total Protein 7.1 7.2 7.5   Sodium 142 142 143   Potassium 4.8 4.8 4.2   CO2 30 30 29   Chloride 108 H 108 H 107   BUN 19 H 16 13   Alk Phos 47 L 41 L 39 L   ALT 15 19 16   AST 9 L 12 L 14 L   Bilirubin, Total 0.2 0.4 0.4     LIPIDS:  Recent Labs   Lab 10/26/21  0920 09/22/22  0715 09/28/22  0712 09/25/23  0946   TSH  --   --  0.587  --    HDL Cholesterol 71 H 73 H  --  75 H   Cholesterol 138 157  --  158   Triglycerides 74 61  --  57   LDL Calculated 52 72  --  72   Cholesterol/HDL Ratio (Risk Factor) 1.9 2.2  --  2.1   Non-HDL 67 84  --  83     TSH:  Recent Labs   Lab 09/28/22  0712   TSH 0.587     A1C:  Recent Labs   Lab 06/16/21  1333 10/26/21  0920 01/26/22  0905 09/21/22  0852 03/22/23  0832 09/25/23  0901 01/08/24  0912   Hemoglobin A1C 6.6 6.6 6.4 6.4 6.2 6.4 6.2       Assessment/Plan     Fatmata Tolbert is a 78 y.o.female with:    1. Diabetes 1.5, managed as type 2  -     POCT glucose  -     dapagliflozin propanediol (FARXIGA) 10 mg tablet; Take 1 tablet (10 mg total) by mouth once daily.  Dispense: 30 tablet; Refill: 3    2. Chest pain, unspecified type  -     POCT EKG 12-LEAD (NOT FOR [facility] USE)    3. Hyperglycemia  -     dapagliflozin propanediol (FARXIGA) 10 mg tablet; Take 1 tablet (10 mg total) by mouth once daily.  Dispense: 30 tablet; Refill: 3    4. Abnormal EKG  -     Ambulatory referral/consult to Cardiology; Future; Expected date: 02/28/2024    I instructed the patient to go to the emergency room.  The patient refused.  She signed an AMA form.    Chronic conditions status updated as per HPI.  Other than changes above, cont current medications and maintain follow up with specialists.  Return to clinic prn if symptoms worsen or fail to improve.    Allie Andersen DO  [facility]

## 2024-02-22 ENCOUNTER — PATIENT OUTREACH (OUTPATIENT)
Dept: ADMINISTRATIVE | Facility: HOSPITAL | Age: 79
End: 2024-02-22

## 2024-03-16 DIAGNOSIS — K31.84 GASTROPARESIS: ICD-10-CM

## 2024-03-18 RX ORDER — FAMOTIDINE 20 MG/1
20 TABLET, FILM COATED ORAL 2 TIMES DAILY
Qty: 180 TABLET | Refills: 3 | Status: SHIPPED | OUTPATIENT
Start: 2024-03-18

## 2024-04-01 DIAGNOSIS — R49.0 HOARSENESS: ICD-10-CM

## 2024-04-01 DIAGNOSIS — R49.0 MUSCLE TENSION DYSPHONIA: Primary | ICD-10-CM

## 2024-04-18 ENCOUNTER — CLINICAL SUPPORT (OUTPATIENT)
Dept: REHABILITATION | Facility: HOSPITAL | Age: 79
End: 2024-04-18
Payer: MEDICARE

## 2024-04-18 DIAGNOSIS — R49.0 MUSCLE TENSION DYSPHONIA: Primary | ICD-10-CM

## 2024-04-18 PROCEDURE — 92524 BEHAVRAL QUALIT ANALYS VOICE: CPT

## 2024-04-19 PROBLEM — R49.0 MUSCLE TENSION DYSPHONIA: Status: ACTIVE | Noted: 2024-04-19

## 2024-04-19 NOTE — PLAN OF CARE
"[facility]  Speech Therapy Evaluation -Voice     Name: Fatmata Tolbert   MRN: [id number]    Therapy Diagnosis:   Encounter Diagnosis   Name Primary?    Muscle tension dysphonia Yes      Physician: Darrick Hill MD  Physician Orders: Ambulatory Referral to Speech Therapy Evaluate and treat  Medical Diagnosis: Muscle tension dysphonia    Visit # / Visits Authorized:  1 / 1   Date of Evaluation:  4/18/2024   Insurance Authorization Period: 04/01/2024 to 04/01/2025  Plan of Care Certification:    4/18/2024 to 05/18/2024     Time In:0925   Time Out: 1000   Total time: 35    Procedure   Speech Language Evaluation      Precautions: Standard    Subjective      Date of Onset: Mrs. Tolbert reports that this has been occurring for approximately one year, but it has progressively worsened.  History of Current Condition:  Fatmata Tolbert is a 78 y.o. female who presents to [facility] for evaluation secondary to muscle tension dysphonia. Patient was referred to therapy by Darrick Hill MD , which is the patient's otolaryngologist. Patient reports that she was having "attacks" daily in which she wouldn't be able to get sound out. She states that she began taking an anxiety medication (klonopin) approximately 3 weeks ago and that has not had an episode since. She reports that she has a lot of stress in her life, and she feels this could be contributing to her voice.  Patient attended evaluation alone.   Past Medical History: Fatmata Tolbert  has a past medical history of Acute superficial gastritis without hemorrhage (07/14/2022), Anxiety, Cardiomyopathy, Diabetes, polyneuropathy, DJD (degenerative joint disease), multiple sites, Duodenitis (07/14/2022), GERD (gastroesophageal reflux disease), HH (hiatus hernia) (07/14/2022), HTN (hypertension), Hyperlipidemia, ANNA (obstructive sleep apnea), and Pseudophakia (02/09/2021).  Fatmata Tolbert  has a past surgical " "history that includes Breast biopsy and Hysterectomy.  Medical Hx and Allergies: Fatmata has a current medication list which includes the following prescription(s): accu-chek guide glucose meter, accu-chek softclix lancets, dropsafe alcohol prep pads, amlodipine, aspirin, accu-chek guide test strips, cetirizine, cholecalciferol (vitamin d3), dapagliflozin propanediol, famotidine, fluticasone propionate, hydrocodone-acetaminophen, lisinopril, biotin, meloxicam, metoprolol succinate, mirtazapine, fish oil-omega-3 fatty acids, ondansetron, pravastatin, and sitagliptin phosphate.   Review of patient's allergies indicates:   Allergen Reactions    Janumet [sitagliptin phos-metformin] Diarrhea    Augmentin [amoxicillin-pot clavulanate] Diarrhea    Clarithromycin Diarrhea     Prior Therapy:  No prior therapy  Social History:  Patient lives with  and 15 year old grandchild. Patient is currently driving  Occupation:  Retired  Prior Level of Function: Independent; able to communicate functionally  Current Level of Function: Independent; able to communicate functionally, more anxiety present regarding "attacks"  Pain Scale: no pain indicated throughout session  Patient's Therapy Goals:  Mrs. Tolbert reports that she wants to talk to dr regarding lowering medication dose. No therapy goals reported.    Smoking: No  Alcohol: No  Caffeine: Approx. 8 oz/day  Water: Approx 1 gallon every 2 days  Reflux: Yes    Objective      Formal Assessment:  VHI-10 (completed to assess self-perceived handicap associated with dysphonia; >11 considered abnormal): 9   RSI (completed to assess the possible presence and/or severity of LPR symptoms and any relationship between this condition and the pt's dysphagia; score of ~15 may indicate LPR): 10    Treatment       No treatment secondary to evaluation.    Education provided:   -role of Speech Therapy, goals/plan of care, scheduling/cancellations, insurance limitations with patient  -Additional " Education provided:   Vocal Hygiene    Patient expressed understanding.     Home Program: Vocal hygiene and breathing techniques, easy onset phonation    Assessment      Fatmata presents to [facility] status post medical diagnosis of muscle tension dysphonia.     Interpretation of objective assessment: Patient would benefit from skilled ST services to establish HEP and ensure understanding of strategies and exercises.  She presents with muscle tension dysphonia characterized by patient report.    Demonstrates impairments including limitations as described in the problem list.     Positive prognostic factors: motivation  Negative prognostic factors: none at this time  Barriers to therapy: No barriers to therapy identified.     Patient's spiritual, cultural, and educational needs considered and patient agreeable to plan of care and goals.    Patient will benefit from skilled therapy.    Rehab Potential: good    Short Term Goals: (4 weeks) Current Progress:   Patient will improve the quality, efficiency, and ease of phonation through resonant and/or airflow exercises from the syllable to conversation level with 80% accuracy.  Progressing/ Not Met 4/18/2024   Established this date   Patient will discriminate between easy and strained phonation with 80% accuracy.      Progressing/ Not Met 4/18/2024   Established this date   Patient will identify the sensations associated with muscle relaxation in the abdominal, thoracic, neck and facial areas during efficient phonation with minimal clinician cue.      Progressing/ Not Met 4/18/2024   Established this date        Long Term Goals: (4 weeks) Current Progress:   Patient and clinician will facilitate changes in vocal function in order to restore functional use of voice for daily occupational, social, and emotional demands.   Established this date           Plan    Recommended Treatment Plan:  Patient will participate in the [facility] rehabilitation program for  speech therapy 1 times per week for 4 weeks to address her  voice  deficits, to educate patient and their family, and to participate in a home exercise program.    Other Recommendations:   Continue ST as needed  Talk to doctor regarding medication concerns    Therapist's Name:   Isabel Olguin, CCC-SLP   4/18/2024           Physician's Signature: _________________________________________ Date: ________________

## 2024-04-29 ENCOUNTER — OFFICE VISIT (OUTPATIENT)
Dept: FAMILY MEDICINE | Facility: CLINIC | Age: 79
End: 2024-04-29
Payer: MEDICARE

## 2024-04-29 DIAGNOSIS — I73.9 PAD (PERIPHERAL ARTERY DISEASE): Primary | ICD-10-CM

## 2024-04-29 PROBLEM — F34.9 PERSISTENT MOOD (AFFECTIVE) DISORDER, UNSPECIFIED: Status: ACTIVE | Noted: 2024-04-29

## 2024-04-29 PROCEDURE — 99213 OFFICE O/P EST LOW 20 MIN: CPT | Mod: ,,, | Performed by: FAMILY MEDICINE

## 2024-04-29 RX ORDER — FUROSEMIDE 20 MG/1
20 TABLET ORAL DAILY
COMMUNITY
Start: 2024-03-13

## 2024-04-29 NOTE — PROGRESS NOTES
[facility]    Chief Complaint      Chief Complaint   Patient presents with    Results       History of Present Illness      Fatmata Tolbert is a 78 y.o. female that  has a past medical history of Acute superficial gastritis without hemorrhage, Anxiety, Cardiomyopathy, Diabetes, polyneuropathy, DJD (degenerative joint disease), multiple sites, Duodenitis, GERD (gastroesophageal reflux disease), HH (hiatus hernia), HTN (hypertension), Hyperlipidemia, ANNA (obstructive sleep apnea), and Pseudophakia.     HPI    The patient presents today to discuss peripheral artery disease screening that she had at home.  The patient's screening test for PID was positive.  I reviewed the results with the patient.    Past Medical History:  Past Medical History:   Diagnosis Date    Acute superficial gastritis without hemorrhage 07/14/2022    Anxiety     Cardiomyopathy     Diabetes, polyneuropathy     DJD (degenerative joint disease), multiple sites     Duodenitis 07/14/2022    GERD (gastroesophageal reflux disease)     HH (hiatus hernia) 07/14/2022    HTN (hypertension)     Hyperlipidemia     ANNA (obstructive sleep apnea)     Pseudophakia 02/09/2021       Past Surgical History:   has a past surgical history that includes Breast biopsy; Hysterectomy; Total abdominal hysterectomy w/ bilateral salpingoophorectomy; Cardiac electrophysiology study and ablation; and heart catherization .    Social History:  Social History     Tobacco Use    Smoking status: Never     Passive exposure: Never    Smokeless tobacco: Never   Substance Use Topics    Alcohol use: Never    Drug use: Never       I personally reviewed all past medical, surgical, and social.     Review of Systems   Constitutional:  Negative for chills and fever.   HENT:  Negative for ear pain and sore throat.    Eyes:  Negative for blurred vision.   Respiratory:  Negative for cough and shortness of breath.    Cardiovascular:  Negative for chest pain and palpitations.    Gastrointestinal:  Negative for abdominal pain and constipation.   Genitourinary:  Negative for dysuria and hematuria.   Musculoskeletal:  Negative for back pain and falls.   Skin:  Negative for itching and rash.   Neurological:  Negative for weakness and headaches.   Endo/Heme/Allergies:  Negative for polydipsia. Does not bruise/bleed easily.   Psychiatric/Behavioral:  Negative for suicidal ideas. The patient does not have insomnia.         Medications:  Outpatient Encounter Medications as of 4/29/2024   Medication Sig Dispense Refill    furosemide (LASIX) 20 MG tablet Take 20 mg by mouth once daily.      ACCU-CHEK GUIDE GLUCOSE METER Misc 1 strip by Misc.(Non-Drug; Combo Route) route once daily. 1 each 0    ACCU-CHEK SOFTCLIX LANCETS Misc TEST BLOOD SUGAR ONE TIME DAILY 100 each 3    alcohol swabs (DROPSAFE ALCOHOL PREP PADS) PadM USE TO CLEANSE THE SKIN PRIOR TO DAILY GLUCOSE CHECK 100 each 3    amLODIPine (NORVASC) 5 MG tablet Take 1 tablet (5 mg total) by mouth once daily. 90 tablet 1    aspirin (ECOTRIN) 81 MG EC tablet Take 81 mg by mouth once daily.      blood sugar diagnostic (ACCU-CHEK GUIDE TEST STRIPS) Strp TEST BLOOD SUGAR EVERY DAY AS DIRECTED 100 strip 3    cetirizine (ZYRTEC) 10 MG tablet Take 1 tablet (10 mg total) by mouth once daily. 90 tablet 1    cholecalciferol, vitamin D3, 125 mcg (5,000 unit) Tab Take 1 tablet (5,000 Units total) by mouth once daily. 90 tablet 1    famotidine (PEPCID) 20 MG tablet TAKE 1 TABLET TWICE DAILY 180 tablet 3    fluticasone propionate (FLONASE) 50 mcg/actuation nasal spray 2 sprays (100 mcg total) by Each Nostril route once daily. 48 g 1    HYDROcodone-acetaminophen (NORCO)  mg per tablet Take 1 tablet by mouth every 6 (six) hours as needed for Pain. 20 tablet 0    lisinopriL (PRINIVIL,ZESTRIL) 40 MG tablet Take 1 tablet (40 mg total) by mouth once daily. 90 tablet 1    SORAYA BIOTIN ORAL Take 3,000 mcg by mouth 2 (two) times a day.      meloxicam (MOBIC) 15  MG tablet Take 1 tablet (15 mg total) by mouth once daily. 90 tablet 1    metoprolol succinate (TOPROL-XL) 100 MG 24 hr tablet Take 1 tablet (100 mg total) by mouth once daily. 90 tablet 1    mirtazapine (REMERON) 7.5 MG Tab Take 1 tablet (7.5 mg total) by mouth every evening. 90 tablet 1    omega-3 fatty acids/fish oil (FISH OIL-OMEGA-3 FATTY ACIDS) 300-1,000 mg capsule Take by mouth once daily. (Patient not taking: Reported on 5/13/2024)      ondansetron (ZOFRAN) 4 MG tablet Take 1 tablet (4 mg total) by mouth every 6 (six) hours as needed for Nausea. 90 tablet 0    pravastatin (PRAVACHOL) 20 MG tablet Take 1 tablet (20 mg total) by mouth nightly. 90 tablet 1    SITagliptin phosphate (JANUVIA) 100 MG Tab Take 1 tablet (100 mg total) by mouth once daily. 90 tablet 1    [DISCONTINUED] dapagliflozin propanediol (FARXIGA) 10 mg tablet Take 1 tablet (10 mg total) by mouth once daily. 30 tablet 3     No facility-administered encounter medications on file as of 4/29/2024.       Allergies:  Review of patient's allergies indicates:   Allergen Reactions    Janumet [sitagliptin phos-metformin] Diarrhea    Amoxicillin Other (See Comments)    Augmentin [amoxicillin-pot clavulanate] Diarrhea    Clarithromycin Diarrhea    Glipizide Other (See Comments)    Metformin hcl Diarrhea       Health Maintenance:  Immunization History   Administered Date(s) Administered    COVID-19 MRNA, LN-S PF (MODERNA HALF 0.25 ML DOSE) 06/10/2022    COVID-19, MRNA, LN-S, PF (MODERNA FULL 0.5 ML DOSE) 02/12/2021, 03/16/2021, 11/05/2021    COVID-19, mRNA, LNP-S, bivalent booster, PF (Moderna Omicron)12 + YEARS 01/13/2023    Influenza (FLUAD) - Quadrivalent - Adjuvanted - PF *Preferred* (65+) 10/24/2022    Influenza - High Dose - PF (65 years and older) 10/20/2023    Influenza - Quadrivalent - High Dose - PF (65 years and older) 01/26/2022    Influenza - Trivalent (ADULT) 01/28/2016    Pneumococcal Conjugate - 20 Valent 01/08/2024    Pneumococcal  Polysaccharide - 23 Valent 10/24/2022    Zoster Recombinant 01/13/2023, 03/16/2023      Health Maintenance   Topic Date Due    TETANUS VACCINE  Never done    Eye Exam  05/01/2024    Lipid Panel  09/25/2024    Hemoglobin A1c  10/26/2024    DEXA Scan  11/10/2026    Hepatitis C Screening  Completed    Shingles Vaccine  Completed        Physical Exam       ]    Physical Exam  Vitals and nursing note reviewed.   Constitutional:       Appearance: Normal appearance.   HENT:      Head: Normocephalic and atraumatic.   Cardiovascular:      Rate and Rhythm: Normal rate and regular rhythm.      Heart sounds: Normal heart sounds.   Pulmonary:      Effort: Pulmonary effort is normal.      Breath sounds: Normal breath sounds.   Skin:     Findings: No rash.   Neurological:      General: No focal deficit present.      Mental Status: She is alert and oriented to person, place, and time.      Gait: Gait normal.   Psychiatric:         Mood and Affect: Mood normal.         Behavior: Behavior normal.         Thought Content: Thought content normal.         Judgment: Judgment normal.          Laboratory:  CBC:  Recent Labs   Lab 07/02/21  1138 02/21/22  0832 06/07/22  0928 09/28/22  0712   WBC 4.89 5.31  --  5.57   RBC 4.09 L 4.00 L  --  4.02 L   Hemoglobin 11.2 L 11.5 L   < > 11.7 L   Hematocrit 35.8 L 35.4 L   < > 36.0 L   Platelet Count 218 186  --  221   MCV 87.5 88.5  --  89.6   MCH 27.4 28.8  --  29.1   MCHC 31.3 L 32.5  --  32.5    < > = values in this interval not displayed.     CMP:  Recent Labs   Lab 10/04/23  0903 01/08/24  0912 04/26/24  0803   Glucose 108 H 87 145 H   Calcium 9.2 10.0 9.3   Albumin 3.7 4.0 3.5   Total Protein 7.2 7.5 6.7   Sodium 142 143 142   Potassium 4.8 4.2 4.1   CO2 30 29 28   Chloride 108 H 107 111 H   BUN 16 13 18   Alk Phos 41 L 39 L 50 L   ALT 19 16 22   AST 12 L 14 L 15   Bilirubin, Total 0.4 0.4 0.3     LIPIDS:  Recent Labs   Lab 10/26/21  0920 09/22/22  0715 09/28/22  0712 09/25/23  0946   TSH  --    --  0.587  --    HDL Cholesterol 71 H 73 H  --  75 H   Cholesterol 138 157  --  158   Triglycerides 74 61  --  57   LDL Calculated 52 72  --  72   Cholesterol/HDL Ratio (Risk Factor) 1.9 2.2  --  2.1   Non-HDL 67 84  --  83     TSH:  Recent Labs   Lab 09/28/22  0712   TSH 0.587     A1C:  Recent Labs   Lab 06/16/21  1333 10/26/21  0920 01/26/22  0905 09/21/22  0852 03/22/23  0832 09/25/23  0901 01/08/24  0912 04/26/24  0803   Hemoglobin A1C 6.6 6.6 6.4 6.4 6.2 6.4 6.2 7.7 H       Assessment/Plan     Fatmata Tolbert is a 78 y.o.female with:    1. PAD (peripheral artery disease)  Overview:  Asymptomatic    Assessment & Plan:  The patient does not want a referral to [facility] this time.  Compression socks as needed          Chronic conditions status updated as per HPI.  Other than changes above, cont current medications and maintain follow up with specialists.  Return to clinic prn if symptoms worsen or fail to improve.    Allie Andersen DO  [facility]

## 2024-04-30 ENCOUNTER — CLINICAL SUPPORT (OUTPATIENT)
Dept: REHABILITATION | Facility: HOSPITAL | Age: 79
End: 2024-04-30
Payer: MEDICARE

## 2024-04-30 DIAGNOSIS — R49.0 MUSCLE TENSION DYSPHONIA: Primary | ICD-10-CM

## 2024-04-30 PROCEDURE — 92507 TX SP LANG VOICE COMM INDIV: CPT

## 2024-04-30 NOTE — PROGRESS NOTES
[facility]  Speech Therapy Treatment Note- Voice     Date: 2024     Name: Fatmata Tolbert   MRN: [id number]   Therapy Diagnosis:   Encounter Diagnosis   Name Primary?    Muscle tension dysphonia Yes     Physician: Darrick Hill MD  Physician Orders: Ambulatory Referral to Speech Therapy Evaluate and treat  Medical Diagnosis: Muscle tension dysphonia    Visit #/ Visits Authorized:   Date of Evaluation:  2024  Insurance Authorization Period: 2024-2025  Plan of Care Expiration Date:    2024   Progress Note: 2024     Time In:  1500  Time Out:  1530  Total Billable Time: 30 minutes     Precautions: Standard    Subjective     Patient reports: Patient reports that yesterday and today were bad days for her voice. She always reports that this is first day that she has taken medication since Phil morning.   She was compliant to home exercise program.   Response to previous treatment: good  Pain Scale: no pain indicated throughout session    Objective        UNTIMED  Procedure   Speech- Language- Voice Therapy     Short Term Goals: (4 weeks) Current Progress:   Patient will improve the quality, efficiency, and ease of phonation through resonant and/or airflow exercises from the syllable to conversation level with 80% accuracy.     Progressing/ Not Met 2024   Completed exercises with 100% acc given mod cues    Patient will discriminate between easy and strained phonation with 80% accuracy.      Progressing/ Not Met 2024   Patient unable to tell difference this date between easy onset and regular phonation independently.    Patient will identify the sensations associated with muscle relaxation in the abdominal, thoracic, neck and facial areas during efficient phonation with minimal clinician cue.       Progressing/ Not Met 2024   Unable to identify this date      Patient Education and Home Program   Patient educated regarding the followin.  Easy onset phonation  2. Breathing exercises    Home program established: Patient instructed to continue prior program  Patient verbalized understanding to all above education provided.     See Electronic Medical Record under Patient Instructions for exercises provided throughout therapy.    Assessment       Fatmata is progressing well towards her goals. Current goals remain appropriate. Goals to be updated as necessary.     Patient prognosis is Fair. Patient will continue to benefit from skilled outpatient speech and language therapy to address the deficits listed in the problem list on initial evaluation, provide patient/family education and to maximize patient's level of independence in the home and community environment.   Medical necessity is demonstrated by the following IMPAIRMENTS:  Muscle tension dysphonia  Barriers to Therapy: None at this time  Patient's spiritual, cultural and educational needs considered and patient agreeable to plan of care and goals.      Plan     Continue Plan of Care with focus on rehabilitation and compensation for voice    Isabel Olguin CCC-SLP   4/30/2024

## 2024-05-07 ENCOUNTER — CLINICAL SUPPORT (OUTPATIENT)
Dept: REHABILITATION | Facility: HOSPITAL | Age: 79
End: 2024-05-07
Payer: MEDICARE

## 2024-05-07 DIAGNOSIS — R49.0 MUSCLE TENSION DYSPHONIA: Primary | ICD-10-CM

## 2024-05-07 PROCEDURE — 92507 TX SP LANG VOICE COMM INDIV: CPT

## 2024-05-07 NOTE — PROGRESS NOTES
"[facility]  Speech Therapy Treatment Note- Voice     Date: 2024     Name: Fatmata Tolbert   MRN: [id number]   Therapy Diagnosis:   No diagnosis found.    Physician: Darrick Hill MD  Physician Orders: Ambulatory Referral to Speech Therapy Evaluate and treat  Medical Diagnosis: Muscle tension dysphonia    Visit #/ Visits Authorized: 3 / 5  Date of Evaluation:  2024  Insurance Authorization Period: 2024-2025  Plan of Care Expiration Date:    2024   Progress Note: 2024     Time In:  1500  Time Out:  1530  Total Billable Time: 30 minutes     Precautions: Standard    Subjective     Patient reports: Patient reports that the past week has been great for voice. She reports that she has been taking her medications "regularly and on the clock."  She was compliant to home exercise program.   Response to previous treatment: good  Pain Scale: no pain indicated throughout session    Objective        UNTIMED  Procedure   Speech- Language- Voice Therapy     Short Term Goals: (4 weeks) Current Progress:   Patient will improve the quality, efficiency, and ease of phonation through resonant and/or airflow exercises from the syllable to conversation level with 80% accuracy.     Progressing/ Not Met 2024   Completed exercises with 100% acc given min cues    Patient will discriminate between easy and strained phonation with 80% accuracy.      Progressing/ Not Met 2024   Patient unable to tell difference this date between easy onset and regular phonation independently.    Patient will identify the sensations associated with muscle relaxation in the abdominal, thoracic, neck and facial areas during efficient phonation with minimal clinician cue.       Progressing/ Not Met 2024   Unable to identify this date, but she reports that she has felt relaxed.     Patient Education and Home Program   Patient educated regarding the followin. Easy onset phonation  2. " Breathing exercises    Home program established: Patient instructed to continue prior program  Patient verbalized understanding to all above education provided.     See Electronic Medical Record under Patient Instructions for exercises provided throughout therapy.    Assessment       Fatmata is progressing well towards her goals. Current goals remain appropriate. Goals to be updated as necessary.     Patient prognosis is Fair. Patient will continue to benefit from skilled outpatient speech and language therapy to address the deficits listed in the problem list on initial evaluation, provide patient/family education and to maximize patient's level of independence in the home and community environment.   Medical necessity is demonstrated by the following IMPAIRMENTS:  Muscle tension dysphonia  Barriers to Therapy: None at this time  Patient's spiritual, cultural and educational needs considered and patient agreeable to plan of care and goals.      Plan     Continue Plan of Care with focus on rehabilitation and compensation for voice    Isabel Olguin, BARON-SLP   5/7/2024

## 2024-05-13 ENCOUNTER — OFFICE VISIT (OUTPATIENT)
Dept: FAMILY MEDICINE | Facility: CLINIC | Age: 79
End: 2024-05-13
Payer: MEDICARE

## 2024-05-13 VITALS
WEIGHT: 161.69 LBS | DIASTOLIC BLOOD PRESSURE: 78 MMHG | OXYGEN SATURATION: 99 % | SYSTOLIC BLOOD PRESSURE: 124 MMHG | TEMPERATURE: 98 F | HEART RATE: 57 BPM | HEIGHT: 66 IN | BODY MASS INDEX: 25.98 KG/M2 | RESPIRATION RATE: 14 BRPM

## 2024-05-13 DIAGNOSIS — Z00.00 ENCOUNTER FOR PREVENTIVE HEALTH EXAMINATION: ICD-10-CM

## 2024-05-13 DIAGNOSIS — Z00.00 ENCOUNTER FOR SUBSEQUENT ANNUAL WELLNESS VISIT (AWV) IN MEDICARE PATIENT: Primary | ICD-10-CM

## 2024-05-13 PROCEDURE — 1160F RVW MEDS BY RX/DR IN RCRD: CPT | Mod: ,,, | Performed by: FAMILY MEDICINE

## 2024-05-13 PROCEDURE — 1158F ADVNC CARE PLAN TLK DOCD: CPT | Mod: ,,, | Performed by: FAMILY MEDICINE

## 2024-05-13 PROCEDURE — 3288F FALL RISK ASSESSMENT DOCD: CPT | Mod: ,,, | Performed by: FAMILY MEDICINE

## 2024-05-13 PROCEDURE — 1170F FXNL STATUS ASSESSED: CPT | Mod: ,,, | Performed by: FAMILY MEDICINE

## 2024-05-13 PROCEDURE — 1159F MED LIST DOCD IN RCRD: CPT | Mod: ,,, | Performed by: FAMILY MEDICINE

## 2024-05-13 PROCEDURE — 1101F PT FALLS ASSESS-DOCD LE1/YR: CPT | Mod: ,,, | Performed by: FAMILY MEDICINE

## 2024-05-13 PROCEDURE — G0439 PPPS, SUBSEQ VISIT: HCPCS | Mod: ,,, | Performed by: FAMILY MEDICINE

## 2024-05-13 PROCEDURE — 3078F DIAST BP <80 MM HG: CPT | Mod: ,,, | Performed by: FAMILY MEDICINE

## 2024-05-13 PROCEDURE — 3074F SYST BP LT 130 MM HG: CPT | Mod: ,,, | Performed by: FAMILY MEDICINE

## 2024-05-13 PROCEDURE — 1125F AMNT PAIN NOTED PAIN PRSNT: CPT | Mod: ,,, | Performed by: FAMILY MEDICINE

## 2024-05-13 NOTE — LETTER
AUTHORIZATION FOR RELEASE OF   CONFIDENTIAL INFORMATION    Dear Dr. Gray,    We are seeing Fatmata Tolbert, date of birth 1945, in the clinic at [facility]. Allie Andersen DO is the patient's PCP. Fatmata Tolbert has an outstanding lab/procedure at the time we reviewed her chart. In order to help keep her health information updated, she has authorized us to request the following medical record(s):        (  )  MAMMOGRAM                                      (  )  COLONOSCOPY      (  )  PAP SMEAR                                          (  )  OUTSIDE LAB RESULTS     (  )  DEXA SCAN                                          ( x ) LAST  EYE EXAM            (  )  FOOT EXAM                                          (  )  ENTIRE RECORD     (  )  OUTSIDE IMMUNIZATIONS                 (  )  _______________         Please fax records to [facility], Mnzava, Christy, DO, [phone]     If you have any questions, please contact Loreta David rn awv nurse   at [phone].           Patient Name: Fatmata Tolbert  : 1945  Patient Phone #: [phone]

## 2024-05-13 NOTE — PATIENT INSTRUCTIONS
Counseling and Referral of Other Preventative  (Italic type indicates deductible and co-insurance are waived)    Patient Name: Fatmata Tolbert  Today's Date: 5/13/2024    Health Maintenance         Date Due Completion Date    TETANUS VACCINE Never done ---    RSV Vaccine (Age 60+ and Pregnant patients) (1 - 1-dose 60+ series) Never done ---    COVID-19 Vaccine (6 - 2023-24 season) 09/01/2023 1/13/2023    Eye Exam 05/01/2024 5/1/2023    Override on 2/9/2021: Done    Lipid Panel 09/25/2024 9/25/2023    Hemoglobin A1c 10/26/2024 4/26/2024    Diabetes Urine Screening 04/26/2025 4/26/2024    DEXA Scan 11/10/2026 11/10/2023          No orders of the defined types were placed in this encounter.    The following information is provided to all patients.  This information is to help you find resources for any of the problems found today that may be affecting your health:                  Living healthy guide: [place].gov    Understanding Diabetes: www.diabetes.org      Eating healthy: www.cdc.gov/healthyweight      CDC home safety checklist: www.cdc.gov/steadi/patient.html      Agency on Aging: [place].gov    Alcoholics anonymous (AA): www.aa.org      Physical Activity: www.jamari.nih.gov/ye9lpka      Tobacco use: [place].gov

## 2024-05-13 NOTE — PROGRESS NOTES
Fatmata Tolbert presented for a  Medicare AWV and comprehensive Health Risk Assessment today. The following components were reviewed and updated:    Medical history  Family History  Social history  Allergies and Current Medications  Health Risk Assessment  Health Maintenance  Care Team         ** See Completed Assessments for Annual Wellness Visit within the encounter summary.**         The following assessments were completed:  Living Situation  CAGE  Depression Screening  Timed Get Up and Go  Whisper Test  Cognitive Function Screening  Nutrition Screening  ADL Screening  PAQ Screening      Opioid documentation:      Patient does have a current opioid prescription.      Patient accepted further discussion regarding opioid medication use.      Patient is currently taking hydrocodone narcotic for hip pain.        Pain level today is 8/10.       In addition to narcotic pain medications, patient is also using acetaminophen for pain control.       Patient is not followed by a specialist currently for their pain and will not be referred today.       Patient's opioid risk potential based on ORT-OUD tool:       Jai each box that applies   No   Yes     Family history of substance abuse   Alcohol [x] []   Illegal drugs [x] []   Rx drugs [x] []     Personal history of substance abuse   Alcohol [x] []   Illegal drugs [x] []   Rx drugs [x] []     Age between 16-45 years   [x]   []     Patient with ADD, OCD, Bipolar disorder, schizoprenia   [x]   []     Patient with depression   [x]   []                         Scoring total                      0                                           Non-opioid treatment options have been discussed today and added to the patient's after visit summary.        Vitals:    05/13/24 0852   BP: 124/78   BP Location: Left arm   Patient Position: Sitting   BP Method: Large (Manual)   Pulse: (!) 57   Resp: 14   Temp: 97.7 °F (36.5 °C)   TempSrc: Oral   SpO2: 99%   Weight: 73.3 kg (161 lb 11.2 oz)  "  Height: 5' 6" (1.676 m)     Body mass index is 26.1 kg/m².  Physical Exam  Vitals and nursing note reviewed.   Constitutional:       Appearance: Normal appearance.   HENT:      Head: Normocephalic and atraumatic.      Right Ear: Hearing and external ear normal.      Left Ear: Hearing and external ear normal.   Eyes:      Pupils: Pupils are equal, round, and reactive to light.   Cardiovascular:      Heart sounds: Normal heart sounds.   Pulmonary:      Breath sounds: Normal breath sounds.   Skin:     Findings: No rash.   Neurological:      Mental Status: She is alert and oriented to person, place, and time. Mental status is at baseline.      Gait: Gait normal.   Psychiatric:         Mood and Affect: Mood normal.         Behavior: Behavior normal.         Thought Content: Thought content normal.         Judgment: Judgment normal.               Diagnoses and health risks identified today and associated recommendations/orders:    1. Encounter for preventive health examination      2. BMI 26.0-26.9,adult  Diet/exercise    Provided Fatmata with a 5-10 year written screening schedule and personal prevention plan. Recommendations were developed using the USPSTF age appropriate recommendations. Education, counseling, and referrals were provided as needed. After Visit Summary printed and given to patient which includes a list of additional screenings\tests needed.    Declines tetanus, rsv and covid booster vaccines  Fam dr. Bruno and for eye exam report     Follow up in about 1 year (around 5/14/2025) for in 1 year for annual wellness visit at 8:00 a.m. .    KAIN VAUGHAN RN      I offered to discuss advanced care planning, including how to pick a person who would make decisions for you if you were unable to make them for yourself, called a health care power of , and what kind of decisions you might make such as use of life sustaining treatments such as ventilators and tube feeding when faced with a life limiting " illness recorded on a living will that they will need to know. (How you want to be cared for as you near the end of your natural life)     X Patient is interested in learning more about how to make advanced directives.  I provided them paperwork and offered to discuss this with them.

## 2024-05-21 ENCOUNTER — CLINICAL SUPPORT (OUTPATIENT)
Dept: REHABILITATION | Facility: HOSPITAL | Age: 79
End: 2024-05-21
Payer: MEDICARE

## 2024-05-21 DIAGNOSIS — R49.0 MUSCLE TENSION DYSPHONIA: Primary | ICD-10-CM

## 2024-05-21 PROCEDURE — 92507 TX SP LANG VOICE COMM INDIV: CPT

## 2024-05-23 DIAGNOSIS — R73.9 HYPERGLYCEMIA: ICD-10-CM

## 2024-05-23 DIAGNOSIS — E13.9 DIABETES 1.5, MANAGED AS TYPE 2: ICD-10-CM

## 2024-05-23 RX ORDER — DAPAGLIFLOZIN 10 MG/1
10 TABLET, FILM COATED ORAL DAILY
Qty: 30 TABLET | Refills: 3 | Status: SHIPPED | OUTPATIENT
Start: 2024-05-23 | End: 2024-06-18

## 2024-05-23 NOTE — PROGRESS NOTES
"[facility]  Speech Therapy Treatment Note- Voice     Date: 5/21/2024     Name: Fatmata Tolbert   MRN: [id number]   Therapy Diagnosis:   Encounter Diagnosis   Name Primary?    Muscle tension dysphonia Yes       Physician: Darrick Hill MD  Physician Orders: Ambulatory Referral to Speech Therapy Evaluate and treat  Medical Diagnosis: Muscle tension dysphonia    Visit #/ Visits Authorized: 4 / 5  Date of Evaluation:  04/18/2024  Insurance Authorization Period: 04/01/2024-04/01/2025  Plan of Care Expiration Date:    05/18/2024   Progress Note: 05/18/2024     Time In:  1400  Time Out:  1435  Total Billable Time: 35 minutes     Precautions: Standard    Subjective     Patient reports: Patient reports that the past week has been great for voice. She reports that she has been taking her medications "regularly and on the clock."  She was compliant to home exercise program.   Response to previous treatment: good  Pain Scale: no pain indicated throughout session    Objective        UNTIMED  Procedure   Speech- Language- Voice Therapy     Short Term Goals: (4 weeks) Current Progress:   Patient will improve the quality, efficiency, and ease of phonation through resonant and/or airflow exercises from the syllable to conversation level with 80% accuracy.     Progressing/ Not Met 5/21/2024   Completed exercises with 100% acc given min cues    Patient will discriminate between easy and strained phonation with 80% accuracy.      Progressing/ Not Met 5/21/2024   Patient unable to tell difference this date between easy onset and regular phonation independently.    Patient will identify the sensations associated with muscle relaxation in the abdominal, thoracic, neck and facial areas during efficient phonation with minimal clinician cue.       Progressing/ Not Met 5/21/2024   Unable to identify this date, but she reports that she has felt relaxed.     Patient Education and Home Program   Patient educated " regarding the followin. Easy onset phonation  2. Breathing exercises    Home program established: Patient instructed to continue prior program  Patient verbalized understanding to all above education provided.     See Electronic Medical Record under Patient Instructions for exercises provided throughout therapy.    Assessment       Fatmata is progressing well towards her goals. Current goals remain appropriate. Goals to be updated as necessary.     Patient only seen for 35 minutes this date per patient request.    Patient prognosis is Fair. Patient will continue to benefit from skilled outpatient speech and language therapy to address the deficits listed in the problem list on initial evaluation, provide patient/family education and to maximize patient's level of independence in the home and community environment.   Medical necessity is demonstrated by the following IMPAIRMENTS:  Muscle tension dysphonia  Barriers to Therapy: None at this time  Patient's spiritual, cultural and educational needs considered and patient agreeable to plan of care and goals.      Plan     Continue Plan of Care with focus on rehabilitation and compensation for voice    Isabel Olguin, BARON-SLP   2024

## 2024-05-23 NOTE — TELEPHONE ENCOUNTER
----- Message from Renee Middleton sent at 5/22/2024 10:40 AM CDT -----  Regarding: Med Refill  Pt called requesting refill of dapagliflozin propanediol (FARXIGA) 10 mg tablet.     Pharmacy: [facility] on [address]  Pt phone #: [phone]

## 2024-05-23 NOTE — PLAN OF CARE
"[facility]  Speech Therapy Updated Plan of Care - Voice     Date: 2024     Name: Fatmata Tolbert   MRN: [id number]   Therapy Diagnosis:   Muscle tension dysphonia    Physician: Darrick Hill MD  Physician Orders: Ambulatory Referral to Speech Therapy Evaluate and treat  Medical Diagnosis: Muscle tension dysphonia    Visit #/ Visits Authorized:   Date of Evaluation:  2024  Insurance Authorization Period: 2024-2025  Plan of Care Expiration Date:    2024   Progress Note: 2024     Time In:  1400  Time Out:  1435  Total Billable Time: 35 minutes     Precautions: Standard    Subjective     Patient reports: Patient reports that the past week has been great for voice. She reports that she has been taking her medications "regularly and on the clock."  She was compliant to home exercise program.   Response to previous treatment: good  Pain Scale: no pain indicated throughout session    Objective        UNTIMED  Procedure   Speech- Language- Voice Therapy     Short Term Goals: (4 weeks) Current Progress:   Patient will improve the quality, efficiency, and ease of phonation through resonant and/or airflow exercises from the syllable to conversation level with 80% accuracy.     Progressing/ Not Met 2024   Completed exercises with 100% acc given min cues    Patient will discriminate between easy and strained phonation with 80% accuracy.      Progressing/ Not Met 2024   Patient unable to tell difference this date between easy onset and regular phonation independently.    Patient will identify the sensations associated with muscle relaxation in the abdominal, thoracic, neck and facial areas during efficient phonation with minimal clinician cue.       Progressing/ Not Met 2024   Unable to identify this date, but she reports that she has felt relaxed.     Patient Education and Home Program   Patient educated regarding the followin. Easy onset " phonation  2. Breathing exercises    Home program established: Patient instructed to continue prior program  Patient verbalized understanding to all above education provided.     See Electronic Medical Record under Patient Instructions for exercises provided throughout therapy.    Assessment       Fatmata is progressing well towards her goals. Current goals remain appropriate. Goals to be updated as necessary.     Patient only seen for 35 minutes this date per patient request.    Patient prognosis is Fair. Patient will continue to benefit from skilled outpatient speech and language therapy to address the deficits listed in the problem list on initial evaluation, provide patient/family education and to maximize patient's level of independence in the home and community environment.   Medical necessity is demonstrated by the following IMPAIRMENTS:  Muscle tension dysphonia  Barriers to Therapy: None at this time  Patient's spiritual, cultural and educational needs considered and patient agreeable to plan of care and goals.      Plan     Continue Plan of Care with focus on rehabilitation and compensation for voice. Decrease frequency to 1x/month.    Isabel Olguin CCC-SLP   5/21/2024

## 2024-05-29 PROBLEM — I73.9 PAD (PERIPHERAL ARTERY DISEASE): Status: ACTIVE | Noted: 2024-05-29

## 2024-06-04 ENCOUNTER — HOSPITAL ENCOUNTER (OUTPATIENT)
Dept: RADIOLOGY | Facility: HOSPITAL | Age: 79
Discharge: HOME | End: 2024-06-04
Payer: MEDICARE

## 2024-06-04 DIAGNOSIS — Z12.31 VISIT FOR SCREENING MAMMOGRAM: ICD-10-CM

## 2024-06-04 PROCEDURE — 77063 BREAST TOMOSYNTHESIS BI: CPT | Mod: TC

## 2024-06-04 PROCEDURE — 77063 BREAST TOMOSYNTHESIS BI: CPT | Mod: 26,,, | Performed by: RADIOLOGY

## 2024-06-04 PROCEDURE — 77067 SCR MAMMO BI INCL CAD: CPT | Mod: 26,,, | Performed by: RADIOLOGY

## 2024-06-05 DIAGNOSIS — R73.9 HYPERGLYCEMIA: ICD-10-CM

## 2024-06-05 DIAGNOSIS — E13.9 DIABETES 1.5, MANAGED AS TYPE 2: ICD-10-CM

## 2024-06-18 ENCOUNTER — OFFICE VISIT (OUTPATIENT)
Dept: FAMILY MEDICINE | Facility: CLINIC | Age: 79
End: 2024-06-18
Payer: MEDICARE

## 2024-06-18 VITALS
HEIGHT: 66 IN | WEIGHT: 162 LBS | HEART RATE: 65 BPM | OXYGEN SATURATION: 96 % | SYSTOLIC BLOOD PRESSURE: 136 MMHG | DIASTOLIC BLOOD PRESSURE: 64 MMHG | BODY MASS INDEX: 26.03 KG/M2

## 2024-06-18 DIAGNOSIS — I10 PRIMARY HYPERTENSION: ICD-10-CM

## 2024-06-18 DIAGNOSIS — Z79.899 OTHER LONG TERM (CURRENT) DRUG THERAPY: ICD-10-CM

## 2024-06-18 DIAGNOSIS — E11.3599 TYPE 2 DIABETES MELLITUS WITH PROLIFERATIVE RETINOPATHY WITHOUT MACULAR EDEMA, WITHOUT LONG-TERM CURRENT USE OF INSULIN, UNSPECIFIED LATERALITY: Primary | ICD-10-CM

## 2024-06-18 PROBLEM — G47.33 OBSTRUCTIVE SLEEP APNEA: Status: ACTIVE | Noted: 2024-06-18

## 2024-06-18 LAB
25(OH)D3 SERPL-MCNC: 94 NG/ML
BASOPHILS # BLD AUTO: 0.02 K/UL (ref 0–0.2)
BASOPHILS NFR BLD AUTO: 0.4 % (ref 0–1)
DIFFERENTIAL METHOD BLD: ABNORMAL
EOSINOPHIL # BLD AUTO: 0.2 K/UL (ref 0–0.5)
EOSINOPHIL NFR BLD AUTO: 3.5 % (ref 1–4)
ERYTHROCYTE [DISTWIDTH] IN BLOOD BY AUTOMATED COUNT: 13.2 % (ref 11.5–14.5)
GLUCOSE SERPL-MCNC: 238 MG/DL (ref 70–110)
HCT VFR BLD AUTO: 38.6 % (ref 38–47)
HGB BLD-MCNC: 11.9 G/DL (ref 12–16)
IMM GRANULOCYTES # BLD AUTO: 0.02 K/UL (ref 0–0.04)
IMM GRANULOCYTES NFR BLD: 0.4 % (ref 0–0.4)
LYMPHOCYTES # BLD AUTO: 2.09 K/UL (ref 1–4.8)
LYMPHOCYTES NFR BLD AUTO: 36.9 % (ref 27–41)
MCH RBC QN AUTO: 27.4 PG (ref 27–31)
MCHC RBC AUTO-ENTMCNC: 30.8 G/DL (ref 32–36)
MCV RBC AUTO: 88.9 FL (ref 80–96)
MONOCYTES # BLD AUTO: 0.41 K/UL (ref 0–0.8)
MONOCYTES NFR BLD AUTO: 7.2 % (ref 2–6)
MPC BLD CALC-MCNC: 12.5 FL (ref 9.4–12.4)
NEUTROPHILS # BLD AUTO: 2.93 K/UL (ref 1.8–7.7)
NEUTROPHILS NFR BLD AUTO: 51.6 % (ref 53–65)
NRBC # BLD AUTO: 0 X10E3/UL
NRBC, AUTO (.00): 0 %
PLATELET # BLD AUTO: 182 K/UL (ref 150–400)
RBC # BLD AUTO: 4.34 M/UL (ref 4.2–5.4)
WBC # BLD AUTO: 5.67 K/UL (ref 4.5–11)

## 2024-06-18 PROCEDURE — 82306 VITAMIN D 25 HYDROXY: CPT | Mod: ,,, | Performed by: CLINICAL MEDICAL LABORATORY

## 2024-06-18 PROCEDURE — 99214 OFFICE O/P EST MOD 30 MIN: CPT | Mod: ,,, | Performed by: FAMILY MEDICINE

## 2024-06-18 PROCEDURE — 1160F RVW MEDS BY RX/DR IN RCRD: CPT | Mod: ,,, | Performed by: FAMILY MEDICINE

## 2024-06-18 PROCEDURE — 3078F DIAST BP <80 MM HG: CPT | Mod: ,,, | Performed by: FAMILY MEDICINE

## 2024-06-18 PROCEDURE — 1101F PT FALLS ASSESS-DOCD LE1/YR: CPT | Mod: ,,, | Performed by: FAMILY MEDICINE

## 2024-06-18 PROCEDURE — 1159F MED LIST DOCD IN RCRD: CPT | Mod: ,,, | Performed by: FAMILY MEDICINE

## 2024-06-18 PROCEDURE — 3075F SYST BP GE 130 - 139MM HG: CPT | Mod: ,,, | Performed by: FAMILY MEDICINE

## 2024-06-18 PROCEDURE — 85025 COMPLETE CBC W/AUTO DIFF WBC: CPT | Mod: ,,, | Performed by: CLINICAL MEDICAL LABORATORY

## 2024-06-18 PROCEDURE — 3288F FALL RISK ASSESSMENT DOCD: CPT | Mod: ,,, | Performed by: FAMILY MEDICINE

## 2024-06-18 PROCEDURE — G2211 COMPLEX E/M VISIT ADD ON: HCPCS | Mod: ,,, | Performed by: FAMILY MEDICINE

## 2024-06-18 RX ORDER — PREGABALIN 75 MG/1
75 CAPSULE ORAL DAILY
COMMUNITY
Start: 2024-05-31

## 2024-06-18 RX ORDER — GLIPIZIDE 2.5 MG/1
TABLET, EXTENDED RELEASE ORAL
Qty: 30 TABLET | Refills: 1 | Status: SHIPPED | OUTPATIENT
Start: 2024-06-18 | End: 2024-07-01 | Stop reason: SDUPTHER

## 2024-06-19 RX ORDER — DAPAGLIFLOZIN 10 MG/1
10 TABLET, FILM COATED ORAL DAILY
Qty: 30 TABLET | Refills: 3 | OUTPATIENT
Start: 2024-06-19

## 2024-07-01 DIAGNOSIS — E11.3599 TYPE 2 DIABETES MELLITUS WITH PROLIFERATIVE RETINOPATHY WITHOUT MACULAR EDEMA, WITHOUT LONG-TERM CURRENT USE OF INSULIN, UNSPECIFIED LATERALITY: ICD-10-CM

## 2024-07-01 NOTE — PROGRESS NOTES
[facility]    Chief Complaint      Chief Complaint   Patient presents with    Diabetes     Glucose still elevated states runs 200-300 in the mornings.        History of Present Illness      Fatmata Tolbert is a 79 y.o. female that  has a past medical history of Acute superficial gastritis without hemorrhage, Anxiety, Cardiomyopathy, Diabetes, polyneuropathy, DJD (degenerative joint disease), multiple sites, Duodenitis, GERD (gastroesophageal reflux disease), HH (hiatus hernia), HTN (hypertension), Hyperlipidemia, ANNA (obstructive sleep apnea), and Pseudophakia.     HPI    The patient presents today for hyperglycemia.        Past Medical History:  Past Medical History:   Diagnosis Date    Acute superficial gastritis without hemorrhage 07/14/2022    Anxiety     Cardiomyopathy     Diabetes, polyneuropathy     DJD (degenerative joint disease), multiple sites     Duodenitis 07/14/2022    GERD (gastroesophageal reflux disease)     HH (hiatus hernia) 07/14/2022    HTN (hypertension)     Hyperlipidemia     ANNA (obstructive sleep apnea)     Pseudophakia 02/09/2021       Past Surgical History:   has a past surgical history that includes Breast biopsy; Hysterectomy; Total abdominal hysterectomy w/ bilateral salpingoophorectomy; Cardiac electrophysiology study and ablation; and heart catherization .    Social History:  Social History     Tobacco Use    Smoking status: Never     Passive exposure: Never    Smokeless tobacco: Never   Substance Use Topics    Alcohol use: Never    Drug use: Never       I personally reviewed all past medical, surgical, and social.     Review of Systems   Constitutional:  Negative for chills and fever.   HENT:  Negative for ear pain and sore throat.    Eyes:  Negative for blurred vision.   Respiratory:  Negative for cough and shortness of breath.    Cardiovascular:  Negative for chest pain and palpitations.   Gastrointestinal:  Negative for abdominal pain and constipation.   Genitourinary:   Negative for dysuria and hematuria.   Musculoskeletal:  Negative for back pain and falls.   Skin:  Negative for itching and rash.   Neurological:  Negative for weakness and headaches.   Endo/Heme/Allergies:  Negative for polydipsia. Does not bruise/bleed easily.   Psychiatric/Behavioral:  Negative for suicidal ideas. The patient does not have insomnia.         Medications:  Outpatient Encounter Medications as of 6/18/2024   Medication Sig Dispense Refill    ACCU-CHEK GUIDE GLUCOSE METER Misc 1 strip by Misc.(Non-Drug; Combo Route) route once daily. 1 each 0    ACCU-CHEK SOFTCLIX LANCETS Misc TEST BLOOD SUGAR ONE TIME DAILY 100 each 3    alcohol swabs (DROPSAFE ALCOHOL PREP PADS) PadM USE TO CLEANSE THE SKIN PRIOR TO DAILY GLUCOSE CHECK 100 each 3    amLODIPine (NORVASC) 5 MG tablet Take 1 tablet (5 mg total) by mouth once daily. 90 tablet 1    aspirin (ECOTRIN) 81 MG EC tablet Take 81 mg by mouth once daily.      blood sugar diagnostic (ACCU-CHEK GUIDE TEST STRIPS) Str TEST BLOOD SUGAR EVERY DAY AS DIRECTED 100 strip 3    cetirizine (ZYRTEC) 10 MG tablet Take 1 tablet (10 mg total) by mouth once daily. 90 tablet 1    cholecalciferol, vitamin D3, 125 mcg (5,000 unit) Tab Take 1 tablet (5,000 Units total) by mouth once daily. 90 tablet 1    famotidine (PEPCID) 20 MG tablet TAKE 1 TABLET TWICE DAILY 180 tablet 3    furosemide (LASIX) 20 MG tablet Take 20 mg by mouth once daily.      lisinopriL (PRINIVIL,ZESTRIL) 40 MG tablet Take 1 tablet (40 mg total) by mouth once daily. 90 tablet 1    SORAYA BIOTIN ORAL Take 3,000 mcg by mouth 2 (two) times a day.      meloxicam (MOBIC) 15 MG tablet Take 1 tablet (15 mg total) by mouth once daily. 90 tablet 1    metoprolol succinate (TOPROL-XL) 100 MG 24 hr tablet Take 1 tablet (100 mg total) by mouth once daily. 90 tablet 1    mirtazapine (REMERON) 7.5 MG Tab Take 1 tablet (7.5 mg total) by mouth every evening. 90 tablet 1    ondansetron (ZOFRAN) 4 MG tablet Take 1 tablet (4 mg  total) by mouth every 6 (six) hours as needed for Nausea. 90 tablet 0    pravastatin (PRAVACHOL) 20 MG tablet Take 1 tablet (20 mg total) by mouth nightly. 90 tablet 1    pregabalin (LYRICA) 75 MG capsule Take 75 mg by mouth once daily.      SITagliptin phosphate (JANUVIA) 100 MG Tab Take 1 tablet (100 mg total) by mouth once daily. 90 tablet 1    [DISCONTINUED] dapagliflozin propanediol (FARXIGA) 10 mg tablet Take 1 tablet (10 mg total) by mouth once daily. 30 tablet 3    empagliflozin (JARDIANCE) 25 mg tablet Take 1 tablet (25 mg total) by mouth once daily. 90 tablet 1    fluticasone propionate (FLONASE) 50 mcg/actuation nasal spray 2 sprays (100 mcg total) by Each Nostril route once daily. (Patient not taking: Reported on 6/18/2024) 48 g 1    glipiZIDE (GLUCOTROL) 2.5 MG TR24 1-2 tabs PO daily 30 tablet 1    HYDROcodone-acetaminophen (NORCO)  mg per tablet Take 1 tablet by mouth every 6 (six) hours as needed for Pain. (Patient not taking: Reported on 6/18/2024) 20 tablet 0    omega-3 fatty acids/fish oil (FISH OIL-OMEGA-3 FATTY ACIDS) 300-1,000 mg capsule Take by mouth once daily. (Patient not taking: Reported on 5/13/2024)       No facility-administered encounter medications on file as of 6/18/2024.       Allergies:  Review of patient's allergies indicates:   Allergen Reactions    Janumet [sitagliptin phos-metformin] Diarrhea    Amoxicillin Other (See Comments)    Augmentin [amoxicillin-pot clavulanate] Diarrhea    Clarithromycin Diarrhea    Metformin hcl Diarrhea       Health Maintenance:  Immunization History   Administered Date(s) Administered    COVID-19 MRNA, LN-S PF (MODERNA HALF 0.25 ML DOSE) 06/10/2022    COVID-19, MRNA, LN-S, PF (MODERNA FULL 0.5 ML DOSE) 02/12/2021, 03/16/2021, 11/05/2021    COVID-19, mRNA, LNP-S, bivalent booster, PF (Moderna Omicron)12 + YEARS 01/13/2023    Influenza (FLUAD) - Quadrivalent - Adjuvanted - PF *Preferred* (65+) 10/24/2022    Influenza - High Dose - PF (65 years  "and older) 10/20/2023    Influenza - Quadrivalent - High Dose - PF (65 years and older) 01/26/2022    Influenza - Trivalent (ADULT) 01/28/2016    Pneumococcal Conjugate - 20 Valent 01/08/2024    Pneumococcal Polysaccharide - 23 Valent 10/24/2022    Zoster Recombinant 01/13/2023, 03/16/2023      Health Maintenance   Topic Date Due    TETANUS VACCINE  Never done    Lipid Panel  09/25/2024    Hemoglobin A1c  10/26/2024    Eye Exam  05/01/2025    DEXA Scan  11/10/2026    Hepatitis C Screening  Completed    Shingles Vaccine  Completed        Physical Exam      Vital Signs  Pulse: 65  SpO2: 96 %  BP: 136/64  Height and Weight  Height: 5' 6" (167.6 cm)  Weight: 73.5 kg (162 lb)  BSA (Calculated - sq m): 1.85 sq meters  BMI (Calculated): 26.2  Weight in (lb) to have BMI = 25: 154.6]    Physical Exam  Vitals and nursing note reviewed.   Constitutional:       Appearance: Normal appearance.   HENT:      Head: Normocephalic and atraumatic.   Cardiovascular:      Rate and Rhythm: Normal rate and regular rhythm.      Heart sounds: Normal heart sounds.   Pulmonary:      Effort: Pulmonary effort is normal.      Breath sounds: Normal breath sounds.   Skin:     Findings: No rash.   Neurological:      General: No focal deficit present.      Mental Status: She is alert and oriented to person, place, and time.      Gait: Gait normal.   Psychiatric:         Mood and Affect: Mood normal.         Behavior: Behavior normal.         Thought Content: Thought content normal.         Judgment: Judgment normal.          Laboratory:  CBC:  Recent Labs   Lab 02/21/22  0832 06/07/22  0928 09/28/22  0712 06/18/24  1320   WBC 5.31  --  5.57 5.67   RBC 4.00 L  --  4.02 L 4.34   Hemoglobin 11.5 L   < > 11.7 L 11.9 L   Hematocrit 35.4 L   < > 36.0 L 38.6   Platelet Count 186  --  221 182   MCV 88.5  --  89.6 88.9   MCH 28.8  --  29.1 27.4   MCHC 32.5  --  32.5 30.8 L    < > = values in this interval not displayed.     CMP:  Recent Labs   Lab " 10/04/23  0903 01/08/24  0912 04/26/24  0803   Glucose 108 H 87 145 H   Calcium 9.2 10.0 9.3   Albumin 3.7 4.0 3.5   Total Protein 7.2 7.5 6.7   Sodium 142 143 142   Potassium 4.8 4.2 4.1   CO2 30 29 28   Chloride 108 H 107 111 H   BUN 16 13 18   Alk Phos 41 L 39 L 50 L   ALT 19 16 22   AST 12 L 14 L 15   Bilirubin, Total 0.4 0.4 0.3     LIPIDS:  Recent Labs   Lab 10/26/21  0920 09/22/22  0715 09/28/22  0712 09/25/23  0946   TSH  --   --  0.587  --    HDL Cholesterol 71 H 73 H  --  75 H   Cholesterol 138 157  --  158   Triglycerides 74 61  --  57   LDL Calculated 52 72  --  72   Cholesterol/HDL Ratio (Risk Factor) 1.9 2.2  --  2.1   Non-HDL 67 84  --  83     TSH:  Recent Labs   Lab 09/28/22  0712   TSH 0.587     A1C:  Recent Labs   Lab 10/26/21  0920 01/26/22  0905 09/21/22  0852 03/22/23  0832 09/25/23  0901 01/08/24  0912 04/26/24  0803   Hemoglobin A1C 6.6 6.4 6.4 6.2 6.4 6.2 7.7 H       Assessment/Plan     Fatmata Tolbert is a 79 y.o.female with:    1. 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  Dispense: 90 tablet; Refill: 1  -     glipiZIDE (GLUCOTROL) 2.5 MG TR24; 1-2 tabs PO daily  Dispense: 30 tablet; Refill: 1  -     CBC Auto Differential; Future; Expected date: 06/18/2024    2. Primary hypertension  -     Hypertension Digital Medicine (HDMP) Enrollment Order    3. Other long term (current) drug therapy  -     Vitamin D; Future; Expected date: 06/18/2024  -     CBC Auto Differential; Future; Expected date: 06/18/2024        Visit today included increased complexity associated with managing the longitudinal care of the patient due to the serious and/or complex managed problem(s) of  type 2 diabetes.     Chronic conditions status updated as  per HPI.  Other than changes above, cont current medications and maintain follow up with specialists.  Return to clinic in 1 month(s) for uncontrolled diabetes.    DO Avery [facility]ia[facility]

## 2024-07-01 NOTE — TELEPHONE ENCOUNTER
----- Message from Allie Andersen DO sent at 6/20/2024  4:48 PM CDT -----  The patient's anemia has not changed since last year (very mild).  Her vitamin-D is normal.

## 2024-07-01 NOTE — TELEPHONE ENCOUNTER
Notified patient of results. Patient voiced understanding. Patient requested for the glipizide 5mg to be sent in instead of the 2.5mg. Notified patient that I would send to Dr. Andersen for review.

## 2024-07-02 RX ORDER — GLIPIZIDE 5 MG/1
5 TABLET, FILM COATED, EXTENDED RELEASE ORAL
Qty: 30 TABLET | Refills: 3 | Status: SHIPPED | OUTPATIENT
Start: 2024-07-02

## 2024-08-09 DIAGNOSIS — Z71.89 COMPLEX CARE COORDINATION: ICD-10-CM

## 2024-08-13 ENCOUNTER — OFFICE VISIT (OUTPATIENT)
Dept: FAMILY MEDICINE | Facility: CLINIC | Age: 79
End: 2024-08-13
Payer: MEDICARE

## 2024-08-13 VITALS
DIASTOLIC BLOOD PRESSURE: 72 MMHG | TEMPERATURE: 98 F | OXYGEN SATURATION: 97 % | WEIGHT: 162.44 LBS | HEIGHT: 66 IN | HEART RATE: 85 BPM | BODY MASS INDEX: 26.11 KG/M2 | SYSTOLIC BLOOD PRESSURE: 124 MMHG

## 2024-08-13 DIAGNOSIS — F34.9 PERSISTENT MOOD (AFFECTIVE) DISORDER, UNSPECIFIED: ICD-10-CM

## 2024-08-13 DIAGNOSIS — E11.3599 TYPE 2 DIABETES MELLITUS WITH PROLIFERATIVE RETINOPATHY WITHOUT MACULAR EDEMA, WITHOUT LONG-TERM CURRENT USE OF INSULIN, UNSPECIFIED LATERALITY: Primary | ICD-10-CM

## 2024-08-13 DIAGNOSIS — F32.A DEPRESSION, UNSPECIFIED DEPRESSION TYPE: ICD-10-CM

## 2024-08-13 DIAGNOSIS — E78.00 PURE HYPERCHOLESTEROLEMIA: ICD-10-CM

## 2024-08-13 DIAGNOSIS — G89.29 CHRONIC LEFT-SIDED LOW BACK PAIN WITH LEFT-SIDED SCIATICA: ICD-10-CM

## 2024-08-13 DIAGNOSIS — M54.42 CHRONIC LEFT-SIDED LOW BACK PAIN WITH LEFT-SIDED SCIATICA: ICD-10-CM

## 2024-08-13 DIAGNOSIS — I10 PRIMARY HYPERTENSION: ICD-10-CM

## 2024-08-13 PROCEDURE — 3074F SYST BP LT 130 MM HG: CPT | Mod: ,,, | Performed by: FAMILY MEDICINE

## 2024-08-13 PROCEDURE — 1101F PT FALLS ASSESS-DOCD LE1/YR: CPT | Mod: ,,, | Performed by: FAMILY MEDICINE

## 2024-08-13 PROCEDURE — 3078F DIAST BP <80 MM HG: CPT | Mod: ,,, | Performed by: FAMILY MEDICINE

## 2024-08-13 PROCEDURE — 1160F RVW MEDS BY RX/DR IN RCRD: CPT | Mod: ,,, | Performed by: FAMILY MEDICINE

## 2024-08-13 PROCEDURE — 3288F FALL RISK ASSESSMENT DOCD: CPT | Mod: ,,, | Performed by: FAMILY MEDICINE

## 2024-08-13 PROCEDURE — 1159F MED LIST DOCD IN RCRD: CPT | Mod: ,,, | Performed by: FAMILY MEDICINE

## 2024-08-13 PROCEDURE — 99214 OFFICE O/P EST MOD 30 MIN: CPT | Mod: ,,, | Performed by: FAMILY MEDICINE

## 2024-08-13 PROCEDURE — G2211 COMPLEX E/M VISIT ADD ON: HCPCS | Mod: ,,, | Performed by: FAMILY MEDICINE

## 2024-08-13 RX ORDER — DULAGLUTIDE 0.75 MG/.5ML
INJECTION, SOLUTION SUBCUTANEOUS
COMMUNITY
Start: 2024-07-24

## 2024-08-13 RX ORDER — LISINOPRIL 40 MG/1
40 TABLET ORAL DAILY
Qty: 90 TABLET | Refills: 1 | Status: SHIPPED | OUTPATIENT
Start: 2024-08-13 | End: 2026-09-09

## 2024-08-13 RX ORDER — AMLODIPINE BESYLATE 5 MG/1
5 TABLET ORAL DAILY
Qty: 90 TABLET | Refills: 1 | Status: SHIPPED | OUTPATIENT
Start: 2024-08-13 | End: 2025-08-13

## 2024-08-13 RX ORDER — MIRTAZAPINE 7.5 MG/1
7.5 TABLET, FILM COATED ORAL NIGHTLY
Qty: 90 TABLET | Refills: 1 | Status: SHIPPED | OUTPATIENT
Start: 2024-08-13

## 2024-08-13 RX ORDER — MELOXICAM 15 MG/1
15 TABLET ORAL DAILY
Qty: 90 TABLET | Refills: 1 | Status: SHIPPED | OUTPATIENT
Start: 2024-08-13

## 2024-08-13 RX ORDER — PRAVASTATIN SODIUM 20 MG/1
20 TABLET ORAL NIGHTLY
Qty: 90 TABLET | Refills: 1 | Status: SHIPPED | OUTPATIENT
Start: 2024-08-13

## 2024-08-13 NOTE — PROGRESS NOTES
[facility]    Chief Complaint      Chief Complaint   Patient presents with    Follow-up     3 month follow up.       History of Present Illness      Fatmata Tolbert is a 79 y.o. female that  has a past medical history of Acute superficial gastritis without hemorrhage, Anxiety, Cardiomyopathy, Diabetes, polyneuropathy, DJD (degenerative joint disease), multiple sites, Duodenitis, GERD (gastroesophageal reflux disease), HH (hiatus hernia), HTN (hypertension), Hyperlipidemia, ANNA (obstructive sleep apnea), and Pseudophakia.     HPI    The patient presents today for a three month follow up visit for Type 2 Diabetes.She has no new complaints        Past Medical History:  Past Medical History:   Diagnosis Date    Acute superficial gastritis without hemorrhage 07/14/2022    Anxiety     Cardiomyopathy     Diabetes, polyneuropathy     DJD (degenerative joint disease), multiple sites     Duodenitis 07/14/2022    GERD (gastroesophageal reflux disease)     HH (hiatus hernia) 07/14/2022    HTN (hypertension)     Hyperlipidemia     ANNA (obstructive sleep apnea)     Pseudophakia 02/09/2021       Past Surgical History:   has a past surgical history that includes Breast biopsy; Hysterectomy; Total abdominal hysterectomy w/ bilateral salpingoophorectomy; Cardiac electrophysiology study and ablation; and heart catherization .    Social History:  Social History     Tobacco Use    Smoking status: Never     Passive exposure: Never    Smokeless tobacco: Never   Substance Use Topics    Alcohol use: Never    Drug use: Never       I personally reviewed all past medical, surgical, and social.     Review of Systems   Constitutional:  Negative for chills and fever.   HENT:  Negative for ear pain and sore throat.    Eyes:  Negative for blurred vision.   Respiratory:  Negative for cough and shortness of breath.    Cardiovascular:  Negative for chest pain and palpitations.   Gastrointestinal:  Negative for abdominal pain and constipation.    Genitourinary:  Negative for dysuria and hematuria.   Musculoskeletal:  Negative for back pain and falls.   Skin:  Negative for itching and rash.   Neurological:  Negative for weakness and headaches.   Endo/Heme/Allergies:  Negative for polydipsia. Does not bruise/bleed easily.   Psychiatric/Behavioral:  Negative for suicidal ideas. The patient does not have insomnia.         Medications:  Outpatient Encounter Medications as of 8/13/2024   Medication Sig Dispense Refill    ACCU-CHEK GUIDE GLUCOSE METER Misc 1 strip by Misc.(Non-Drug; Combo Route) route once daily. 1 each 0    ACCU-CHEK SOFTCLIX LANCETS Misc TEST BLOOD SUGAR ONE TIME DAILY 100 each 3    alcohol swabs (DROPSAFE ALCOHOL PREP PADS) PadM USE TO CLEANSE THE SKIN PRIOR TO DAILY GLUCOSE CHECK 100 each 3    aspirin (ECOTRIN) 81 MG EC tablet Take 81 mg by mouth once daily.      blood sugar diagnostic (ACCU-CHEK GUIDE TEST STRIPS) Strp TEST BLOOD SUGAR EVERY DAY AS DIRECTED 100 strip 3    cholecalciferol, vitamin D3, 125 mcg (5,000 unit) Tab Take 1 tablet (5,000 Units total) by mouth once daily. 90 tablet 1    famotidine (PEPCID) 20 MG tablet TAKE 1 TABLET TWICE DAILY 180 tablet 3    SORAYA BIOTIN ORAL Take 3,000 mcg by mouth 2 (two) times a day.      ondansetron (ZOFRAN) 4 MG tablet Take 1 tablet (4 mg total) by mouth every 6 (six) hours as needed for Nausea. 90 tablet 0    pregabalin (LYRICA) 75 MG capsule Take 75 mg by mouth once daily.      TRULICITY 0.75 mg/0.5 mL pen injector Inject into the skin.      [DISCONTINUED] amLODIPine (NORVASC) 5 MG tablet Take 1 tablet (5 mg total) by mouth once daily. 90 tablet 1    [DISCONTINUED] empagliflozin (JARDIANCE) 25 mg tablet Take 1 tablet (25 mg total) by mouth once daily. 90 tablet 1    [DISCONTINUED] lisinopriL (PRINIVIL,ZESTRIL) 40 MG tablet Take 1 tablet (40 mg total) by mouth once daily. 90 tablet 1    [DISCONTINUED] meloxicam (MOBIC) 15 MG tablet Take 1 tablet (15 mg total) by mouth once daily. 90 tablet 1     [DISCONTINUED] mirtazapine (REMERON) 7.5 MG Tab Take 1 tablet (7.5 mg total) by mouth every evening. 90 tablet 1    [DISCONTINUED] pravastatin (PRAVACHOL) 20 MG tablet Take 1 tablet (20 mg total) by mouth nightly. 90 tablet 1    amLODIPine (NORVASC) 5 MG tablet Take 1 tablet (5 mg total) by mouth once daily. 90 tablet 1    cetirizine (ZYRTEC) 10 MG tablet Take 1 tablet (10 mg total) by mouth once daily. 90 tablet 1    empagliflozin (JARDIANCE) 25 mg tablet Take 1 tablet (25 mg total) by mouth once daily. 90 tablet 1    fluticasone propionate (FLONASE) 50 mcg/actuation nasal spray 2 sprays (100 mcg total) by Each Nostril route once daily. (Patient not taking: Reported on 6/18/2024) 48 g 1    HYDROcodone-acetaminophen (NORCO)  mg per tablet Take 1 tablet by mouth every 6 (six) hours as needed for Pain. (Patient not taking: Reported on 6/18/2024) 20 tablet 0    lisinopriL (PRINIVIL,ZESTRIL) 40 MG tablet Take 1 tablet (40 mg total) by mouth once daily. 90 tablet 1    meloxicam (MOBIC) 15 MG tablet Take 1 tablet (15 mg total) by mouth once daily. 90 tablet 1    metoprolol succinate (TOPROL-XL) 100 MG 24 hr tablet Take 1 tablet (100 mg total) by mouth once daily. (Patient taking differently: Take 50 mg by mouth once daily.) 90 tablet 1    mirtazapine (REMERON) 7.5 MG Tab Take 1 tablet (7.5 mg total) by mouth every evening. 90 tablet 1    omega-3 fatty acids/fish oil (FISH OIL-OMEGA-3 FATTY ACIDS) 300-1,000 mg capsule Take by mouth once daily. (Patient not taking: Reported on 5/13/2024)      pravastatin (PRAVACHOL) 20 MG tablet Take 1 tablet (20 mg total) by mouth nightly. 90 tablet 1    [DISCONTINUED] furosemide (LASIX) 20 MG tablet Take 20 mg by mouth once daily. (Patient not taking: Reported on 8/13/2024)      [DISCONTINUED] glipiZIDE 5 MG TR24 Take 1 tablet (5 mg total) by mouth daily with breakfast. 1-2 tabs PO daily (Patient not taking: Reported on 8/13/2024) 30 tablet 3    [DISCONTINUED] SITagliptin phosphate  "(JANUVIA) 100 MG Tab Take 1 tablet (100 mg total) by mouth once daily. (Patient not taking: Reported on 8/13/2024) 90 tablet 1     No facility-administered encounter medications on file as of 8/13/2024.       Allergies:  Review of patient's allergies indicates:   Allergen Reactions    Janumet [sitagliptin phos-metformin] Diarrhea    Amoxicillin Other (See Comments)    Augmentin [amoxicillin-pot clavulanate] Diarrhea    Clarithromycin Diarrhea    Metformin hcl Diarrhea       Health Maintenance:  Immunization History   Administered Date(s) Administered    COVID-19 MRNA, LN-S PF (MODERNA HALF 0.25 ML DOSE) 06/10/2022    COVID-19, MRNA, LN-S, PF (MODERNA FULL 0.5 ML DOSE) 02/12/2021, 03/16/2021, 11/05/2021    COVID-19, mRNA, LNP-S, bivalent booster, PF (Moderna Omicron)12 + YEARS 01/13/2023    Influenza (FLUAD) - Quadrivalent - Adjuvanted - PF *Preferred* (65+) 10/24/2022    Influenza - High Dose - PF (65 years and older) 10/20/2023    Influenza - Quadrivalent - High Dose - PF (65 years and older) 01/26/2022    Influenza - Trivalent (ADULT) 01/28/2016    Pneumococcal Conjugate - 20 Valent 01/08/2024    Pneumococcal Polysaccharide - 23 Valent 10/24/2022    Zoster Recombinant 01/13/2023, 03/16/2023      Health Maintenance   Topic Date Due    TETANUS VACCINE  Never done    Lipid Panel  09/25/2024    Hemoglobin A1c  10/26/2024    Eye Exam  05/01/2025    DEXA Scan  11/10/2026    Hepatitis C Screening  Completed    Shingles Vaccine  Completed        Physical Exam      Vital Signs  Temp: 97.8 °F (36.6 °C)  Temp Source: Oral  Pulse: 85  SpO2: 97 %  BP: 124/72  BP Location: Left arm  Patient Position: Sitting  Height and Weight  Height: 5' 6" (167.6 cm)  Weight: 73.7 kg (162 lb 7 oz)  BSA (Calculated - sq m): 1.85 sq meters  BMI (Calculated): 26.2  Weight in (lb) to have BMI = 25: 154.6]    Physical Exam  Vitals and nursing note reviewed.   Constitutional:       Appearance: Normal appearance.   HENT:      Head: Normocephalic and " atraumatic.      Nose: Nose normal.   Eyes:      Extraocular Movements: Extraocular movements intact.      Conjunctiva/sclera: Conjunctivae normal.      Pupils: Pupils are equal, round, and reactive to light.   Cardiovascular:      Rate and Rhythm: Normal rate and regular rhythm.      Heart sounds: Normal heart sounds.   Pulmonary:      Effort: Pulmonary effort is normal.      Breath sounds: Normal breath sounds.   Musculoskeletal:      Right lower leg: No edema.      Left lower leg: No edema.   Skin:     Findings: No rash.   Neurological:      General: No focal deficit present.      Mental Status: She is alert and oriented to person, place, and time. Mental status is at baseline.      Cranial Nerves: No cranial nerve deficit.      Gait: Gait normal.   Psychiatric:         Mood and Affect: Mood normal.         Behavior: Behavior normal.         Thought Content: Thought content normal.         Judgment: Judgment normal.          Laboratory:  CBC:  Recent Labs   Lab 02/21/22  0832 06/07/22  0928 09/28/22  0712 06/18/24  1320   WBC 5.31  --  5.57 5.67   RBC 4.00 L  --  4.02 L 4.34   Hemoglobin 11.5 L   < > 11.7 L 11.9 L   Hematocrit 35.4 L   < > 36.0 L 38.6   Platelet Count 186  --  221 182   MCV 88.5  --  89.6 88.9   MCH 28.8  --  29.1 27.4   MCHC 32.5  --  32.5 30.8 L    < > = values in this interval not displayed.     CMP:  Recent Labs   Lab 10/04/23  0903 01/08/24  0912 04/26/24  0803   Glucose 108 H 87 145 H   Calcium 9.2 10.0 9.3   Albumin 3.7 4.0 3.5   Total Protein 7.2 7.5 6.7   Sodium 142 143 142   Potassium 4.8 4.2 4.1   CO2 30 29 28   Chloride 108 H 107 111 H   BUN 16 13 18   Alk Phos 41 L 39 L 50 L   ALT 19 16 22   AST 12 L 14 L 15   Bilirubin, Total 0.4 0.4 0.3     LIPIDS:  Recent Labs   Lab 10/26/21  0920 09/22/22  0715 09/28/22  0712 09/25/23  0946   TSH  --   --  0.587  --    HDL Cholesterol 71 H 73 H  --  75 H   Cholesterol 138 157  --  158   Triglycerides 74 61  --  57   LDL Calculated 52 72  --  72    Cholesterol/HDL Ratio (Risk Factor) 1.9 2.2  --  2.1   Non-HDL 67 84  --  83     TSH:  Recent Labs   Lab 09/28/22  0712   TSH 0.587     A1C:  Recent Labs   Lab 10/26/21  0920 01/26/22  0905 09/21/22  0852 03/22/23  0832 09/25/23  0901 01/08/24  0912 04/26/24  0803   Hemoglobin A1C 6.6 6.4 6.4 6.2 6.4 6.2 7.7 H       Assessment/Plan     Fatmata Tolbert is a 79 y.o.female with:    1. 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  -     empagliflozin (JARDIANCE) 25 mg tablet; Take 1 tablet (25 mg total) by mouth once daily.  Dispense: 90 tablet; Refill: 1    2. Pure hypercholesterolemia  The current medical regimen is effective;  continue present plan and medications.  -     pravastatin (PRAVACHOL) 20 MG tablet; Take 1 tablet (20 mg total) by mouth nightly.  Dispense: 90 tablet; Refill: 1    3. Primary hypertension  The current medical regimen is effective;  continue present plan and medications.  -     amLODIPine (NORVASC) 5 MG tablet; Take 1 tablet (5 mg total) by mouth once daily.  Dispense: 90 tablet; Refill: 1  -     lisinopriL (PRINIVIL,ZESTRIL) 40 MG tablet; Take 1 tablet (40 mg total) by mouth once daily.  Dispense: 90 tablet; Refill: 1    4. Chronic left-sided low back pain with left-sided sciatica  -     meloxicam (MOBIC) 15 MG tablet; Take 1 tablet (15 mg total) by mouth once daily.  Dispense: 90 tablet; Refill: 1    5. Depression, unspecified depression type  -     mirtazapine (REMERON) 7.5 MG Tab; Take 1 tablet (7.5 mg total) by mouth every evening.  Dispense: 90 tablet; Refill: 1    6. Persistent mood (affective) disorder, unspecified        Visit today included increased complexity associated with managing the longitudinal care of the patient due to the serious and/or complex managed problem(s) of  type 2 diabetes,  hypertension, and hyperlipidemia.     Chronic conditions status updated as per HPI.  Other than changes above, cont current medications and maintain follow up with specialists.  Return to clinic in 3 month(s) for medication refills.    Allie Andersen DO  [facility] [place]

## 2024-08-14 ENCOUNTER — CLINICAL SUPPORT (OUTPATIENT)
Dept: FAMILY MEDICINE | Facility: CLINIC | Age: 79
End: 2024-08-14
Payer: MEDICARE

## 2024-08-14 DIAGNOSIS — M25.551 RIGHT HIP PAIN: Primary | ICD-10-CM

## 2024-08-14 PROCEDURE — 96372 THER/PROPH/DIAG INJ SC/IM: CPT | Mod: ,,, | Performed by: FAMILY MEDICINE

## 2024-08-14 RX ORDER — KETOROLAC TROMETHAMINE 30 MG/ML
60 INJECTION, SOLUTION INTRAMUSCULAR; INTRAVENOUS
Status: COMPLETED | OUTPATIENT
Start: 2024-08-14 | End: 2024-08-14

## 2024-08-14 RX ADMIN — KETOROLAC TROMETHAMINE 60 MG: 30 INJECTION, SOLUTION INTRAMUSCULAR; INTRAVENOUS at 11:08

## 2024-11-03 ENCOUNTER — OFFICE VISIT (OUTPATIENT)
Dept: FAMILY MEDICINE | Facility: CLINIC | Age: 79
End: 2024-11-03
Payer: MEDICARE

## 2024-11-03 VITALS
HEIGHT: 66 IN | SYSTOLIC BLOOD PRESSURE: 124 MMHG | HEART RATE: 98 BPM | WEIGHT: 162 LBS | DIASTOLIC BLOOD PRESSURE: 68 MMHG | OXYGEN SATURATION: 98 % | BODY MASS INDEX: 26.03 KG/M2 | RESPIRATION RATE: 18 BRPM | TEMPERATURE: 98 F

## 2024-11-03 DIAGNOSIS — U07.1 COVID-19 VIRUS DETECTED: ICD-10-CM

## 2024-11-03 DIAGNOSIS — R05.9 COUGH, UNSPECIFIED TYPE: ICD-10-CM

## 2024-11-03 DIAGNOSIS — U07.1 COVID-19: Primary | ICD-10-CM

## 2024-11-03 DIAGNOSIS — J22 LOWER RESPIRATORY TRACT INFECTION: ICD-10-CM

## 2024-11-03 PROBLEM — E11.9 DIABETES MELLITUS: Status: ACTIVE | Noted: 2024-10-31

## 2024-11-03 LAB
CTP QC/QA: YES
SARS-COV-2 RDRP RESP QL NAA+PROBE: POSITIVE

## 2024-11-03 PROCEDURE — 87635 SARS-COV-2 COVID-19 AMP PRB: CPT | Mod: QW,,, | Performed by: NURSE PRACTITIONER

## 2024-11-03 PROCEDURE — 3074F SYST BP LT 130 MM HG: CPT | Mod: ,,, | Performed by: NURSE PRACTITIONER

## 2024-11-03 PROCEDURE — 3288F FALL RISK ASSESSMENT DOCD: CPT | Mod: ,,, | Performed by: NURSE PRACTITIONER

## 2024-11-03 PROCEDURE — 99213 OFFICE O/P EST LOW 20 MIN: CPT | Mod: 25,,, | Performed by: NURSE PRACTITIONER

## 2024-11-03 PROCEDURE — 1101F PT FALLS ASSESS-DOCD LE1/YR: CPT | Mod: ,,, | Performed by: NURSE PRACTITIONER

## 2024-11-03 PROCEDURE — 1126F AMNT PAIN NOTED NONE PRSNT: CPT | Mod: ,,, | Performed by: NURSE PRACTITIONER

## 2024-11-03 PROCEDURE — 96372 THER/PROPH/DIAG INJ SC/IM: CPT | Mod: ,,, | Performed by: NURSE PRACTITIONER

## 2024-11-03 PROCEDURE — 1159F MED LIST DOCD IN RCRD: CPT | Mod: ,,, | Performed by: NURSE PRACTITIONER

## 2024-11-03 PROCEDURE — 3078F DIAST BP <80 MM HG: CPT | Mod: ,,, | Performed by: NURSE PRACTITIONER

## 2024-11-03 RX ORDER — DOXYCYCLINE HYCLATE 100 MG
100 TABLET ORAL 2 TIMES DAILY
Qty: 20 TABLET | Refills: 0 | Status: SHIPPED | OUTPATIENT
Start: 2024-11-03 | End: 2024-11-13

## 2024-11-03 RX ORDER — GUAIFENESIN 100 MG/5ML
200 SOLUTION ORAL 3 TIMES DAILY PRN
Qty: 236 ML | Refills: 0 | Status: SHIPPED | OUTPATIENT
Start: 2024-11-03 | End: 2024-11-13

## 2024-11-03 RX ORDER — LINCOMYCIN HYDROCHLORIDE 300 MG/ML
300 INJECTION, SOLUTION INTRAMUSCULAR; INTRAVENOUS; SUBCONJUNCTIVAL
Status: COMPLETED | OUTPATIENT
Start: 2024-11-03 | End: 2024-11-03

## 2024-11-03 RX ORDER — DEXAMETHASONE SODIUM PHOSPHATE 4 MG/ML
4 INJECTION, SOLUTION INTRA-ARTICULAR; INTRALESIONAL; INTRAMUSCULAR; INTRAVENOUS; SOFT TISSUE
Status: COMPLETED | OUTPATIENT
Start: 2024-11-03 | End: 2024-11-03

## 2024-11-03 RX ADMIN — DEXAMETHASONE SODIUM PHOSPHATE 4 MG: 4 INJECTION, SOLUTION INTRA-ARTICULAR; INTRALESIONAL; INTRAMUSCULAR; INTRAVENOUS; SOFT TISSUE at 09:11

## 2024-11-03 RX ADMIN — LINCOMYCIN HYDROCHLORIDE 300 MG: 300 INJECTION, SOLUTION INTRAMUSCULAR; INTRAVENOUS; SUBCONJUNCTIVAL at 09:11

## 2024-11-03 NOTE — PROGRESS NOTES
Subjective:       Patient ID: Fatmata Tolbert is a 79 y.o. female.    Chief Complaint: Chest Congestion, Nasal Congestion, Fever (Pt. States symptoms started 11/1/24), and Generalized Body Aches    Chest Congestion, Nasal Congestion, Fever (Pt. States symptoms started 11/1/24), and Generalized Body Aches      Fever   Associated symptoms include congestion and coughing. Pertinent negatives include no abdominal pain, chest pain, ear pain, headaches, nausea, rash, sore throat or vomiting. Her past medical history is not significant for immunocompromised state.     Review of Systems   Constitutional:  Positive for fever. Negative for appetite change and fatigue.   HENT:  Positive for nasal congestion and voice change (hoarseness). Negative for ear pain and sore throat.    Eyes:  Negative for pain, discharge and itching.   Respiratory:  Positive for cough and chest tightness. Negative for shortness of breath.    Cardiovascular:  Negative for chest pain and leg swelling.   Gastrointestinal:  Negative for abdominal pain, change in bowel habit, nausea and vomiting.   Musculoskeletal:  Negative for back pain, gait problem and neck pain.   Integumentary:  Negative for rash and wound.   Allergic/Immunologic: Negative for immunocompromised state.   Neurological:  Negative for dizziness, weakness and headaches.   All other systems reviewed and are negative.        Objective:      Physical Exam  Vitals and nursing note reviewed.   Constitutional:       General: She is not in acute distress.     Appearance: Normal appearance. She is not ill-appearing, toxic-appearing or diaphoretic.   HENT:      Head: Normocephalic.      Right Ear: Tympanic membrane, ear canal and external ear normal.      Left Ear: Tympanic membrane, ear canal and external ear normal.      Nose: Mucosal edema, congestion and rhinorrhea present.      Right Turbinates: Swollen.      Left Turbinates: Swollen.      Mouth/Throat:      Mouth: Mucous membranes are moist.       Pharynx: No oropharyngeal exudate or posterior oropharyngeal erythema.   Eyes:      General: No scleral icterus.        Right eye: No discharge.         Left eye: No discharge.      Extraocular Movements: Extraocular movements intact.      Conjunctiva/sclera: Conjunctivae normal.      Pupils: Pupils are equal, round, and reactive to light.   Cardiovascular:      Rate and Rhythm: Normal rate and regular rhythm.      Pulses: Normal pulses.      Heart sounds: Normal heart sounds. No murmur heard.  Pulmonary:      Effort: Pulmonary effort is normal. No respiratory distress.      Breath sounds: No wheezing, rhonchi or rales.      Comments: Coarse breath sounds  Musculoskeletal:         General: Normal range of motion.      Cervical back: Neck supple. No tenderness.   Lymphadenopathy:      Cervical: No cervical adenopathy.   Skin:     General: Skin is warm and dry.      Capillary Refill: Capillary refill takes less than 2 seconds.      Findings: No rash.   Neurological:      Mental Status: She is alert and oriented to person, place, and time.   Psychiatric:         Mood and Affect: Mood normal.         Behavior: Behavior normal.         Thought Content: Thought content normal.         Judgment: Judgment normal.            Assessment:       1. COVID-19    2. Cough, unspecified type    3. Lower respiratory tract infection        Plan:   COVID-19    Cough, unspecified type  -     POCT COVID-19 Rapid Screening  -     guaiFENesin 100 mg/5 ml (ROBITUSSIN) 100 mg/5 mL syrup; Take 10 mLs (200 mg total) by mouth 3 (three) times daily as needed for Cough.  Dispense: 236 mL; Refill: 0    Lower respiratory tract infection  -     lincomycin injection 300 mg  -     dexAMETHasone injection 4 mg  -     guaiFENesin 100 mg/5 ml (ROBITUSSIN) 100 mg/5 mL syrup; Take 10 mLs (200 mg total) by mouth 3 (three) times daily as needed for Cough.  Dispense: 236 mL; Refill: 0  -     doxycycline (VIBRA-TABS) 100 MG tablet; Take 1 tablet (100 mg  total) by mouth 2 (two) times daily. for 10 days  Dispense: 20 tablet; Refill: 0           Risks, benefits, and side effects were discussed with the patient. All questions were answered to the fullest satisfaction of the patient, and pt verbalized understanding and agreement to treatment plan. Pt was to call with any new or worsening symptoms, or present to the ER

## 2024-11-06 ENCOUNTER — NURSE TRIAGE (OUTPATIENT)
Dept: ADMINISTRATIVE | Facility: CLINIC | Age: 79
End: 2024-11-06

## 2024-11-06 NOTE — TELEPHONE ENCOUNTER
Pt calling with c/o feeling very weak. Pt was dx with covid on 11/3 and she said that she is feeling somewhat better as for as stomach ache. She is eating and drinking well but thought that she would be feeling better. She said today is better than yesterday as for as the pain but feels so weak. Pt triaged and care advice to ED or PCP triage. Pt sent to [facility] and he said that she sounds like progressing well and to add Pedialyte and to rest and take it easy. Pt told his recommendations and to call us back with any other questions or concerns                    Reason for Disposition   Patient sounds very sick or weak to the triager    Additional Information   Negative: SEVERE difficulty breathing (e.g., struggling for each breath, speaks in single words)   Negative: Shock suspected (e.g., cold/pale/clammy skin, too weak to stand, low BP, rapid pulse)   Negative: Difficult to awaken or acting confused (e.g., disoriented, slurred speech)   Negative: Fainted > 15 minutes ago and still feels too weak or dizzy to stand   Negative: SEVERE weakness (e.g., unable to walk or barely able to walk, requires support) and new-onset or getting worse   Negative: Sounds like a life-threatening emergency to the triager   Negative: Difficulty breathing   Negative: Heart beating < 50 beats per minute OR > 140 beats per minute   Negative: Extra heartbeats, irregular heart beating, or heart is beating very fast (i.e., 'palpitations')   Negative: Follows large amount of bleeding (e.g., from vomiting, rectum, vagina) (Exception: Small transient weakness from sight of a small amount blood.)   Negative: Black or tarry bowel movements   Negative: MODERATE weakness or fatigue from poor fluid intake with no improvement after 2 hours of rest and fluids   Negative: Drinking very little and dehydration suspected (e.g., no urine > 12 hours, very dry mouth, very lightheaded)    Protocols used: Weakness (Generalized) and Fatigue-A-OH

## 2024-11-06 NOTE — TELEPHONE ENCOUNTER
[facility] Emergency Department Plan of Care Note    Referral source: [facility]    Discussed patient with [facility].        Disposition recommended:  Patient's COVID infection is progressing appropriately.  Encouraged the patient to consume fluids and get plenty of rest and follow up with the primary health care provider.

## 2024-11-12 ENCOUNTER — OFFICE VISIT (OUTPATIENT)
Dept: FAMILY MEDICINE | Facility: CLINIC | Age: 79
End: 2024-11-12
Payer: MEDICARE

## 2024-11-12 VITALS
WEIGHT: 156 LBS | TEMPERATURE: 99 F | DIASTOLIC BLOOD PRESSURE: 71 MMHG | HEIGHT: 66 IN | HEART RATE: 69 BPM | SYSTOLIC BLOOD PRESSURE: 128 MMHG | OXYGEN SATURATION: 98 % | BODY MASS INDEX: 25.07 KG/M2

## 2024-11-12 DIAGNOSIS — M54.42 CHRONIC LEFT-SIDED LOW BACK PAIN WITH LEFT-SIDED SCIATICA: ICD-10-CM

## 2024-11-12 DIAGNOSIS — F32.A DEPRESSION, UNSPECIFIED DEPRESSION TYPE: ICD-10-CM

## 2024-11-12 DIAGNOSIS — R11.0 NAUSEA: ICD-10-CM

## 2024-11-12 DIAGNOSIS — G89.29 CHRONIC LEFT-SIDED LOW BACK PAIN WITH LEFT-SIDED SCIATICA: ICD-10-CM

## 2024-11-12 DIAGNOSIS — I10 PRIMARY HYPERTENSION: ICD-10-CM

## 2024-11-12 DIAGNOSIS — E78.00 PURE HYPERCHOLESTEROLEMIA: ICD-10-CM

## 2024-11-12 DIAGNOSIS — J30.1 NON-SEASONAL ALLERGIC RHINITIS DUE TO POLLEN: ICD-10-CM

## 2024-11-12 DIAGNOSIS — R05.2 SUBACUTE COUGH: Primary | ICD-10-CM

## 2024-11-12 DIAGNOSIS — U07.1 COVID-19: ICD-10-CM

## 2024-11-12 DIAGNOSIS — E11.3599 TYPE 2 DIABETES MELLITUS WITH PROLIFERATIVE RETINOPATHY WITHOUT MACULAR EDEMA, WITHOUT LONG-TERM CURRENT USE OF INSULIN, UNSPECIFIED LATERALITY: ICD-10-CM

## 2024-11-12 LAB
ALBUMIN SERPL BCP-MCNC: 3.7 G/DL (ref 3.5–5)
ALBUMIN/GLOB SERPL: 1.1 {RATIO}
ALP SERPL-CCNC: 58 U/L (ref 55–142)
ALT SERPL W P-5'-P-CCNC: 28 U/L (ref 13–56)
ANION GAP SERPL CALCULATED.3IONS-SCNC: 7 MMOL/L (ref 7–16)
AST SERPL W P-5'-P-CCNC: 17 U/L (ref 15–37)
BASOPHILS # BLD AUTO: 0.02 K/UL (ref 0–0.2)
BASOPHILS NFR BLD AUTO: 0.4 % (ref 0–1)
BILIRUB SERPL-MCNC: 0.2 MG/DL (ref ?–1.2)
BUN SERPL-MCNC: 21 MG/DL (ref 7–18)
BUN/CREAT SERPL: 17 (ref 6–20)
CALCIUM SERPL-MCNC: 10.1 MG/DL (ref 8.5–10.1)
CHLORIDE SERPL-SCNC: 106 MMOL/L (ref 98–107)
CO2 SERPL-SCNC: 32 MMOL/L (ref 21–32)
CREAT SERPL-MCNC: 1.24 MG/DL (ref 0.55–1.02)
DIFFERENTIAL METHOD BLD: ABNORMAL
EGFR (NO RACE VARIABLE) (RUSH/TITUS): 45 ML/MIN/1.73M2
EOSINOPHIL # BLD AUTO: 0.12 K/UL (ref 0–0.5)
EOSINOPHIL NFR BLD AUTO: 2.3 % (ref 1–4)
ERYTHROCYTE [DISTWIDTH] IN BLOOD BY AUTOMATED COUNT: 13.4 % (ref 11.5–14.5)
GLOBULIN SER-MCNC: 3.3 G/DL (ref 2–4)
GLUCOSE SERPL-MCNC: 194 MG/DL (ref 74–106)
HCT VFR BLD AUTO: 35.7 % (ref 38–47)
HGB BLD-MCNC: 10.8 G/DL (ref 12–16)
IMM GRANULOCYTES # BLD AUTO: 0.03 K/UL (ref 0–0.04)
IMM GRANULOCYTES NFR BLD: 0.6 % (ref 0–0.4)
LYMPHOCYTES # BLD AUTO: 1.97 K/UL (ref 1–4.8)
LYMPHOCYTES NFR BLD AUTO: 38.6 % (ref 27–41)
MCH RBC QN AUTO: 27.6 PG (ref 27–31)
MCHC RBC AUTO-ENTMCNC: 30.3 G/DL (ref 32–36)
MCV RBC AUTO: 91.1 FL (ref 80–96)
MONOCYTES # BLD AUTO: 0.44 K/UL (ref 0–0.8)
MONOCYTES NFR BLD AUTO: 8.6 % (ref 2–6)
MPC BLD CALC-MCNC: 11.6 FL (ref 9.4–12.4)
NEUTROPHILS # BLD AUTO: 2.53 K/UL (ref 1.8–7.7)
NEUTROPHILS NFR BLD AUTO: 49.5 % (ref 53–65)
NRBC # BLD AUTO: 0 X10E3/UL
NRBC, AUTO (.00): 0 %
PLATELET # BLD AUTO: 244 K/UL (ref 150–400)
POTASSIUM SERPL-SCNC: 4.6 MMOL/L (ref 3.5–5.1)
PROT SERPL-MCNC: 7 G/DL (ref 6.4–8.2)
RBC # BLD AUTO: 3.92 M/UL (ref 4.2–5.4)
SODIUM SERPL-SCNC: 140 MMOL/L (ref 136–145)
WBC # BLD AUTO: 5.11 K/UL (ref 4.5–11)

## 2024-11-12 PROCEDURE — 3074F SYST BP LT 130 MM HG: CPT | Mod: ,,,

## 2024-11-12 PROCEDURE — 1159F MED LIST DOCD IN RCRD: CPT | Mod: ,,,

## 2024-11-12 PROCEDURE — 80053 COMPREHEN METABOLIC PANEL: CPT | Mod: ,,, | Performed by: CLINICAL MEDICAL LABORATORY

## 2024-11-12 PROCEDURE — 3078F DIAST BP <80 MM HG: CPT | Mod: ,,,

## 2024-11-12 PROCEDURE — 99214 OFFICE O/P EST MOD 30 MIN: CPT | Mod: ,,,

## 2024-11-12 PROCEDURE — 1160F RVW MEDS BY RX/DR IN RCRD: CPT | Mod: ,,,

## 2024-11-12 PROCEDURE — 1101F PT FALLS ASSESS-DOCD LE1/YR: CPT | Mod: ,,,

## 2024-11-12 PROCEDURE — 85025 COMPLETE CBC W/AUTO DIFF WBC: CPT | Mod: ,,, | Performed by: CLINICAL MEDICAL LABORATORY

## 2024-11-12 PROCEDURE — 3288F FALL RISK ASSESSMENT DOCD: CPT | Mod: ,,,

## 2024-11-12 RX ORDER — GUAIFENESIN 600 MG/1
1200 TABLET, EXTENDED RELEASE ORAL 2 TIMES DAILY
Qty: 40 TABLET | Refills: 0 | Status: SHIPPED | OUTPATIENT
Start: 2024-11-12

## 2024-11-12 RX ORDER — MELOXICAM 15 MG/1
15 TABLET ORAL DAILY
Qty: 90 TABLET | Refills: 1 | Status: SHIPPED | OUTPATIENT
Start: 2024-11-12

## 2024-11-12 RX ORDER — MIRTAZAPINE 7.5 MG/1
7.5 TABLET, FILM COATED ORAL NIGHTLY
Qty: 90 TABLET | Refills: 1 | Status: SHIPPED | OUTPATIENT
Start: 2024-11-12

## 2024-11-12 RX ORDER — PRAVASTATIN SODIUM 20 MG/1
20 TABLET ORAL NIGHTLY
Qty: 90 TABLET | Refills: 1 | Status: SHIPPED | OUTPATIENT
Start: 2024-11-12

## 2024-11-12 RX ORDER — LISINOPRIL 40 MG/1
40 TABLET ORAL DAILY
Qty: 90 TABLET | Refills: 1 | Status: SHIPPED | OUTPATIENT
Start: 2024-11-12 | End: 2026-12-09

## 2024-11-12 RX ORDER — AMLODIPINE BESYLATE 5 MG/1
5 TABLET ORAL DAILY
Qty: 90 TABLET | Refills: 1 | Status: SHIPPED | OUTPATIENT
Start: 2024-11-12 | End: 2025-11-12

## 2024-11-12 RX ORDER — BENZONATATE 100 MG/1
100 CAPSULE ORAL 3 TIMES DAILY PRN
Qty: 30 CAPSULE | Refills: 0 | Status: SHIPPED | OUTPATIENT
Start: 2024-11-12 | End: 2024-11-22

## 2024-11-12 RX ORDER — ONDANSETRON 4 MG/1
4 TABLET, FILM COATED ORAL EVERY 6 HOURS PRN
Qty: 90 TABLET | Refills: 0 | Status: SHIPPED | OUTPATIENT
Start: 2024-11-12

## 2024-11-12 NOTE — PATIENT INSTRUCTIONS
and start doxycycline that was prescribed by urgent care on 11/3.   Take tessalon perles for cough. Take mucinex pills instead of robitussin.

## 2024-11-12 NOTE — PROGRESS NOTES
Subjective     Patient ID: Fatmata Tolbert is a 79 y.o. female.    Chief Complaint: Follow-up (3 month)    Presents to clinic for worsening cough. Dx'd covid 11/3/24. Cough syrup elevated blood sugar so she stopped that. Has been taking mucinex otc without relief. Did not  and start doxycycline that was prescribed at 11/3 visit.     Follow-up  Associated symptoms include chills (at night) and coughing (productive yellow sputum.). Pertinent negatives include no abdominal pain, congestion, fatigue, fever, headaches, myalgias, nausea, sore throat, vomiting or weakness.     Review of Systems   Constitutional:  Positive for chills (at night). Negative for fatigue and fever.   HENT:  Positive for sinus pressure/congestion. Negative for nasal congestion, ear discharge, ear pain, facial swelling, rhinorrhea, sneezing and sore throat.    Eyes:  Negative for visual disturbance.   Respiratory:  Positive for cough (productive yellow sputum.). Negative for chest tightness, shortness of breath and wheezing.    Gastrointestinal:  Negative for abdominal pain, nausea and vomiting.   Genitourinary:  Negative for decreased urine volume.   Musculoskeletal:  Negative for myalgias.   Neurological:  Negative for weakness and headaches.   Hematological:  Negative for adenopathy.          Objective     Physical Exam  Vitals and nursing note reviewed.   Constitutional:       Appearance: Normal appearance. She is normal weight.   Cardiovascular:      Rate and Rhythm: Normal rate and regular rhythm.      Pulses: Normal pulses.      Heart sounds: Normal heart sounds.   Pulmonary:      Effort: Pulmonary effort is normal. No respiratory distress.      Breath sounds: Normal breath sounds. No stridor. No wheezing, rhonchi or rales.   Chest:      Chest wall: No tenderness.   Musculoskeletal:         General: Normal range of motion.   Skin:     General: Skin is warm and dry.      Capillary Refill: Capillary refill takes less than 2 seconds.    Neurological:      General: No focal deficit present.      Mental Status: She is alert and oriented to person, place, and time.   Psychiatric:         Mood and Affect: Mood normal.         Behavior: Behavior normal.         Thought Content: Thought content normal.         Judgment: Judgment normal.            Assessment and Plan     1. Subacute cough  -     X-Ray Chest PA And Lateral; Future; Expected date: 11/12/2024    2. Primary hypertension  -     amLODIPine (NORVASC) 5 MG tablet; Take 1 tablet (5 mg total) by mouth once daily.  Dispense: 90 tablet; Refill: 1  -     lisinopriL (PRINIVIL,ZESTRIL) 40 MG tablet; Take 1 tablet (40 mg total) by mouth once daily.  Dispense: 90 tablet; Refill: 1    3. Non-seasonal allergic rhinitis due to pollen    4. 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  Dispense: 90 tablet; Refill: 1    5. Chronic left-sided low back pain with left-sided sciatica  -     meloxicam (MOBIC) 15 MG tablet; Take 1 tablet (15 mg total) by mouth once daily.  Dispense: 90 tablet; Refill: 1    6. Depression, unspecified depression type  -     mirtazapine (REMERON) 7.5 MG Tab; Take 1 tablet (7.5 mg total) by mouth every evening.  Dispense: 90 tablet; Refill: 1    7. Nausea  -     ondansetron (ZOFRAN) 4 MG tablet; Take 1 tablet (4 mg total) by mouth every 6 (six) hours as needed for Nausea.  Dispense: 90 tablet; Refill: 0    8. Pure hypercholesterolemia  -     pravastatin (PRAVACHOL) 20 MG tablet; Take 1 tablet (20 mg total) by mouth nightly.  Dispense: 90 tablet; Refill: 1        Meds refilled   and take doxy as previously prescribed.     Use saline nose sprays  Cool mist humidifier with distilled water  Warm salt water gargles  Warm tea with honey and lemon  Chloraseptic spray OTC.   Tylenol/ibuprofen  for pain/fever greater than 100.4  Increase water intake.            Follow up in about 3 months (around 2/12/2025), or if symptoms worsen or fail to improve.    I spent a total of 15 minutes on the day of the visit.This includes face to face time and non-face to face time preparing to see the patient (eg, review of tests), obtaining and/or reviewing separately obtained history, documenting clinical information in the electronic or other health record, independently interpreting results and communicating results to the patient/family/caregiver, or care coordinator.    KAYY FineC

## 2024-11-13 DIAGNOSIS — R94.4 DECREASED GFR: Primary | ICD-10-CM

## 2024-11-14 ENCOUNTER — TELEPHONE (OUTPATIENT)
Dept: FAMILY MEDICINE | Facility: CLINIC | Age: 79
End: 2024-11-14
Payer: MEDICARE

## 2024-11-14 ENCOUNTER — TELEPHONE (OUTPATIENT)
Dept: NEPHROLOGY | Facility: CLINIC | Age: 79
End: 2024-11-14
Payer: MEDICARE

## 2024-11-14 NOTE — TELEPHONE ENCOUNTER
----- Message from NAVNEET Fine sent at 11/13/2024  1:50 PM CST -----  Continue current medications. Be sure to drink plenty of water. I have placed referral to nephrology for evaluation

## 2024-11-14 NOTE — TELEPHONE ENCOUNTER
----- Message from NAVNEET Fine sent at 11/13/2024  1:50 PM CST -----  Borderline cardiomegaly without evidence of failure. No signs of pneumonia.

## 2024-11-14 NOTE — TELEPHONE ENCOUNTER
----- Message from Tech Laturina sent at 11/13/2024  4:17 PM CST -----  Who Called: Fatmata Tolbert    Patient is returning phone call    Who Left Message for Patient: Fabian  Does the patient know what this is regarding?:N/A      Preferred Method of Contact: Phone Call  Patient's Preferred Phone Number on File: [phone]   Best Call Back Number, if different:[phone]  Additional Information: patient returned call see phone encounter

## 2024-11-21 ENCOUNTER — OFFICE VISIT (OUTPATIENT)
Dept: NEPHROLOGY | Facility: CLINIC | Age: 79
End: 2024-11-21
Payer: MEDICARE

## 2024-11-21 VITALS
OXYGEN SATURATION: 99 % | HEIGHT: 66 IN | DIASTOLIC BLOOD PRESSURE: 68 MMHG | SYSTOLIC BLOOD PRESSURE: 122 MMHG | HEART RATE: 61 BPM | BODY MASS INDEX: 25.81 KG/M2 | RESPIRATION RATE: 18 BRPM | WEIGHT: 160.63 LBS

## 2024-11-21 DIAGNOSIS — E11.21 DIABETIC NEPHROPATHY ASSOCIATED WITH TYPE 2 DIABETES MELLITUS: ICD-10-CM

## 2024-11-21 DIAGNOSIS — R94.4 DECREASED GFR: ICD-10-CM

## 2024-11-21 DIAGNOSIS — I12.9 HYPERTENSIVE NEPHROSCLEROSIS, STAGE 1 THROUGH STAGE 4 OR UNSPECIFIED CHRONIC KIDNEY DISEASE: ICD-10-CM

## 2024-11-21 DIAGNOSIS — N18.2 CKD (CHRONIC KIDNEY DISEASE) STAGE 2, GFR 60-89 ML/MIN: Primary | ICD-10-CM

## 2024-11-21 PROCEDURE — 99215 OFFICE O/P EST HI 40 MIN: CPT | Mod: PBBFAC | Performed by: NURSE PRACTITIONER

## 2024-11-21 PROCEDURE — 99999 PR PBB SHADOW E&M-EST. PATIENT-LVL V: CPT | Mod: PBBFAC,,, | Performed by: NURSE PRACTITIONER

## 2024-11-21 PROCEDURE — 1159F MED LIST DOCD IN RCRD: CPT | Mod: CPTII,,, | Performed by: NURSE PRACTITIONER

## 2024-11-21 PROCEDURE — 3074F SYST BP LT 130 MM HG: CPT | Mod: CPTII,,, | Performed by: NURSE PRACTITIONER

## 2024-11-21 PROCEDURE — 3078F DIAST BP <80 MM HG: CPT | Mod: CPTII,,, | Performed by: NURSE PRACTITIONER

## 2024-11-21 PROCEDURE — 99203 OFFICE O/P NEW LOW 30 MIN: CPT | Mod: S$PBB,,, | Performed by: NURSE PRACTITIONER

## 2024-11-21 PROCEDURE — 3288F FALL RISK ASSESSMENT DOCD: CPT | Mod: CPTII,,, | Performed by: NURSE PRACTITIONER

## 2024-11-21 PROCEDURE — 1126F AMNT PAIN NOTED NONE PRSNT: CPT | Mod: CPTII,,, | Performed by: NURSE PRACTITIONER

## 2024-11-21 PROCEDURE — 1101F PT FALLS ASSESS-DOCD LE1/YR: CPT | Mod: CPTII,,, | Performed by: NURSE PRACTITIONER

## 2024-11-21 RX ORDER — DOXYCYCLINE HYCLATE 100 MG
100 TABLET ORAL 2 TIMES DAILY
COMMUNITY
Start: 2024-11-12

## 2024-11-21 RX ORDER — CYCLOBENZAPRINE HCL 10 MG
TABLET ORAL
COMMUNITY
Start: 2024-10-09

## 2024-11-21 RX ORDER — NEBIVOLOL 10 MG/1
TABLET ORAL
COMMUNITY
Start: 2024-10-16

## 2024-11-21 NOTE — PROGRESS NOTES
[facility] History and Physical  Patient Name: Fatmata Tolbert  MRN: [id number]  Age: 79 y.o.  : 1945    Date: 2024 8:43 AM    Chief Complaint: Establish Care    HPI :   Ms Tolbert presents to Nephrology clinic to establish care. She is followed by Dr. Allie Andersen as her primary care provider.     HTN:  Current regimen includes Amlodipine 5 mg daily, Lisinopril 40 mg daily, Toprol- mg daily, bystolic 10 g daily    DM2: Current regimen includes Jardiance 25 mg daily, Trulicity. Last A1c 8.2%     Nephrology history: No FH of kidney disease, no nephrolithiasis, or recurrent UTIs. Rx on med list for Meloxicam.     Patient denies any CP, SOB, peripheral edema, dysuria, hematuria, changes in urinary habits, or increased frequency of urination.     Past Medical History:  has a past medical history of Acute superficial gastritis without hemorrhage (2022), Anxiety, Cardiomyopathy, Diabetes, polyneuropathy, DJD (degenerative joint disease), multiple sites, Duodenitis (2022), GERD (gastroesophageal reflux disease), HH (hiatus hernia) (2022), HTN (hypertension), Hyperlipidemia, ANNA (obstructive sleep apnea), and Pseudophakia (2021).     Past Surgical History:   has a past surgical history that includes Breast biopsy; Hysterectomy; Total abdominal hysterectomy w/ bilateral salpingoophorectomy; Cardiac electrophysiology study and ablation; and heart catherization .     Family History:  family history includes Breast cancer in her maternal grandmother and sister; Diabetes in her mother; Heart disease in her father and sister; Hypertension in her father and sister; Kidney disease in her brother; Prostate cancer in her father; Stroke in her brother, brother, mother, and sister. No family history of kidney disease.     Social History:   reports that she has never smoked. She has never been exposed to tobacco smoke. She has never used  smokeless tobacco. She reports that she does not drink alcohol and does not use drugs.     Allergies: is allergic to janumet [sitagliptin phos-metformin], amoxicillin, augmentin [amoxicillin-pot clavulanate], clarithromycin, and metformin hcl.     Medications: Reviewed including OTC medications, herbal supplements, and NSAIDS.     Old records have been reviewed.      Review of Systems:  ROS: A 10 point ROS was completed and found to be negative except for that mentioned above.          Physical Exam:  Vitals:    11/21/24 0804   BP: 122/68   Pulse: 61   Resp: 18       Constitutional: sitting in chair, in NAD  Eyes: EOMI, white sclera  ENMT: moist mucus membranes, nares patent  Cardiovascular: normal rate, S1/S2 noted, no edema  Respiratory: symmetrical chest expansion, CTA-B  Gastrointestinal: +BS, soft, NT/ND  Musculoskeletal: normal, no joint erythema/effusions  Skin: no rash, no purpura, warm extremities  Neurological: Alert and Oriented x 3, afocal    Labs:   Lab Results   Component Value Date     11/12/2024    K 4.6 11/12/2024    CREATININE 1.24 (H) 11/12/2024    ALT 28 11/12/2024    AST 17 11/12/2024    HGBA1C 7.7 (H) 04/26/2024    LDLCALC 72 09/25/2023    CHOL 158 09/25/2023    HDL 75 (H) 09/25/2023    TRIG 57 09/25/2023    TSH 0.587 09/28/2022    WBC 5.11 11/12/2024    HGB 10.8 (L) 11/12/2024    HCT 35.7 (L) 11/12/2024     11/12/2024        Otherwise Reviewed    Assessment/Plan:       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 Repeat labs in 3 weeks. Recently switched diabetic medications after A1c jumped up to 8, but states that this unsual as she keeps tight control of her glucoses. Counseled to avoid nephrotoxic agents such as NSAIDs.     Proteinuria: urine Prot:Creat ratio is pending. Patient is on RAAS blockade with ACEi.     Anemia: CBC pending    BMD: Calcium and Phosphorus PTH, Vit D pending    HTN: well controlled with current meds     DM type 2: on  ACEi, on SGTL2i; on goal directed therapy agree with both.     RTC 6 months with CBC, RFP, UA, urine for Prot:creat ratio, PTH, Vit D      Signature:             MELI Loaiza  [facility]  [address] [facility] [place] [address]  [phone]        30 minutes of total time spent on the encounter, which includes face to face time and non-face to face time preparing to see the patient (eg, review of tests), Obtaining and/or reviewing separately obtained history, Documenting clinical information in the electronic or other health record, Independently interpreting results (not separately reported) and communicating results to the patient/family/caregiver, or Care coordination (not separately reported).       Date of encounter: 11/21/24

## 2024-12-17 ENCOUNTER — TELEPHONE (OUTPATIENT)
Dept: NEPHROLOGY | Facility: CLINIC | Age: 79
End: 2024-12-17
Payer: MEDICARE

## 2024-12-17 DIAGNOSIS — N18.2 CKD (CHRONIC KIDNEY DISEASE) STAGE 2, GFR 60-89 ML/MIN: Primary | ICD-10-CM

## 2024-12-17 NOTE — TELEPHONE ENCOUNTER
----- Message from MELI Ferrari sent at 12/17/2024  8:27 AM CST -----  Please let patient know that kidney function improved just slightly. She needs to be sure that she is hydrating well with water and avoiding all NSAIDs including Meloxicam and keeping good control of her diabetes. I would like to set her up for kidney u/s. Thank you!

## 2025-01-07 ENCOUNTER — HOSPITAL ENCOUNTER (OUTPATIENT)
Dept: RADIOLOGY | Facility: HOSPITAL | Age: 80
Discharge: HOME | End: 2025-01-07
Attending: NURSE PRACTITIONER
Payer: MEDICARE

## 2025-01-07 DIAGNOSIS — N18.2 CKD (CHRONIC KIDNEY DISEASE) STAGE 2, GFR 60-89 ML/MIN: ICD-10-CM

## 2025-01-07 PROCEDURE — 76775 US EXAM ABDO BACK WALL LIM: CPT | Mod: TC

## 2025-01-08 ENCOUNTER — TELEPHONE (OUTPATIENT)
Dept: NEPHROLOGY | Facility: CLINIC | Age: 80
End: 2025-01-08
Payer: MEDICARE

## 2025-01-08 DIAGNOSIS — N20.0 KIDNEY STONES: Primary | ICD-10-CM

## 2025-01-08 NOTE — TELEPHONE ENCOUNTER
----- Message from MELI Ferrari sent at 1/8/2025 12:12 PM CST -----  Please let Ms. Tolbert know that she does have a kidney stone in the right kidney, it is non obstructive right now. We can refer her to Urology if she would like us to so they can monitor kidney stone. Thank you!

## 2025-01-08 NOTE — TELEPHONE ENCOUNTER
----- Message from Donna sent at 1/8/2025  1:15 PM CST -----  Regarding: Kai back  Who Called: Fatmata Tolbert    Patient is returning phone call    Who Left Message for Patient : Barrington Minor LPN  Does the patient know what this is regarding?: Imaging completed on 01/07/2025      Preferred Method of Contact: Phone Call  Patient's Preferred Phone Number on File: [phone]   Best Call Back Number, if different:  Additional Information: Patient states that if she does not answer to please leave a detailed message on her VM

## 2025-02-28 ENCOUNTER — HOSPITAL ENCOUNTER (EMERGENCY)
Facility: HOSPITAL | Age: 80
Discharge: HOME | End: 2025-03-01
Attending: FAMILY MEDICINE
Payer: MEDICARE

## 2025-02-28 VITALS
HEART RATE: 74 BPM | RESPIRATION RATE: 18 BRPM | OXYGEN SATURATION: 99 % | HEIGHT: 66 IN | WEIGHT: 160 LBS | DIASTOLIC BLOOD PRESSURE: 55 MMHG | SYSTOLIC BLOOD PRESSURE: 135 MMHG | BODY MASS INDEX: 25.71 KG/M2 | TEMPERATURE: 98 F

## 2025-02-28 DIAGNOSIS — K62.5 RECTAL BLEEDING: Primary | ICD-10-CM

## 2025-02-28 LAB
ALBUMIN SERPL BCP-MCNC: 4 G/DL (ref 3.4–4.8)
ALBUMIN/GLOB SERPL: 1.2 {RATIO}
ALP SERPL-CCNC: 55 U/L (ref 40–150)
ALT SERPL W P-5'-P-CCNC: 22 U/L
ANION GAP SERPL CALCULATED.3IONS-SCNC: 16 MMOL/L (ref 7–16)
AST SERPL W P-5'-P-CCNC: 24 U/L (ref 5–34)
BASOPHILS # BLD AUTO: 0.02 K/UL (ref 0–0.2)
BASOPHILS NFR BLD AUTO: 0.3 % (ref 0–1)
BILIRUB SERPL-MCNC: 0.6 MG/DL
BUN SERPL-MCNC: 13 MG/DL (ref 10–20)
BUN/CREAT SERPL: 13 (ref 6–20)
CALCIUM SERPL-MCNC: 10 MG/DL (ref 8.4–10.2)
CHLORIDE SERPL-SCNC: 105 MMOL/L (ref 98–107)
CO2 SERPL-SCNC: 22 MMOL/L (ref 23–31)
CREAT SERPL-MCNC: 1.01 MG/DL (ref 0.55–1.02)
DIFFERENTIAL METHOD BLD: ABNORMAL
EGFR (NO RACE VARIABLE) (RUSH/TITUS): 57 ML/MIN/1.73M2
EOSINOPHIL # BLD AUTO: 0.06 K/UL (ref 0–0.5)
EOSINOPHIL NFR BLD AUTO: 0.8 % (ref 1–4)
ERYTHROCYTE [DISTWIDTH] IN BLOOD BY AUTOMATED COUNT: 14 % (ref 11.5–14.5)
GLOBULIN SER-MCNC: 3.3 G/DL (ref 2–4)
GLUCOSE SERPL-MCNC: 152 MG/DL (ref 82–115)
HCT VFR BLD AUTO: 37.5 % (ref 38–47)
HGB BLD-MCNC: 11.8 G/DL (ref 12–16)
IMM GRANULOCYTES # BLD AUTO: 0.01 K/UL (ref 0–0.04)
IMM GRANULOCYTES NFR BLD: 0.1 % (ref 0–0.4)
LYMPHOCYTES # BLD AUTO: 2.64 K/UL (ref 1–4.8)
LYMPHOCYTES NFR BLD AUTO: 35 % (ref 27–41)
MCH RBC QN AUTO: 28.2 PG (ref 27–31)
MCHC RBC AUTO-ENTMCNC: 31.5 G/DL (ref 32–36)
MCV RBC AUTO: 89.5 FL (ref 80–96)
MONOCYTES # BLD AUTO: 0.54 K/UL (ref 0–0.8)
MONOCYTES NFR BLD AUTO: 7.2 % (ref 2–6)
MPC BLD CALC-MCNC: 10.7 FL (ref 9.4–12.4)
NEUTROPHILS # BLD AUTO: 4.28 K/UL (ref 1.8–7.7)
NEUTROPHILS NFR BLD AUTO: 56.6 % (ref 53–65)
NRBC # BLD AUTO: 0 X10E3/UL
NRBC, AUTO (.00): 0 %
PLATELET # BLD AUTO: 213 K/UL (ref 150–400)
POC OCCULT BLOOD STOOL: POSITIVE
POTASSIUM SERPL-SCNC: 3.4 MMOL/L (ref 3.5–5.1)
PROT SERPL-MCNC: 7.3 G/DL (ref 5.8–7.6)
RBC # BLD AUTO: 4.19 M/UL (ref 4.2–5.4)
SODIUM SERPL-SCNC: 140 MMOL/L (ref 136–145)
WBC # BLD AUTO: 7.55 K/UL (ref 4.5–11)

## 2025-02-28 PROCEDURE — 80053 COMPREHEN METABOLIC PANEL: CPT | Performed by: FAMILY MEDICINE

## 2025-02-28 PROCEDURE — 99283 EMERGENCY DEPT VISIT LOW MDM: CPT

## 2025-02-28 PROCEDURE — 82272 OCCULT BLD FECES 1-3 TESTS: CPT

## 2025-02-28 PROCEDURE — 85025 COMPLETE CBC W/AUTO DIFF WBC: CPT | Performed by: FAMILY MEDICINE

## 2025-03-01 RX ORDER — OMEPRAZOLE 20 MG/1
20 CAPSULE, DELAYED RELEASE ORAL DAILY
Qty: 30 CAPSULE | Refills: 0 | Status: SHIPPED | OUTPATIENT
Start: 2025-03-01 | End: 2025-03-31

## 2025-03-01 RX ORDER — OMEPRAZOLE 20 MG/1
20 CAPSULE, DELAYED RELEASE ORAL DAILY
Qty: 30 CAPSULE | Refills: 1 | Status: SHIPPED | OUTPATIENT
Start: 2025-03-01 | End: 2025-03-01

## 2025-03-01 NOTE — ED PROVIDER NOTES
Encounter Date: 2/28/2025    SCRIBE #1 NOTE: I, Farhad Todd, am scribing for, and in the presence of,  Nehemias Thornton DO. I have scribed the entire note.       History     Chief Complaint   Patient presents with    Rectal Bleeding     Pt c/o bright red blood coming from rectum and abd pain Pt states this started last night. Denies nausea or vomiting     Fatmata Tolbert is a 79 y.o. female presenting to the ED wit c/o rectal bleeding that began at 3am. PT states that she had an upset stomach and was going to the bathroom often before she noticed the bleeding. She denies nausea or vomiting as well as Hx of hemorrhoids. PT has Hx of Acute superficial gastritis without hemorrhage, Anxiety, Cardiomyopathy, Diabetes, DJD, Duodenitis, GERD, HH , HTN, Hyperlipidemia, ANNA, and Pseudophakia.    The history is provided by the patient. No  was used.     Review of patient's allergies indicates:   Allergen Reactions    Janumet [sitagliptin phos-metformin] Diarrhea    Amoxicillin Other (See Comments)    Augmentin [amoxicillin-pot clavulanate] Diarrhea    Clarithromycin Diarrhea    Metformin hcl Diarrhea     Past Medical History:   Diagnosis Date    Acute superficial gastritis without hemorrhage 07/14/2022    Anxiety     Cardiomyopathy     Diabetes, polyneuropathy     DJD (degenerative joint disease), multiple sites     Duodenitis 07/14/2022    GERD (gastroesophageal reflux disease)     HH (hiatus hernia) 07/14/2022    HTN (hypertension)     Hyperlipidemia     ANNA (obstructive sleep apnea)     Pseudophakia 02/09/2021     Past Surgical History:   Procedure Laterality Date    BREAST BIOPSY      CARDIAC ELECTROPHYSIOLOGY STUDY AND ABLATION      heart catherization       x2    HYSTERECTOMY      TOTAL ABDOMINAL HYSTERECTOMY W/ BILATERAL SALPINGOOPHORECTOMY       Family History   Problem Relation Name Age of Onset    Diabetes Mother      Stroke Mother      Heart disease Father      Hypertension Father       Prostate cancer Father      Breast cancer Sister      Hypertension Sister      Heart disease Sister      Stroke Sister      Stroke Brother      Stroke Brother      Kidney disease Brother      Breast cancer Maternal Grandmother       Social History[1]  Review of Systems   Constitutional: Negative.  Negative for fever.   HENT: Negative.  Negative for sore throat.    Eyes: Negative.    Respiratory: Negative.  Negative for shortness of breath.    Cardiovascular: Negative.  Negative for chest pain.   Gastrointestinal: Negative.  Negative for nausea and vomiting.        PT has rectal bleeding.   Endocrine: Negative.    Genitourinary: Negative.  Negative for dysuria.   Musculoskeletal: Negative.  Negative for back pain.   Skin: Negative.  Negative for rash.   Allergic/Immunologic: Negative.    Neurological: Negative.  Negative for weakness.   Hematological: Negative.  Does not bruise/bleed easily.   Psychiatric/Behavioral: Negative.     All other systems reviewed and are negative.      Physical Exam     Initial Vitals [02/28/25 2228]   BP Pulse Resp Temp SpO2   131/65 66 18 97.6 °F (36.4 °C) 100 %      MAP       --         Physical Exam    Vitals reviewed.  Constitutional: She appears well-developed and well-nourished.   HENT:   Head: Normocephalic and atraumatic.   Right Ear: External ear normal.   Left Ear: External ear normal.   Nose: Nose normal. Mouth/Throat: Oropharynx is clear and moist.   Eyes: Conjunctivae and EOM are normal. Pupils are equal, round, and reactive to light.   Neck: Neck supple.   Normal range of motion.  Cardiovascular:  Normal rate, regular rhythm, normal heart sounds and intact distal pulses.           Pulmonary/Chest: Breath sounds normal.   Abdominal: Abdomen is soft. Bowel sounds are normal.   Genitourinary:    Vagina and uterus normal.     Musculoskeletal:         General: Normal range of motion.      Cervical back: Normal range of motion and neck supple.     Neurological: She is alert and  oriented to person, place, and time. She has normal strength and normal reflexes.   Skin: Skin is warm and dry. Capillary refill takes less than 2 seconds.   Psychiatric: She has a normal mood and affect. Her behavior is normal. Judgment and thought content normal.       ED Course   Procedures  Labs Reviewed   COMPREHENSIVE METABOLIC PANEL - Abnormal       Result Value    Sodium 140      Potassium 3.4 (*)     Chloride 105      CO2 22 (*)     Anion Gap 16      Glucose 152 (*)     BUN 13      Creatinine 1.01      BUN/Creatinine Ratio 13      Calcium 10.0      Total Protein 7.3      Albumin 4.0      Globulin 3.3      A/G Ratio 1.2      Bilirubin, Total 0.6      Alk Phos 55      ALT 22      AST 24      eGFR 57 (*)    CBC WITH DIFFERENTIAL - Abnormal    WBC 7.55      RBC 4.19 (*)     Hemoglobin 11.8 (*)     Hematocrit 37.5 (*)     MCV 89.5      MCH 28.2      MCHC 31.5 (*)     RDW 14.0      Platelet Count 213      MPV 10.7      Neutrophils % 56.6      Lymphocytes % 35.0      Monocytes % 7.2 (*)     Eosinophils % 0.8 (*)     Basophils % 0.3      Immature Granulocytes % 0.1      nRBC, Auto 0.0      Neutrophils, Abs 4.28      Lymphocytes, Absolute 2.64      Monocytes, Absolute 0.54      Eosinophils, Absolute 0.06      Basophils, Absolute 0.02      Immature Granulocytes, Absolute 0.01      nRBC, Absolute 0.00      Diff Type Auto     CBC W/ AUTO DIFFERENTIAL    Narrative:     The following orders were created for panel order CBC auto differential.  Procedure                               Abnormality         Status                     ---------                               -----------         ------                     CBC with Differential[[id number]]       Abnormal            Final result                 Please view results for these tests on the individual orders.   POCT OCCULT BLOOD (STOOL)    Fecal Occult Blood Positive            Imaging Results    None          Medications - No data to display  Medical Decision  Making  Amount and/or Complexity of Data Reviewed  Labs: ordered.    Risk  Prescription drug management.              Attending Attestation:           Physician Attestation for Scribe:  Physician Attestation Statement for Scribe #1: I, Nehemias Thornton DO, reviewed documentation, as scribed by Farhad Todd in my presence, and it is both accurate and complete.                        Medical Decision Making:   Initial Assessment:   Fatmata Tolbert is a 79 y.o. female presenting to the ED wit c/o rectal bleeding that began at 3am. PT states that she had an upset stomach and was going to the bathroom often before she noticed the bleeding. She denies nausea or vomiting as well as Hx of hemorrhoids. PT has Hx of Acute superficial gastritis without hemorrhage, Anxiety, Cardiomyopathy, Diabetes, DJD, Duodenitis, GERD, HH , HTN, Hyperlipidemia, ANNA, and Pseudophakia.    The history is provided by the patient. No  was used.     Differential Diagnosis:   Rectal bleed             Clinical Impression:  Final diagnoses:  [K62.5] Rectal bleeding (Primary)          ED Disposition Condition    Discharge Stable          ED Prescriptions       Medication Sig Dispense Start Date End Date Auth. Provider    omeprazole (PRILOSEC) 20 MG capsule  (Status: Discontinued) Take 1 capsule (20 mg total) by mouth once daily. 30 capsule 3/1/2025 3/1/2025 Nehemias Thornton DO    omeprazole (PRILOSEC) 20 MG capsule Take 1 capsule (20 mg total) by mouth once daily. 30 capsule 3/1/2025 3/31/2025 Nehemias Thornton DO          Follow-up Information    None            [1]   Social History  Tobacco Use    Smoking status: Never     Passive exposure: Never    Smokeless tobacco: Never   Substance Use Topics    Alcohol use: Never    Drug use: Never        Nehemias Thornton DO  03/06/25 0705

## 2025-03-05 ENCOUNTER — OFFICE VISIT (OUTPATIENT)
Dept: GASTROENTEROLOGY | Facility: CLINIC | Age: 80
End: 2025-03-05
Payer: MEDICARE

## 2025-03-05 VITALS
SYSTOLIC BLOOD PRESSURE: 115 MMHG | BODY MASS INDEX: 25.84 KG/M2 | HEIGHT: 66 IN | OXYGEN SATURATION: 97 % | WEIGHT: 160.81 LBS | HEART RATE: 85 BPM | DIASTOLIC BLOOD PRESSURE: 63 MMHG

## 2025-03-05 DIAGNOSIS — K59.00 CONSTIPATION, UNSPECIFIED CONSTIPATION TYPE: ICD-10-CM

## 2025-03-05 DIAGNOSIS — R19.7 DIARRHEA, UNSPECIFIED TYPE: ICD-10-CM

## 2025-03-05 DIAGNOSIS — K62.5 RECTAL BLEEDING: Primary | ICD-10-CM

## 2025-03-05 PROCEDURE — 99999 PR PBB SHADOW E&M-EST. PATIENT-LVL V: CPT | Mod: PBBFAC,,, | Performed by: NURSE PRACTITIONER

## 2025-03-05 PROCEDURE — 3288F FALL RISK ASSESSMENT DOCD: CPT | Mod: CPTII,,, | Performed by: NURSE PRACTITIONER

## 2025-03-05 PROCEDURE — 99214 OFFICE O/P EST MOD 30 MIN: CPT | Mod: S$PBB,,, | Performed by: NURSE PRACTITIONER

## 2025-03-05 PROCEDURE — 1159F MED LIST DOCD IN RCRD: CPT | Mod: CPTII,,, | Performed by: NURSE PRACTITIONER

## 2025-03-05 PROCEDURE — 3074F SYST BP LT 130 MM HG: CPT | Mod: CPTII,,, | Performed by: NURSE PRACTITIONER

## 2025-03-05 PROCEDURE — 3078F DIAST BP <80 MM HG: CPT | Mod: CPTII,,, | Performed by: NURSE PRACTITIONER

## 2025-03-05 PROCEDURE — 99215 OFFICE O/P EST HI 40 MIN: CPT | Mod: PBBFAC | Performed by: NURSE PRACTITIONER

## 2025-03-05 PROCEDURE — 1101F PT FALLS ASSESS-DOCD LE1/YR: CPT | Mod: CPTII,,, | Performed by: NURSE PRACTITIONER

## 2025-03-05 RX ORDER — HYDROCORTISONE ACETATE 25 MG/1
25 SUPPOSITORY RECTAL 2 TIMES DAILY
Qty: 20 SUPPOSITORY | Refills: 0 | Status: SHIPPED | OUTPATIENT
Start: 2025-03-05 | End: 2025-03-15

## 2025-03-05 NOTE — PROGRESS NOTES
Fatmata Tolbert is a 79 y.o. female here for Rectal Bleeding        PCP: Allie Andersen  Referring Provider: No referring provider defined for this encounter.     HPI:  Presents with report of anal bleeding.  Patient presented to the ER on 02/28/2025 for rectal bleeding.  ER record reviewed, HGB 11.8 and HCT 37.5.  This is an improvement in H&H since December. Patient has a chronic anemia.  Iron studies and SPEP has been normal.  Patient states that she had anal bleeding that caused her to wear a sanitary pad.  Blood was not always related to a bowel movement.  States that she has constipation and generally takes MiraLax.  She denies any rectal pain.  She had a colonoscopy on 01/03/2024, diverticulosis, no colon polyps.  No known history of hemorrhoids.  We did discuss that hematochezia without a bowel movement can be related to internal hemorrhoids.  We also discussed repeating colonoscopy for hematochezia.  At this time patient would like to try Anusol suppositories and see if anal bleeding improves.  She will continue MiraLax powder 17 g in 8 oz of liquid daily. Patient is not taking NSAID's.  Patient has gastroparesis.  She is also currently taking Trulicity.  She denies any nausea or vomiting.  No heartburn reported.  Advised that she may discontinue Pepcid.  The last EGD was 07/14/2022, 4 cm hiatal hernia, minimal redness noted in the stomach.    Rectal Bleeding  Pertinent negatives include no abdominal pain, change in bowel habit, fatigue, fever, nausea or vomiting.         ROS:  Review of Systems   Constitutional:  Negative for appetite change, fatigue, fever and unexpected weight change.   HENT:  Negative for trouble swallowing.    Gastrointestinal:  Positive for anal bleeding, constipation and hematochezia. Negative for abdominal pain, blood in stool, change in bowel habit, diarrhea, nausea, rectal pain, vomiting, reflux and fecal incontinence.   Musculoskeletal:  Negative for gait problem.    Integumentary:  Negative for pallor.   Psychiatric/Behavioral:  The patient is not nervous/anxious.           PMHX:  has a past medical history of Acute superficial gastritis without hemorrhage (07/14/2022), Anxiety, Cardiomyopathy, Diabetes, polyneuropathy, DJD (degenerative joint disease), multiple sites, Duodenitis (07/14/2022), GERD (gastroesophageal reflux disease), HH (hiatus hernia) (07/14/2022), HTN (hypertension), Hyperlipidemia, ANNA (obstructive sleep apnea), and Pseudophakia (02/09/2021).    PSHX:  has a past surgical history that includes Breast biopsy; Hysterectomy; Total abdominal hysterectomy w/ bilateral salpingoophorectomy; Cardiac electrophysiology study and ablation; and heart catherization .    PFHX: family history includes Breast cancer in her maternal grandmother and sister; Diabetes in her mother; Heart disease in her father and sister; Hypertension in her father and sister; Kidney disease in her brother; Prostate cancer in her father; Stroke in her brother, brother, mother, and sister.    PSlHX:  reports that she has never smoked. She has never been exposed to tobacco smoke. She has never used smokeless tobacco. She reports that she does not drink alcohol and does not use drugs.        Review of patient's allergies indicates:   Allergen Reactions    Janumet [sitagliptin phos-metformin] Diarrhea    Amoxicillin Other (See Comments)    Augmentin [amoxicillin-pot clavulanate] Diarrhea    Clarithromycin Diarrhea    Metformin hcl Diarrhea       Medication List with Changes/Refills   New Medications    HYDROCORTISONE (ANUSOL-HC) 25 MG SUPPOSITORY    Place 1 suppository (25 mg total) rectally 2 (two) times daily. for 10 days   Current Medications    ACCU-CHEK GUIDE GLUCOSE METER MISC    1 strip by Misc.(Non-Drug; Combo Route) route once daily.    ACCU-CHEK SOFTCLIX LANCETS MISC    TEST BLOOD SUGAR ONE TIME DAILY    ALCOHOL SWABS (DROPSAFE ALCOHOL PREP PADS) PADM    USE TO CLEANSE THE SKIN PRIOR TO  DAILY GLUCOSE CHECK    AMLODIPINE (NORVASC) 5 MG TABLET    Take 1 tablet (5 mg total) by mouth once daily.    ASPIRIN (ECOTRIN) 81 MG EC TABLET    Take 81 mg by mouth once daily.    BLOOD SUGAR DIAGNOSTIC (ACCU-CHEK GUIDE TEST STRIPS) STRP    TEST BLOOD SUGAR EVERY DAY AS DIRECTED    CETIRIZINE (ZYRTEC) 10 MG TABLET    Take 1 tablet (10 mg total) by mouth once daily.    CHOLECALCIFEROL, VITAMIN D3, 125 MCG (5,000 UNIT) TAB    Take 1 tablet (5,000 Units total) by mouth once daily.    CYCLOBENZAPRINE (FLEXERIL) 10 MG TABLET    SMARTSI Tablet(s) By Mouth Every Evening PRN    EMPAGLIFLOZIN (JARDIANCE) 25 MG TABLET    Take 1 tablet (25 mg total) by mouth once daily.    FAMOTIDINE (PEPCID) 20 MG TABLET    TAKE 1 TABLET TWICE DAILY    FLUTICASONE PROPIONATE (FLONASE) 50 MCG/ACTUATION NASAL SPRAY    2 sprays (100 mcg total) by Each Nostril route once daily.    GUAIFENESIN (MUCINEX) 600 MG 12 HR TABLET    Take 2 tablets (1,200 mg total) by mouth 2 (two) times daily.    HYDROCODONE-ACETAMINOPHEN (NORCO)  MG PER TABLET    Take 1 tablet by mouth every 6 (six) hours as needed for Pain.    LISINOPRIL (PRINIVIL,ZESTRIL) 40 MG TABLET    Take 1 tablet (40 mg total) by mouth once daily.    SORAYA BIOTIN ORAL    Take 3,000 mcg by mouth 2 (two) times a day.    MELOXICAM (MOBIC) 15 MG TABLET    Take 1 tablet (15 mg total) by mouth once daily.    METOPROLOL SUCCINATE (TOPROL-XL) 100 MG 24 HR TABLET    Take 1 tablet (100 mg total) by mouth once daily.    MIRTAZAPINE (REMERON) 7.5 MG TAB    Take 1 tablet (7.5 mg total) by mouth every evening.    NEBIVOLOL (BYSTOLIC) 10 MG TAB    Take by mouth.    OMEGA-3 FATTY ACIDS/FISH OIL (FISH OIL-OMEGA-3 FATTY ACIDS) 300-1,000 MG CAPSULE    Take by mouth once daily.    OMEPRAZOLE (PRILOSEC) 20 MG CAPSULE    Take 1 capsule (20 mg total) by mouth once daily.    ONDANSETRON (ZOFRAN) 4 MG TABLET    Take 1 tablet (4 mg total) by mouth every 6 (six) hours as needed for Nausea.    PRAVASTATIN  "(PRAVACHOL) 20 MG TABLET    Take 1 tablet (20 mg total) by mouth nightly.    PREGABALIN (LYRICA) 75 MG CAPSULE    Take 75 mg by mouth once daily.    TRULICITY 0.75 MG/0.5 ML PEN INJECTOR    Inject into the skin.   Discontinued Medications    DOXYCYCLINE (VIBRA-TABS) 100 MG TABLET    Take 100 mg by mouth 2 (two) times daily.        Objective Findings:  Vital Signs:  /63 (BP Location: Left arm, Patient Position: Sitting)   Pulse 85   Ht 5' 6" (1.676 m)   Wt 72.9 kg (160 lb 12.8 oz)   SpO2 97%   BMI 25.95 kg/m²  Body mass index is 25.95 kg/m².    Physical Exam:  Physical Exam  Vitals and nursing note reviewed.   Constitutional:       General: She is not in acute distress.     Appearance: Normal appearance.   HENT:      Mouth/Throat:      Mouth: Mucous membranes are moist.   Cardiovascular:      Rate and Rhythm: Normal rate.   Pulmonary:      Effort: Pulmonary effort is normal.   Abdominal:      General: Bowel sounds are normal. There is no distension.      Palpations: Abdomen is soft. There is no mass.      Tenderness: There is no abdominal tenderness.   Skin:     General: Skin is warm and dry.      Coloration: Skin is not jaundiced or pale.   Neurological:      Mental Status: She is alert and oriented to person, place, and time.   Psychiatric:         Mood and Affect: Mood normal.          Labs:  Lab Results   Component Value Date    WBC 7.55 02/28/2025    HGB 11.8 (L) 02/28/2025    HCT 37.5 (L) 02/28/2025    MCV 89.5 02/28/2025    RDW 14.0 02/28/2025     02/28/2025    LYMPH 35.0 02/28/2025    LYMPH 2.64 02/28/2025    MONO 7.2 (H) 02/28/2025    EOS 0.06 02/28/2025    BASO 0.02 02/28/2025     Lab Results   Component Value Date     02/28/2025    K 3.4 (L) 02/28/2025     02/28/2025    CO2 22 (L) 02/28/2025     (H) 02/28/2025    BUN 13 02/28/2025    CREATININE 1.01 02/28/2025    CALCIUM 10.0 02/28/2025    PROT 7.3 02/28/2025    ALBUMIN 4.0 02/28/2025    BILITOT 0.6 02/28/2025    " "ALKPHOS 55 02/28/2025    AST 24 02/28/2025    ALT 22 02/28/2025         Imaging: No results found.      Assessment:  Fatmata Tolbert is a 79 y.o. female here with:  1. Rectal bleeding    2. Constipation, unspecified constipation type    3. Diarrhea, unspecified type          Recommendations:  1. Anusol suppositories as directed  2. Discussed colonoscopy for hematochezia. Patient will try Anusol suppositories. If bleeding continues we will consider repeat colonoscopy.  3. May stop Pepcid    Portions of this note may have been created with voice recognition software.  Occasional wrong word or "sound a like substitutions may have occurred due to inherent limitations of voice recognition software.  Please read the no carefully and recognize using contexts, where substitutions have occurred.    Diagnosis, risks, benefits, and side effects of any medications and treatment plan were discussed with the patient.  All questions were answered to the satisfaction of the patient, and patient verbalized understanding and agreement to the treatment plan.        Follow up in about 4 weeks (around 4/2/2025).      Order summary:  Orders Placed This Encounter    hydrocortisone (ANUSOL-HC) 25 mg suppository       Thank you for allowing me to participate in the care of Fatmata Tolbert.      NAVNEET Solares          "

## 2025-04-02 ENCOUNTER — OFFICE VISIT (OUTPATIENT)
Dept: GASTROENTEROLOGY | Facility: CLINIC | Age: 80
End: 2025-04-02
Payer: MEDICARE

## 2025-04-02 VITALS
SYSTOLIC BLOOD PRESSURE: 108 MMHG | BODY MASS INDEX: 26.03 KG/M2 | HEIGHT: 66 IN | DIASTOLIC BLOOD PRESSURE: 74 MMHG | HEART RATE: 89 BPM | OXYGEN SATURATION: 99 % | WEIGHT: 162 LBS

## 2025-04-02 DIAGNOSIS — K31.84 GASTROPARESIS: ICD-10-CM

## 2025-04-02 DIAGNOSIS — K59.00 CONSTIPATION, UNSPECIFIED CONSTIPATION TYPE: Primary | ICD-10-CM

## 2025-04-02 DIAGNOSIS — K62.5 ANAL BLEEDING: ICD-10-CM

## 2025-04-02 PROCEDURE — 99213 OFFICE O/P EST LOW 20 MIN: CPT | Mod: S$PBB,,, | Performed by: NURSE PRACTITIONER

## 2025-04-02 PROCEDURE — 99215 OFFICE O/P EST HI 40 MIN: CPT | Mod: PBBFAC | Performed by: NURSE PRACTITIONER

## 2025-04-02 PROCEDURE — 1159F MED LIST DOCD IN RCRD: CPT | Mod: CPTII,,, | Performed by: NURSE PRACTITIONER

## 2025-04-02 PROCEDURE — 1160F RVW MEDS BY RX/DR IN RCRD: CPT | Mod: CPTII,,, | Performed by: NURSE PRACTITIONER

## 2025-04-02 PROCEDURE — 3074F SYST BP LT 130 MM HG: CPT | Mod: CPTII,,, | Performed by: NURSE PRACTITIONER

## 2025-04-02 PROCEDURE — 99999 PR PBB SHADOW E&M-EST. PATIENT-LVL V: CPT | Mod: PBBFAC,,, | Performed by: NURSE PRACTITIONER

## 2025-04-02 PROCEDURE — 3078F DIAST BP <80 MM HG: CPT | Mod: CPTII,,, | Performed by: NURSE PRACTITIONER

## 2025-04-02 RX ORDER — FAMOTIDINE 20 MG/1
20 TABLET, FILM COATED ORAL 2 TIMES DAILY
Qty: 180 TABLET | Refills: 3 | Status: SHIPPED | OUTPATIENT
Start: 2025-04-02

## 2025-04-02 NOTE — PROGRESS NOTES
Fatmata Tolbert is a 79 y.o. female here for Follow-up        PCP: Allie Andersen  Referring Provider: Tessa Hastings, Rich  [address] [facility] [place] [address]     HPI:  Presents in follow-up due to anal bleeding.  The patient had anal bleeding that required a sanitary pad.  Anal bleeding was not always associated with bowel movement.  Anal bleeding has resolved.  Patient did complete Anusol suppositories.  States that this did improve her symptoms.Patient presented to the ER on 02/28/2025 for rectal bleeding.  ER record reviewed, HGB 11.8 and HCT 37.5.  This is an improvement in H&H since December. Patient has a chronic anemia.  Iron studies and SPEP has been normal.  She is taking MiraLax powder for constipation.  No abdominal pain.  No unintentional weight loss.  Last colonoscopy was 01/03/2024, no colon polyps.  Since the patient has not had any recurrence of anal bleeding and symptoms did resolve with Anusol suppository we will delay repeat colonoscopy at this time.  No nausea or vomiting.  She does have previous history of C diff infection.  She also has gastroparesis.  EGD on 07/14/2022, 4 cm hiatal hernia.    Follow-up  Pertinent negatives include no abdominal pain, change in bowel habit, chest pain, fatigue, fever, nausea or vomiting.         ROS:  Review of Systems   Constitutional:  Negative for appetite change, fatigue, fever and unexpected weight change.   HENT:  Negative for trouble swallowing.    Cardiovascular:  Negative for chest pain.   Gastrointestinal:  Positive for constipation. Negative for abdominal pain, anal bleeding, blood in stool, change in bowel habit, diarrhea, nausea, rectal pain, vomiting and reflux.   Musculoskeletal:  Negative for gait problem.   Integumentary:  Negative for pallor.   Psychiatric/Behavioral:  The patient is not nervous/anxious.           PMHX:  has a past medical history of Acute superficial gastritis without hemorrhage (07/14/2022),  Anxiety, Cardiomyopathy, Diabetes, polyneuropathy, DJD (degenerative joint disease), multiple sites, Duodenitis (07/14/2022), GERD (gastroesophageal reflux disease), HH (hiatus hernia) (07/14/2022), HTN (hypertension), Hyperlipidemia, ANNA (obstructive sleep apnea), and Pseudophakia (02/09/2021).    PSHX:  has a past surgical history that includes Breast biopsy; Hysterectomy; Total abdominal hysterectomy w/ bilateral salpingoophorectomy; Cardiac electrophysiology study and ablation; and heart catherization .    PFHX: family history includes Breast cancer in her maternal grandmother and sister; Diabetes in her mother; Heart disease in her father and sister; Hypertension in her father and sister; Kidney disease in her brother; Prostate cancer in her father; Stroke in her brother, brother, mother, and sister.    PSlHX:  reports that she has never smoked. She has never been exposed to tobacco smoke. She has never used smokeless tobacco. She reports that she does not drink alcohol and does not use drugs.        Review of patient's allergies indicates:   Allergen Reactions    Janumet [sitagliptin phos-metformin] Diarrhea    Amoxicillin Other (See Comments)    Augmentin [amoxicillin-pot clavulanate] Diarrhea    Clarithromycin Diarrhea    Metformin hcl Diarrhea       Medication List with Changes/Refills   Current Medications    ACCU-CHEK GUIDE GLUCOSE METER MISC    1 strip by Misc.(Non-Drug; Combo Route) route once daily.    ACCU-CHEK SOFTCLIX LANCETS MISC    TEST BLOOD SUGAR ONE TIME DAILY    ALCOHOL SWABS (DROPSAFE ALCOHOL PREP PADS) PADM    USE TO CLEANSE THE SKIN PRIOR TO DAILY GLUCOSE CHECK    AMLODIPINE (NORVASC) 5 MG TABLET    Take 1 tablet (5 mg total) by mouth once daily.    ASPIRIN (ECOTRIN) 81 MG EC TABLET    Take 81 mg by mouth once daily.    BLOOD SUGAR DIAGNOSTIC (ACCU-CHEK GUIDE TEST STRIPS) STRP    TEST BLOOD SUGAR EVERY DAY AS DIRECTED    CETIRIZINE (ZYRTEC) 10 MG TABLET    Take 1 tablet (10 mg total) by  mouth once daily.    CHOLECALCIFEROL, VITAMIN D3, 125 MCG (5,000 UNIT) TAB    Take 1 tablet (5,000 Units total) by mouth once daily.    CYCLOBENZAPRINE (FLEXERIL) 10 MG TABLET    SMARTSI Tablet(s) By Mouth Every Evening PRN    EMPAGLIFLOZIN (JARDIANCE) 25 MG TABLET    Take 1 tablet (25 mg total) by mouth once daily.    FLUTICASONE PROPIONATE (FLONASE) 50 MCG/ACTUATION NASAL SPRAY    2 sprays (100 mcg total) by Each Nostril route once daily.    GUAIFENESIN (MUCINEX) 600 MG 12 HR TABLET    Take 2 tablets (1,200 mg total) by mouth 2 (two) times daily.    HYDROCODONE-ACETAMINOPHEN (NORCO)  MG PER TABLET    Take 1 tablet by mouth every 6 (six) hours as needed for Pain.    LISINOPRIL (PRINIVIL,ZESTRIL) 40 MG TABLET    Take 1 tablet (40 mg total) by mouth once daily.    SORAYA BIOTIN ORAL    Take 3,000 mcg by mouth 2 (two) times a day.    MELOXICAM (MOBIC) 15 MG TABLET    Take 1 tablet (15 mg total) by mouth once daily.    METOPROLOL SUCCINATE (TOPROL-XL) 100 MG 24 HR TABLET    Take 1 tablet (100 mg total) by mouth once daily.    MIRTAZAPINE (REMERON) 7.5 MG TAB    Take 1 tablet (7.5 mg total) by mouth every evening.    NEBIVOLOL (BYSTOLIC) 10 MG TAB    Take by mouth.    OMEGA-3 FATTY ACIDS/FISH OIL (FISH OIL-OMEGA-3 FATTY ACIDS) 300-1,000 MG CAPSULE    Take by mouth once daily.    OMEPRAZOLE (PRILOSEC) 20 MG CAPSULE    Take 1 capsule (20 mg total) by mouth once daily.    ONDANSETRON (ZOFRAN) 4 MG TABLET    Take 1 tablet (4 mg total) by mouth every 6 (six) hours as needed for Nausea.    PRAVASTATIN (PRAVACHOL) 20 MG TABLET    Take 1 tablet (20 mg total) by mouth nightly.    PREGABALIN (LYRICA) 75 MG CAPSULE    Take 75 mg by mouth once daily.    TRULICITY 0.75 MG/0.5 ML PEN INJECTOR    Inject into the skin.   Changed and/or Refilled Medications    Modified Medication Previous Medication    FAMOTIDINE (PEPCID) 20 MG TABLET famotidine (PEPCID) 20 MG tablet       Take 1 tablet (20 mg total) by mouth 2 (two) times daily.  "   TAKE 1 TABLET TWICE DAILY        Objective Findings:  Vital Signs:  /74 (BP Location: Right arm, Patient Position: Sitting)   Pulse 89   Ht 5' 6" (1.676 m)   Wt 73.5 kg (162 lb)   SpO2 99%   BMI 26.15 kg/m²  Body mass index is 26.15 kg/m².    Physical Exam:  Physical Exam  Vitals and nursing note reviewed.   Constitutional:       General: She is not in acute distress.     Appearance: Normal appearance.   HENT:      Mouth/Throat:      Mouth: Mucous membranes are moist.   Cardiovascular:      Rate and Rhythm: Normal rate.   Pulmonary:      Effort: Pulmonary effort is normal.   Abdominal:      General: Bowel sounds are normal. There is no distension.      Palpations: Abdomen is soft. There is no mass.      Tenderness: There is no abdominal tenderness.   Skin:     General: Skin is warm and dry.      Coloration: Skin is not jaundiced or pale.   Neurological:      Mental Status: She is alert and oriented to person, place, and time.   Psychiatric:         Mood and Affect: Mood normal.          Labs:  Lab Results   Component Value Date    WBC 7.55 02/28/2025    HGB 11.8 (L) 02/28/2025    HCT 37.5 (L) 02/28/2025    MCV 89.5 02/28/2025    RDW 14.0 02/28/2025     02/28/2025    LYMPH 35.0 02/28/2025    LYMPH 2.64 02/28/2025    MONO 7.2 (H) 02/28/2025    EOS 0.06 02/28/2025    BASO 0.02 02/28/2025     Lab Results   Component Value Date     02/28/2025    K 3.4 (L) 02/28/2025     02/28/2025    CO2 22 (L) 02/28/2025     (H) 02/28/2025    BUN 13 02/28/2025    CREATININE 1.01 02/28/2025    CALCIUM 10.0 02/28/2025    PROT 7.3 02/28/2025    ALBUMIN 4.0 02/28/2025    BILITOT 0.6 02/28/2025    ALKPHOS 55 02/28/2025    AST 24 02/28/2025    ALT 22 02/28/2025         Imaging: No results found.      Assessment:  Fatmata Tolbert is a 79 y.o. female here with:  1. Constipation, unspecified constipation type    2. Anal bleeding    3. Gastroparesis          Recommendations:  1. Patient is cautioned to avoid " "constipation.  Is currently taking MiraLax powder daily.  Advised that she may increase MiraLax powder twice daily if needed for constipation.  2. Patient is advised to notify our office if anal bleeding returns  3. Do not lay down within 3 hours of eating.  Avoid spicy, greasy foods  Eat 6-8 small meals per day.  Exercise 150 minutes per week  Avoid raw fruits and vegetables  Small amount of protein and fiber at one time    Portions of this note may have been created with voice recognition software.  Occasional wrong word or "sound a like substitutions may have occurred due to inherent limitations of voice recognition software.  Please read the note carefully and recognize using contexts, where substitutions have occurred.    Diagnosis, risks, benefits, and side effects of any medications and treatment plan were discussed with the patient.  All questions were answered to the satisfaction of the patient, and patient verbalized understanding and agreement to the treatment plan.          Follow up in about 6 months (around 10/2/2025).      Order summary:  Orders Placed This Encounter    famotidine (PEPCID) 20 MG tablet       Thank you for allowing me to participate in the care of Fatmata Tolbert.      Tessa Hastings, MELI-C          "

## 2025-04-03 RX ORDER — OMEPRAZOLE 20 MG/1
20 CAPSULE, DELAYED RELEASE ORAL
Qty: 30 CAPSULE | Refills: 11 | Status: SHIPPED | OUTPATIENT
Start: 2025-04-03

## 2025-04-21 ENCOUNTER — OFFICE VISIT (OUTPATIENT)
Dept: NEPHROLOGY | Facility: CLINIC | Age: 80
End: 2025-04-21
Payer: MEDICARE

## 2025-04-21 VITALS
DIASTOLIC BLOOD PRESSURE: 62 MMHG | WEIGHT: 162.19 LBS | BODY MASS INDEX: 26.07 KG/M2 | HEIGHT: 66 IN | RESPIRATION RATE: 18 BRPM | SYSTOLIC BLOOD PRESSURE: 106 MMHG | OXYGEN SATURATION: 98 % | HEART RATE: 87 BPM

## 2025-04-21 DIAGNOSIS — N18.31 STAGE 3A CHRONIC KIDNEY DISEASE: Primary | ICD-10-CM

## 2025-04-21 PROCEDURE — 1159F MED LIST DOCD IN RCRD: CPT | Mod: CPTII,,, | Performed by: INTERNAL MEDICINE

## 2025-04-21 PROCEDURE — 3288F FALL RISK ASSESSMENT DOCD: CPT | Mod: CPTII,,, | Performed by: INTERNAL MEDICINE

## 2025-04-21 PROCEDURE — 99214 OFFICE O/P EST MOD 30 MIN: CPT | Mod: PBBFAC | Performed by: INTERNAL MEDICINE

## 2025-04-21 PROCEDURE — 99999 PR PBB SHADOW E&M-EST. PATIENT-LVL IV: CPT | Mod: PBBFAC,,, | Performed by: INTERNAL MEDICINE

## 2025-04-21 PROCEDURE — 99214 OFFICE O/P EST MOD 30 MIN: CPT | Mod: S$PBB,,, | Performed by: INTERNAL MEDICINE

## 2025-04-21 PROCEDURE — 1101F PT FALLS ASSESS-DOCD LE1/YR: CPT | Mod: CPTII,,, | Performed by: INTERNAL MEDICINE

## 2025-04-21 PROCEDURE — 1126F AMNT PAIN NOTED NONE PRSNT: CPT | Mod: CPTII,,, | Performed by: INTERNAL MEDICINE

## 2025-04-21 PROCEDURE — 3074F SYST BP LT 130 MM HG: CPT | Mod: CPTII,,, | Performed by: INTERNAL MEDICINE

## 2025-04-21 PROCEDURE — 3078F DIAST BP <80 MM HG: CPT | Mod: CPTII,,, | Performed by: INTERNAL MEDICINE

## 2025-04-21 RX ORDER — GLIPIZIDE 5 MG/1
5 TABLET ORAL 2 TIMES DAILY
COMMUNITY
End: 2025-04-21

## 2025-04-21 RX ORDER — DULAGLUTIDE 3 MG/.5ML
3 INJECTION, SOLUTION SUBCUTANEOUS WEEKLY
COMMUNITY
Start: 2024-11-25 | End: 2025-04-21

## 2025-04-21 RX ORDER — ONDANSETRON 4 MG/1
TABLET, ORALLY DISINTEGRATING ORAL
COMMUNITY
Start: 2025-03-20 | End: 2025-04-21

## 2025-04-21 RX ORDER — DULAGLUTIDE 4.5 MG/.5ML
4.5 INJECTION, SOLUTION SUBCUTANEOUS WEEKLY
COMMUNITY

## 2025-04-21 NOTE — PROGRESS NOTES
[facility] History and Physical  Patient Name: Fatmata Tolbert  MRN: [id number]  Age: 79 y.o.  : 1945    Date: 2025 8:43 AM    Chief Complaint: Follow up    HPI :   Ms Tolbert presents to Nephrology clinic for follow up. She is followed by Dr. Allie Andersen as her primary care provider.     HTN:  Current regimen includes Amlodipine 5 mg daily, Lisinopril 40 mg daily,  bystolic 10 g daily    DM2: Current regimen includes Jardiance 25 mg daily, Trulicity. Last A1c 8.2%     Nephrology history: No FH of kidney disease, no nephrolithiasis, or recurrent UTIs. Rx on med list for Meloxicam.     Patient denies any CP, SOB, peripheral edema, dysuria, hematuria, changes in urinary habits, or increased frequency of urination.     Past Medical History:  has a past medical history of Acute superficial gastritis without hemorrhage (2022), Anxiety, Cardiomyopathy, Diabetes, polyneuropathy, DJD (degenerative joint disease), multiple sites, Duodenitis (2022), GERD (gastroesophageal reflux disease), HH (hiatus hernia) (2022), HTN (hypertension), Hyperlipidemia, ANNA (obstructive sleep apnea), and Pseudophakia (2021).     Past Surgical History:   has a past surgical history that includes Breast biopsy; Hysterectomy; Total abdominal hysterectomy w/ bilateral salpingoophorectomy; Cardiac electrophysiology study and ablation; and heart catherization .     Family History:  family history includes Breast cancer in her maternal grandmother and sister; Diabetes in her mother; Heart disease in her father and sister; Hypertension in her father and sister; Kidney disease in her brother; Prostate cancer in her father; Stroke in her brother, brother, mother, and sister. No family history of kidney disease.     Social History:   reports that she has never smoked. She has never been exposed to tobacco smoke. She has never used smokeless tobacco. She reports that she  does not drink alcohol and does not use drugs.     Allergies: is allergic to janumet [sitagliptin phos-metformin], amoxicillin, augmentin [amoxicillin-pot clavulanate], clarithromycin, and metformin hcl.     Medications: Reviewed including OTC medications, herbal supplements, and NSAIDS.     Old records have been reviewed.      Review of Systems:  ROS: A 10 point ROS was completed and found to be negative except for that mentioned above.          Physical Exam:  Vitals:    04/21/25 1437   BP: 106/62   Pulse: 87   Resp: 18       Constitutional: sitting in chair, in NAD  Eyes: EOMI, white sclera  ENMT: moist mucus membranes, nares patent  Cardiovascular: normal rate, S1/S2 noted, no edema  Respiratory: symmetrical chest expansion, CTA-B  Gastrointestinal: +BS, soft, NT/ND  Musculoskeletal: normal, no joint erythema/effusions  Skin: no rash, no purpura, warm extremities  Neurological: Alert and Oriented x 3, afocal    Labs:   Lab Results   Component Value Date     02/28/2025    K 3.4 (L) 02/28/2025    CREATININE 1.01 02/28/2025    ALT 22 02/28/2025    AST 24 02/28/2025    HGBA1C 8 (H) 01/29/2025    LDLCALC 72 09/25/2023    CHOL 158 09/25/2023    HDL 75 (H) 09/25/2023    TRIG 57 09/25/2023    TSH 0.587 09/28/2022    WBC 7.55 02/28/2025    HGB 11.8 (L) 02/28/2025    HCT 37.5 (L) 02/28/2025     02/28/2025        Otherwise Reviewed    Assessment/Plan:       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 Baseline sCr 1.0-1.2, today sCr pending. Counseled to avoid nephrotoxic agents such as NSAIDs.     Proteinuria: urine Prot:Creat ratio is pending. Patient is on RAAS blockade with ACEi.     Anemia: CBC pending    BMD: Calcium and Phosphorus PTH, Vit D pending    HTN: well controlled with current meds     DM type 2: on ACEi, on SGTL2i; on goal directed therapy agree with both.     RTC 12 months with CBC, RFP, UA, urine for Prot:creat ratio, PTH, Vit D NP clinic      Signature:       Nette Ahmadi,   [facility]

## 2025-04-22 ENCOUNTER — TELEPHONE (OUTPATIENT)
Dept: NEPHROLOGY | Facility: CLINIC | Age: 80
End: 2025-04-22
Payer: MEDICARE

## 2025-04-22 ENCOUNTER — RESULTS FOLLOW-UP (OUTPATIENT)
Dept: NEPHROLOGY | Facility: CLINIC | Age: 80
End: 2025-04-22

## 2025-04-22 NOTE — TELEPHONE ENCOUNTER
----- Message from Nette Ahmadi DO sent at 4/22/2025 11:06 AM CDT -----  Renal function is stable. She has mild iron deficiency anemia. I recommend daily OTC oral iron supplement. No protein in her urine. No further changes at this time> TY   ----- Message -----  From: Lab, Background User  Sent: 4/21/2025   5:43 PM CDT  To: Nette Ahmadi DO

## 2025-05-07 DIAGNOSIS — L84 CORNS AND CALLOSITIES: Primary | ICD-10-CM

## 2025-05-07 DIAGNOSIS — M20.42 HAMMERTOES OF BOTH FEET: ICD-10-CM

## 2025-05-07 DIAGNOSIS — M20.41 HAMMERTOES OF BOTH FEET: ICD-10-CM

## 2025-06-03 DIAGNOSIS — M79.672 BILATERAL FOOT PAIN: Primary | ICD-10-CM

## 2025-06-03 DIAGNOSIS — M79.671 BILATERAL FOOT PAIN: Primary | ICD-10-CM

## 2025-06-04 ENCOUNTER — HOSPITAL ENCOUNTER (OUTPATIENT)
Dept: RADIOLOGY | Facility: HOSPITAL | Age: 80
Discharge: HOME | End: 2025-06-04
Attending: ORTHOPAEDIC SURGERY
Payer: MEDICARE

## 2025-06-04 ENCOUNTER — OFFICE VISIT (OUTPATIENT)
Dept: ORTHOPEDICS | Facility: CLINIC | Age: 80
End: 2025-06-04
Payer: MEDICARE

## 2025-06-04 DIAGNOSIS — M20.42 HAMMERTOES OF BOTH FEET: ICD-10-CM

## 2025-06-04 DIAGNOSIS — M79.671 BILATERAL FOOT PAIN: ICD-10-CM

## 2025-06-04 DIAGNOSIS — M20.41 HAMMERTOES OF BOTH FEET: ICD-10-CM

## 2025-06-04 DIAGNOSIS — L84 CORNS AND CALLOSITIES: ICD-10-CM

## 2025-06-04 DIAGNOSIS — M79.672 BILATERAL FOOT PAIN: ICD-10-CM

## 2025-06-04 PROCEDURE — 1159F MED LIST DOCD IN RCRD: CPT | Mod: CPTII,,, | Performed by: ORTHOPAEDIC SURGERY

## 2025-06-04 PROCEDURE — 99214 OFFICE O/P EST MOD 30 MIN: CPT | Mod: PBBFAC,25 | Performed by: ORTHOPAEDIC SURGERY

## 2025-06-04 PROCEDURE — 99999 PR PBB SHADOW E&M-EST. PATIENT-LVL IV: CPT | Mod: PBBFAC,,, | Performed by: ORTHOPAEDIC SURGERY

## 2025-06-04 PROCEDURE — 1160F RVW MEDS BY RX/DR IN RCRD: CPT | Mod: CPTII,,, | Performed by: ORTHOPAEDIC SURGERY

## 2025-06-04 PROCEDURE — 73630 X-RAY EXAM OF FOOT: CPT | Mod: TC,50

## 2025-06-04 PROCEDURE — 99204 OFFICE O/P NEW MOD 45 MIN: CPT | Mod: S$PBB,,, | Performed by: ORTHOPAEDIC SURGERY

## 2025-06-07 NOTE — PROGRESS NOTES
CLINIC NOTE       Chief Complaint   Patient presents with    Right Foot - Pain    Left Foot - Pain        Fatmata Tolbert is a 79 y.o. female seen today for pain.  Began insidiously many years ago.  Describes progressive hallux valgus and bunion formation.  Her right foot is more symptomatic than left.  She has begun having some pressure between the great toe and 2nd toe of the right foot causing constant pain.      Past Medical History:   Diagnosis Date    Acute superficial gastritis without hemorrhage 07/14/2022    Anxiety     Cardiomyopathy     Diabetes, polyneuropathy     DJD (degenerative joint disease), multiple sites     Duodenitis 07/14/2022    GERD (gastroesophageal reflux disease)     HH (hiatus hernia) 07/14/2022    HTN (hypertension)     Hyperlipidemia     ANNA (obstructive sleep apnea)     Pseudophakia 02/09/2021     Family History   Problem Relation Name Age of Onset    Diabetes Mother      Stroke Mother      Heart disease Father      Hypertension Father      Prostate cancer Father      Breast cancer Sister      Hypertension Sister      Heart disease Sister      Stroke Sister      Stroke Brother      Stroke Brother      Kidney disease Brother      Breast cancer Maternal Grandmother       Medications Ordered Prior to Encounter[1]    ROS     There were no vitals filed for this visit.    Past Surgical History:   Procedure Laterality Date    BREAST BIOPSY      CARDIAC ELECTROPHYSIOLOGY STUDY AND ABLATION      heart catherization       x2    HYSTERECTOMY      TOTAL ABDOMINAL HYSTERECTOMY W/ BILATERAL SALPINGOOPHORECTOMY          Review of patient's allergies indicates:   Allergen Reactions    Janumet [sitagliptin phos-metformin] Diarrhea    Amoxicillin Other (See Comments)    Augmentin [amoxicillin-pot clavulanate] Diarrhea    Clarithromycin Diarrhea    Metformin hcl Diarrhea        Ortho Exam pulling both feet show evidence of hallux valgus with bunion formation right greater than left.  That has  significant clawtoe deformities.    Radiographic Examination:  X-rays bilateral foot 06/04/2025    Technique:  6 views AP lateral oblique both feet    Findings:  Bones are adequately mineralized.  He has evidence of metatarsal varus with hallux valgus feet.  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/19/2024    Constipation 02/02/2024    Screening for malignant neoplasm of colon 01/03/2024    Diverticulosis of colon 01/03/2024    History of colon polyps 01/03/2024    Diarrhea 10/04/2023    Occipital headache 09/27/2022    Rebound headache 09/27/2022    On long term drug therapy 09/27/2022    Pain in right foot 09/20/2022    Dependence on other enabling machines and devices 09/20/2022    HH (hiatus hernia) 07/14/2022    Acute superficial gastritis without hemorrhage 07/14/2022    Duodenitis 07/14/2022    Nausea 07/07/2022    NSVT (nonsustained ventricular tachycardia) 02/23/2022    S/P radiofrequency ablation operation for arrhythmia 02/23/2022    Hyperlipidemia     Non-ischemic cardiomyopathy 12/07/2021    PVC's (premature ventricular contractions) 12/07/2021    Normocytic anemia 10/26/2021    Otalgia of right ear 10/26/2021    Clostridium difficile diarrhea 09/09/2021    Gastroparesis 09/09/2021    GERD (gastroesophageal reflux disease) 07/09/2021    Type 2 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  I have suggested referral to Cortez Holder orthopedic foot and ankle specialist at [facility] in [place].    Tristen Murillo M.D.                   [1]   Current Outpatient Medications on File Prior to Visit   Medication Sig Dispense Refill    ACCU-CHEK GUIDE GLUCOSE METER Misc 1 strip by Misc.(Non-Drug; Combo Route) route once daily. 1 each 0    ACCU-CHEK SOFTCLIX LANCETS Misc TEST BLOOD SUGAR ONE TIME DAILY 100 each 3    alcohol swabs (DROPSAFE ALCOHOL PREP PADS) PadM USE TO CLEANSE THE SKIN PRIOR TO DAILY GLUCOSE CHECK 100 each 3    amLODIPine (NORVASC) 5 MG tablet Take 1 tablet (5 mg total) by mouth once daily. 90 tablet 1    aspirin (ECOTRIN) 81 MG EC tablet Take 81 mg by mouth once daily.      blood sugar diagnostic (ACCU-CHEK GUIDE TEST STRIPS) Strp TEST BLOOD SUGAR EVERY DAY AS DIRECTED 100 strip 3    empagliflozin (JARDIANCE) 25 mg tablet Take 1 tablet (25 mg total) by mouth once daily. 90 tablet 1    famotidine (PEPCID) 20 MG tablet Take 1 tablet (20 mg total) by mouth 2 (two) times daily. 180 tablet 3    lisinopriL (PRINIVIL,ZESTRIL) 40 MG tablet Take 1 tablet (40 mg total) by mouth once daily. 90 tablet 1    SORAYA BIOTIN ORAL Take 3,000 mcg by mouth 2 (two) times a day.      meloxicam (MOBIC) 15 MG tablet Take 1 tablet (15 mg total) by mouth once daily. 90 tablet 1    ondansetron (ZOFRAN) 4 MG tablet Take 1 tablet (4 mg total) by mouth every 6 (six) hours as needed for Nausea. 90 tablet 0    pravastatin (PRAVACHOL) 20 MG tablet Take 1 tablet (20 mg total) by mouth nightly. 90 tablet 1    TRULICITY 4.5 mg/0.5 mL pen injector Inject 4.5 mg into the skin once a week.        No current facility-administered medications on file prior to visit.

## 2025-06-17 ENCOUNTER — OFFICE VISIT (OUTPATIENT)
Dept: FAMILY MEDICINE | Facility: CLINIC | Age: 80
End: 2025-06-17
Payer: MEDICARE

## 2025-06-17 VITALS
WEIGHT: 164 LBS | OXYGEN SATURATION: 98 % | BODY MASS INDEX: 26.47 KG/M2 | DIASTOLIC BLOOD PRESSURE: 66 MMHG | HEART RATE: 88 BPM | SYSTOLIC BLOOD PRESSURE: 106 MMHG | TEMPERATURE: 98 F

## 2025-06-17 DIAGNOSIS — J02.9 PHARYNGITIS, UNSPECIFIED ETIOLOGY: Primary | ICD-10-CM

## 2025-06-17 DIAGNOSIS — J32.9 SINUSITIS, UNSPECIFIED CHRONICITY, UNSPECIFIED LOCATION: ICD-10-CM

## 2025-06-17 DIAGNOSIS — Z20.828 EXPOSURE TO VIRAL DISEASE: ICD-10-CM

## 2025-06-17 LAB
CTP QC/QA: YES
SARS-COV-2 RDRP RESP QL NAA+PROBE: NEGATIVE

## 2025-06-17 PROCEDURE — 3288F FALL RISK ASSESSMENT DOCD: CPT | Mod: ,,, | Performed by: FAMILY MEDICINE

## 2025-06-17 PROCEDURE — 1160F RVW MEDS BY RX/DR IN RCRD: CPT | Mod: ,,, | Performed by: FAMILY MEDICINE

## 2025-06-17 PROCEDURE — 3074F SYST BP LT 130 MM HG: CPT | Mod: ,,, | Performed by: FAMILY MEDICINE

## 2025-06-17 PROCEDURE — 1159F MED LIST DOCD IN RCRD: CPT | Mod: ,,, | Performed by: FAMILY MEDICINE

## 2025-06-17 PROCEDURE — 96372 THER/PROPH/DIAG INJ SC/IM: CPT | Mod: ,,, | Performed by: FAMILY MEDICINE

## 2025-06-17 PROCEDURE — 99214 OFFICE O/P EST MOD 30 MIN: CPT | Mod: 25,,, | Performed by: FAMILY MEDICINE

## 2025-06-17 PROCEDURE — 3078F DIAST BP <80 MM HG: CPT | Mod: ,,, | Performed by: FAMILY MEDICINE

## 2025-06-17 PROCEDURE — 87635 SARS-COV-2 COVID-19 AMP PRB: CPT | Mod: RHCUB | Performed by: FAMILY MEDICINE

## 2025-06-17 PROCEDURE — 1101F PT FALLS ASSESS-DOCD LE1/YR: CPT | Mod: ,,, | Performed by: FAMILY MEDICINE

## 2025-06-17 RX ORDER — DOXYCYCLINE HYCLATE 100 MG
100 TABLET ORAL 2 TIMES DAILY
Qty: 20 TABLET | Refills: 0 | Status: SHIPPED | OUTPATIENT
Start: 2025-06-17 | End: 2025-06-27

## 2025-06-17 RX ORDER — DEXAMETHASONE SODIUM PHOSPHATE 4 MG/ML
2 INJECTION, SOLUTION INTRA-ARTICULAR; INTRALESIONAL; INTRAMUSCULAR; INTRAVENOUS; SOFT TISSUE
Status: COMPLETED | OUTPATIENT
Start: 2025-06-17 | End: 2025-06-17

## 2025-06-17 RX ADMIN — DEXAMETHASONE SODIUM PHOSPHATE 2 MG: 4 INJECTION, SOLUTION INTRA-ARTICULAR; INTRALESIONAL; INTRAMUSCULAR; INTRAVENOUS; SOFT TISSUE at 09:06

## 2025-06-17 NOTE — PROGRESS NOTES
Subjective:       Patient ID: Fatmata Tolbert is a 79 y.o. female.    Chief Complaint: Sore Throat (Scratchy throat) and Headache    Sore Throat   Associated symptoms include headaches. Pertinent negatives include no abdominal pain, congestion, coughing, diarrhea, drooling, ear discharge, ear pain, neck pain, shortness of breath, stridor, trouble swallowing or vomiting.   Headache   Associated symptoms include sinus pressure and a sore throat. Pertinent negatives include no abdominal pain, back pain, coughing, dizziness, ear pain, fever, hearing loss, nausea, neck pain, numbness, photophobia, rhinorrhea, seizures, tinnitus, vomiting or weakness.     Review of Systems   Constitutional:  Negative for activity change, appetite change, chills, diaphoresis, fatigue, fever and unexpected weight change.   HENT:  Positive for postnasal drip, sinus pressure/congestion and sore throat. Negative for nasal congestion, dental problem, drooling, ear discharge, ear pain, facial swelling, hearing loss, mouth sores, nosebleeds, rhinorrhea, sneezing, tinnitus, trouble swallowing, voice change and goiter.    Eyes:  Negative for photophobia, discharge, itching and visual disturbance.   Respiratory:  Negative for apnea, cough, choking, chest tightness, shortness of breath, wheezing and stridor.    Cardiovascular:  Negative for chest pain, palpitations, leg swelling and claudication.   Gastrointestinal:  Negative for abdominal distention, abdominal pain, anal bleeding, blood in stool, change in bowel habit, constipation, diarrhea, nausea and vomiting.   Endocrine: Negative for cold intolerance, heat intolerance, polydipsia, polyphagia and polyuria.   Genitourinary:  Negative for bladder incontinence, decreased urine volume, difficulty urinating, dysuria, enuresis, flank pain, frequency, hematuria, nocturia, pelvic pain and urgency.   Musculoskeletal:  Negative for arthralgias, back pain, gait problem, joint swelling, leg pain, myalgias,  neck pain, neck stiffness and joint deformity.   Integumentary:  Negative for pallor, rash, wound, breast mass and breast tenderness.   Allergic/Immunologic: Negative for environmental allergies, food allergies and immunocompromised state.   Neurological:  Positive for headaches. Negative for dizziness, vertigo, tremors, seizures, syncope, facial asymmetry, speech difficulty, weakness, light-headedness, numbness, coordination difficulties and memory loss.   Hematological:  Negative for adenopathy. Does not bruise/bleed easily.   Psychiatric/Behavioral:  Negative for agitation, behavioral problems, confusion, decreased concentration, dysphoric mood, hallucinations, self-injury, sleep disturbance and suicidal ideas. The patient is not nervous/anxious and is not hyperactive.    Breast: Negative for mass and tenderness        Objective:      Physical Exam  Vitals reviewed.   Constitutional:       Appearance: Normal appearance.   HENT:      Head: Normocephalic and atraumatic.      Right Ear: Tympanic membrane, ear canal and external ear normal.      Left Ear: Tympanic membrane, ear canal and external ear normal.      Nose: Congestion and rhinorrhea present.      Mouth/Throat:      Mouth: Mucous membranes are moist.      Pharynx: Oropharynx is clear. Posterior oropharyngeal erythema present.   Eyes:      Extraocular Movements: Extraocular movements intact.      Conjunctiva/sclera: Conjunctivae normal.      Pupils: Pupils are equal, round, and reactive to light.   Cardiovascular:      Rate and Rhythm: Normal rate and regular rhythm.      Pulses: Normal pulses.      Heart sounds: Normal heart sounds.   Pulmonary:      Effort: Pulmonary effort is normal.      Breath sounds: Normal breath sounds.   Abdominal:      General: Bowel sounds are normal.      Palpations: Abdomen is soft.   Musculoskeletal:         General: Normal range of motion.      Cervical back: Normal range of motion and neck supple.   Skin:     General: Skin  is warm and dry.   Neurological:      General: No focal deficit present.      Mental Status: She is alert. Mental status is at baseline.   Psychiatric:         Mood and Affect: Mood normal.         Behavior: Behavior normal.         Thought Content: Thought content normal.         Judgment: Judgment normal.         Assessment:       1. Pharyngitis, unspecified etiology    2. Exposure to viral disease    3. Sinusitis, unspecified chronicity, unspecified location        Plan:     Pharyngitis, unspecified etiology  -     dexAMETHasone injection 2 mg  -     brompheniramin-phenylephrin-DM (RYNEX DM) 1-2.5-5 mg/5 mL Soln; Take 2.5 mLs by mouth every 4 (four) hours as needed (cough).  Dispense: 118 mL; Refill: 0  -     doxycycline (VIBRA-TABS) 100 MG tablet; Take 1 tablet (100 mg total) by mouth 2 (two) times daily. for 10 days  Dispense: 20 tablet; Refill: 0    Exposure to viral disease  -     POCT COVID-19 Rapid Screening    Sinusitis, unspecified chronicity, unspecified location

## 2025-06-19 ENCOUNTER — TELEPHONE (OUTPATIENT)
Dept: ORTHOPEDICS | Facility: CLINIC | Age: 80
End: 2025-06-19
Payer: MEDICARE

## 2025-06-19 NOTE — TELEPHONE ENCOUNTER
[facility] does not take patient's insurance. New referral placed to [facility].

## 2025-07-08 ENCOUNTER — TELEPHONE (OUTPATIENT)
Dept: ORTHOPEDICS | Facility: CLINIC | Age: 80
End: 2025-07-08
Payer: MEDICARE

## 2025-07-08 NOTE — TELEPHONE ENCOUNTER
Copied from CRM #[id number]. Topic: Appointments - Amb Referral  >> Jul 8, 2025  3:48 PM Margoth wrote:  Isabel with [facility] called in stating that they received a referral from  for pt Fatmata Tolbert. However, they do not treat the diagnosis that the referral was sent over for. States that they do not have a foot specialist. Please give them a call if needed at [phone]. Thank you.

## 2025-07-08 NOTE — TELEPHONE ENCOUNTER
Tried calling patient to let patient know the referral to [facility] let us know they do not see patient for what patient was wanting to be seen for due to not having a foot/ankle specialist there. No answer but left voicemail message letting patient know we can send her to [facility] to see if patient can be seen there instead since they have a foot and ankle specialist. If patient returns call, please get patient through to talk with me.

## 2025-07-09 ENCOUNTER — TELEPHONE (OUTPATIENT)
Dept: ORTHOPEDICS | Facility: CLINIC | Age: 80
End: 2025-07-09
Payer: MEDICARE

## 2025-07-09 DIAGNOSIS — M20.42 HAMMERTOES OF BOTH FEET: Primary | ICD-10-CM

## 2025-07-09 DIAGNOSIS — M20.41 HAMMERTOES OF BOTH FEET: Primary | ICD-10-CM

## 2025-07-09 NOTE — TELEPHONE ENCOUNTER
Copied from CRM #[id number]. Topic: General Inquiry - Return Call  >> Jul 9, 2025  8:54 AM Aury wrote:  Pt returning call. She said she is willing to go to [facility] and to please put ref in.  Who Called: Fatmata STACEY Tolbert    Patient is returning phone call    Who Left Message for Patient:Guera  Does the patient know what this is regarding?:referral        Patient's Preferred Phone Number on File: [phone]   Best Call Back Number, if different:  Additional Information:

## 2025-08-06 ENCOUNTER — HOSPITAL ENCOUNTER (OUTPATIENT)
Dept: RADIOLOGY | Facility: HOSPITAL | Age: 80
Discharge: HOME | End: 2025-08-06
Attending: RADIOLOGY
Payer: MEDICARE

## 2025-08-06 DIAGNOSIS — Z12.31 SCREENING MAMMOGRAM FOR BREAST CANCER: ICD-10-CM

## 2025-08-06 PROCEDURE — 77067 SCR MAMMO BI INCL CAD: CPT | Mod: 26,,, | Performed by: RADIOLOGY

## 2025-08-06 PROCEDURE — 77067 SCR MAMMO BI INCL CAD: CPT | Mod: TC

## 2025-08-06 PROCEDURE — 77063 BREAST TOMOSYNTHESIS BI: CPT | Mod: 26,,, | Performed by: RADIOLOGY

## 2025-08-20 ENCOUNTER — OFFICE VISIT (OUTPATIENT)
Dept: UROLOGY | Facility: CLINIC | Age: 80
End: 2025-08-20
Payer: MEDICARE

## 2025-08-20 VITALS
BODY MASS INDEX: 26.03 KG/M2 | SYSTOLIC BLOOD PRESSURE: 130 MMHG | WEIGHT: 162 LBS | OXYGEN SATURATION: 97 % | HEIGHT: 66 IN | HEART RATE: 97 BPM | DIASTOLIC BLOOD PRESSURE: 68 MMHG

## 2025-08-20 DIAGNOSIS — N22 CALCULUS OF URINARY TRACT IN DISEASES CLASSIFIED ELSEWHERE: ICD-10-CM

## 2025-08-20 DIAGNOSIS — E11.29 TYPE 2 DIABETES MELLITUS WITH OTHER DIABETIC KIDNEY COMPLICATION, WITH LONG-TERM CURRENT USE OF INSULIN: ICD-10-CM

## 2025-08-20 DIAGNOSIS — E11.3599 TYPE 2 DIABETES MELLITUS WITH PROLIFERATIVE RETINOPATHY WITHOUT MACULAR EDEMA, WITHOUT LONG-TERM CURRENT USE OF INSULIN, UNSPECIFIED LATERALITY: ICD-10-CM

## 2025-08-20 DIAGNOSIS — N20.0 RIGHT RENAL STONE: Primary | ICD-10-CM

## 2025-08-20 DIAGNOSIS — Z79.4 TYPE 2 DIABETES MELLITUS WITH OTHER DIABETIC KIDNEY COMPLICATION, WITH LONG-TERM CURRENT USE OF INSULIN: ICD-10-CM

## 2025-08-20 PROCEDURE — 1101F PT FALLS ASSESS-DOCD LE1/YR: CPT | Mod: CPTII,,, | Performed by: UROLOGY

## 2025-08-20 PROCEDURE — 3288F FALL RISK ASSESSMENT DOCD: CPT | Mod: CPTII,,, | Performed by: UROLOGY

## 2025-08-20 PROCEDURE — 1160F RVW MEDS BY RX/DR IN RCRD: CPT | Mod: CPTII,,, | Performed by: UROLOGY

## 2025-08-20 PROCEDURE — 99204 OFFICE O/P NEW MOD 45 MIN: CPT | Mod: S$PBB,,, | Performed by: UROLOGY

## 2025-08-20 PROCEDURE — 99215 OFFICE O/P EST HI 40 MIN: CPT | Mod: PBBFAC | Performed by: UROLOGY

## 2025-08-20 PROCEDURE — 3078F DIAST BP <80 MM HG: CPT | Mod: CPTII,,, | Performed by: UROLOGY

## 2025-08-20 PROCEDURE — 1159F MED LIST DOCD IN RCRD: CPT | Mod: CPTII,,, | Performed by: UROLOGY

## 2025-08-20 PROCEDURE — 99999 PR PBB SHADOW E&M-EST. PATIENT-LVL V: CPT | Mod: PBBFAC,,, | Performed by: UROLOGY

## 2025-08-20 PROCEDURE — 3075F SYST BP GE 130 - 139MM HG: CPT | Mod: CPTII,,, | Performed by: UROLOGY

## 2025-08-29 ENCOUNTER — HOSPITAL ENCOUNTER (OUTPATIENT)
Dept: RADIOLOGY | Facility: HOSPITAL | Age: 80
Discharge: HOME | End: 2025-08-29
Attending: UROLOGY
Payer: MEDICARE

## 2025-09-04 ENCOUNTER — OFFICE VISIT (OUTPATIENT)
Dept: FAMILY MEDICINE | Facility: CLINIC | Age: 80
End: 2025-09-04
Payer: MEDICARE

## 2025-09-04 VITALS
HEIGHT: 66 IN | OXYGEN SATURATION: 95 % | SYSTOLIC BLOOD PRESSURE: 121 MMHG | TEMPERATURE: 98 F | WEIGHT: 160 LBS | DIASTOLIC BLOOD PRESSURE: 73 MMHG | HEART RATE: 84 BPM | BODY MASS INDEX: 25.71 KG/M2 | RESPIRATION RATE: 18 BRPM

## 2025-09-04 DIAGNOSIS — J00 COMMON COLD: ICD-10-CM

## 2025-09-04 DIAGNOSIS — Z20.822 CONTACT WITH AND (SUSPECTED) EXPOSURE TO COVID-19: ICD-10-CM

## 2025-09-04 DIAGNOSIS — J02.9 SORE THROAT: Primary | ICD-10-CM

## 2025-09-04 LAB
CTP QC/QA: YES
CTP QC/QA: YES
MOLECULAR STREP A: NEGATIVE
SARS-COV-2 RDRP RESP QL NAA+PROBE: NEGATIVE

## 2025-09-04 PROCEDURE — 87651 STREP A DNA AMP PROBE: CPT | Mod: RHCUB | Performed by: FAMILY MEDICINE

## 2025-09-04 PROCEDURE — 87635 SARS-COV-2 COVID-19 AMP PRB: CPT | Mod: RHCUB | Performed by: FAMILY MEDICINE

## 2025-09-05 ENCOUNTER — OFFICE VISIT (OUTPATIENT)
Dept: FAMILY MEDICINE | Facility: CLINIC | Age: 80
End: 2025-09-05
Payer: MEDICARE

## 2025-09-05 VITALS
HEART RATE: 83 BPM | TEMPERATURE: 98 F | BODY MASS INDEX: 25.71 KG/M2 | HEIGHT: 66 IN | WEIGHT: 160 LBS | SYSTOLIC BLOOD PRESSURE: 129 MMHG | RESPIRATION RATE: 20 BRPM | OXYGEN SATURATION: 97 % | DIASTOLIC BLOOD PRESSURE: 77 MMHG

## 2025-09-05 DIAGNOSIS — J34.9 DISEASE OF NASAL CAVITY AND SINUSES: Primary | ICD-10-CM

## 2025-09-05 RX ORDER — DOXYCYCLINE 100 MG/1
100 CAPSULE ORAL EVERY 12 HOURS
Qty: 14 CAPSULE | Refills: 0 | Status: SHIPPED | OUTPATIENT
Start: 2025-09-05 | End: 2025-09-12

## 2025-09-05 RX ORDER — CHLORPHENIRAMINE MALEATE, DEXTROMETHORPHAN HYDROBROMIDE, AND PHENYLEPHRINE HYDROCHLORIDE 4; 10; 10 MG/1; MG/1; MG/1
1 TABLET, COATED ORAL 4 TIMES DAILY PRN
Qty: 20 TABLET | Refills: 0 | Status: SHIPPED | OUTPATIENT
Start: 2025-09-05 | End: 2025-09-10

## 2025-09-05 RX ORDER — DEXAMETHASONE SODIUM PHOSPHATE 4 MG/ML
4 INJECTION, SOLUTION INTRA-ARTICULAR; INTRALESIONAL; INTRAMUSCULAR; INTRAVENOUS; SOFT TISSUE
Status: COMPLETED | OUTPATIENT
Start: 2025-09-05 | End: 2025-09-05

## 2025-09-05 RX ADMIN — DEXAMETHASONE SODIUM PHOSPHATE 4 MG: 4 INJECTION, SOLUTION INTRA-ARTICULAR; INTRALESIONAL; INTRAMUSCULAR; INTRAVENOUS; SOFT TISSUE at 07:09
